# Patient Record
Sex: FEMALE | Race: WHITE | NOT HISPANIC OR LATINO | Employment: PART TIME | URBAN - NONMETROPOLITAN AREA
[De-identification: names, ages, dates, MRNs, and addresses within clinical notes are randomized per-mention and may not be internally consistent; named-entity substitution may affect disease eponyms.]

---

## 2017-02-27 ENCOUNTER — OFFICE VISIT - GICH (OUTPATIENT)
Dept: FAMILY MEDICINE | Facility: OTHER | Age: 52
End: 2017-02-27

## 2017-02-27 ENCOUNTER — HISTORY (OUTPATIENT)
Dept: FAMILY MEDICINE | Facility: OTHER | Age: 52
End: 2017-02-27

## 2017-02-27 DIAGNOSIS — R39.9 UNSPECIFIED SYMPTOMS AND SIGNS INVOLVING THE GENITOURINARY SYSTEM: ICD-10-CM

## 2017-02-27 DIAGNOSIS — R10.2 PELVIC AND PERINEAL PAIN: ICD-10-CM

## 2017-02-27 DIAGNOSIS — R39.15 URGENCY OF URINATION: ICD-10-CM

## 2017-02-27 LAB
BACTERIA URINE: NORMAL BACTERIA/HPF
BILIRUB UR QL: NEGATIVE
CLARITY, URINE: CLEAR CLARITY
COLOR UR: YELLOW COLOR
EPITHELIAL CELLS: NORMAL EPI/HPF
GLUCOSE URINE: NEGATIVE MG/DL
KETONES UR QL: NEGATIVE MG/DL
LEUKOCYTE ESTERASE URINE: ABNORMAL
NITRITE UR QL STRIP: NEGATIVE
OCCULT BLOOD,URINE - HISTORICAL: ABNORMAL
PH UR: 5.5 [PH]
PROTEIN QUALITATIVE,URINE - HISTORICAL: NEGATIVE MG/DL
RBC - HISTORICAL: NORMAL /HPF
SP GR UR STRIP: <=1.005
UROBILINOGEN,QUALITATIVE - HISTORICAL: NORMAL EU/DL
WBC - HISTORICAL: NORMAL /HPF

## 2017-03-01 LAB
CULTURE - HISTORICAL: ABNORMAL
CULTURE - HISTORICAL: ABNORMAL
SUSCEPTIBILITY RESULT - HISTORICAL: ABNORMAL

## 2017-05-24 ENCOUNTER — OFFICE VISIT - GICH (OUTPATIENT)
Dept: FAMILY MEDICINE | Facility: OTHER | Age: 52
End: 2017-05-24

## 2017-05-24 ENCOUNTER — HOSPITAL ENCOUNTER (OUTPATIENT)
Dept: RADIOLOGY | Facility: OTHER | Age: 52
End: 2017-05-24
Attending: NURSE PRACTITIONER

## 2017-05-24 ENCOUNTER — HISTORY (OUTPATIENT)
Dept: FAMILY MEDICINE | Facility: OTHER | Age: 52
End: 2017-05-24

## 2017-05-24 DIAGNOSIS — J20.9 ACUTE BRONCHITIS: ICD-10-CM

## 2017-06-19 ENCOUNTER — HISTORY (OUTPATIENT)
Dept: FAMILY MEDICINE | Facility: OTHER | Age: 52
End: 2017-06-19

## 2017-06-19 ENCOUNTER — OFFICE VISIT - GICH (OUTPATIENT)
Dept: FAMILY MEDICINE | Facility: OTHER | Age: 52
End: 2017-06-19

## 2017-06-19 DIAGNOSIS — I10 ESSENTIAL (PRIMARY) HYPERTENSION: ICD-10-CM

## 2017-06-19 DIAGNOSIS — E78.00 PURE HYPERCHOLESTEROLEMIA: ICD-10-CM

## 2017-06-19 DIAGNOSIS — Z92.89 PERSONAL HISTORY OF OTHER MEDICAL TREATMENT (CODE): ICD-10-CM

## 2017-06-19 DIAGNOSIS — R53.83 OTHER FATIGUE: ICD-10-CM

## 2017-06-19 LAB
A/G RATIO - HISTORICAL: 1.3 (ref 1–2)
ABSOLUTE BASOPHILS - HISTORICAL: 0.1 THOU/CU MM
ABSOLUTE EOSINOPHILS - HISTORICAL: 0.1 THOU/CU MM
ABSOLUTE IMMATURE GRANULOCYTES(METAS,MYELOS,PROS) - HISTORICAL: 0 THOU/CU MM
ABSOLUTE LYMPHOCYTES - HISTORICAL: 1.7 THOU/CU MM (ref 0.9–2.9)
ABSOLUTE MONOCYTES - HISTORICAL: 0.4 THOU/CU MM
ABSOLUTE NEUTROPHILS - HISTORICAL: 5 THOU/CU MM (ref 1.7–7)
ALBUMIN SERPL-MCNC: 3.9 G/DL (ref 3.5–5.7)
ALP SERPL-CCNC: 85 IU/L (ref 34–104)
ALT (SGPT) - HISTORICAL: 10 IU/L (ref 7–52)
ANION GAP - HISTORICAL: 8 (ref 5–18)
AST SERPL-CCNC: 10 IU/L (ref 13–39)
BASOPHILS # BLD AUTO: 1.1 %
BILIRUB SERPL-MCNC: 0.4 MG/DL (ref 0.3–1)
BUN SERPL-MCNC: 11 MG/DL (ref 7–25)
BUN/CREAT RATIO - HISTORICAL: 16
CALCIUM SERPL-MCNC: 9.3 MG/DL (ref 8.6–10.3)
CHLORIDE SERPLBLD-SCNC: 103 MMOL/L (ref 98–107)
CHOL/HDL RATIO - HISTORICAL: 11.39
CHOLESTEROL TOTAL: 353 MG/DL
CO2 SERPL-SCNC: 26 MMOL/L (ref 21–31)
CREAT SERPL-MCNC: 0.69 MG/DL (ref 0.7–1.3)
EOSINOPHIL NFR BLD AUTO: 1.9 %
ERYTHROCYTE [DISTWIDTH] IN BLOOD BY AUTOMATED COUNT: 12.8 % (ref 11.5–15.5)
GFR IF NOT AFRICAN AMERICAN - HISTORICAL: >60 ML/MIN/1.73M2
GLOBULIN - HISTORICAL: 3.1 G/DL (ref 2–3.7)
GLUCOSE SERPL-MCNC: 86 MG/DL (ref 70–105)
HCT VFR BLD AUTO: 43.5 % (ref 33–51)
HDLC SERPL-MCNC: 31 MG/DL (ref 23–92)
HEMOGLOBIN: 14.7 G/DL (ref 12–16)
HEPATITIS C ANTIBODY CATEGORY - HISTORICAL: NORMAL
IMMATURE GRANULOCYTES(METAS,MYELOS,PROS) - HISTORICAL: 0.3 %
LDL COMMENT - HISTORICAL: ABNORMAL
LYMPHOCYTES NFR BLD AUTO: 23.2 % (ref 20–44)
MCH RBC QN AUTO: 30 PG (ref 26–34)
MCHC RBC AUTO-ENTMCNC: 33.8 G/DL (ref 32–36)
MCV RBC AUTO: 89 FL (ref 80–100)
MONOCYTES NFR BLD AUTO: 5.8 %
NEUTROPHILS NFR BLD AUTO: 67.7 % (ref 42–72)
NON-HDL CHOLESTEROL - HISTORICAL: 322 MG/DL
PATIENT STATUS - HISTORICAL: ABNORMAL
PLATELET # BLD AUTO: 239 THOU/CU MM (ref 140–440)
PMV BLD: 11.5 FL (ref 6.5–11)
POTASSIUM SERPL-SCNC: 3.4 MMOL/L (ref 3.5–5.1)
PROT SERPL-MCNC: 7 G/DL (ref 6.4–8.9)
RED BLOOD COUNT - HISTORICAL: 4.9 MIL/CU MM (ref 4–5.2)
SODIUM SERPL-SCNC: 137 MMOL/L (ref 133–143)
TRIGL SERPL-MCNC: 1206 MG/DL
TSH - HISTORICAL: 1.63 UIU/ML (ref 0.34–5.6)
WHITE BLOOD COUNT - HISTORICAL: 7.4 THOU/CU MM (ref 4.5–11)

## 2017-06-19 ASSESSMENT — ANXIETY QUESTIONNAIRES
7. FEELING AFRAID AS IF SOMETHING AWFUL MIGHT HAPPEN: MORE THAN HALF THE DAYS
6. BECOMING EASILY ANNOYED OR IRRITABLE: MORE THAN HALF THE DAYS
5. BEING SO RESTLESS THAT IT IS HARD TO SIT STILL: NOT AT ALL
2. NOT BEING ABLE TO STOP OR CONTROL WORRYING: NOT AT ALL
4. TROUBLE RELAXING: NOT AT ALL
3. WORRYING TOO MUCH ABOUT DIFFERENT THINGS: NEARLY EVERY DAY
1. FEELING NERVOUS, ANXIOUS, OR ON EDGE: NOT AT ALL
GAD7 TOTAL SCORE: 7

## 2017-06-19 ASSESSMENT — PATIENT HEALTH QUESTIONNAIRE - PHQ9: SUM OF ALL RESPONSES TO PHQ QUESTIONS 1-9: 8

## 2017-09-24 ENCOUNTER — COMMUNICATION - GICH (OUTPATIENT)
Dept: FAMILY MEDICINE | Facility: OTHER | Age: 52
End: 2017-09-24

## 2017-09-24 DIAGNOSIS — I10 ESSENTIAL (PRIMARY) HYPERTENSION: ICD-10-CM

## 2017-09-24 DIAGNOSIS — E78.00 PURE HYPERCHOLESTEROLEMIA: ICD-10-CM

## 2017-09-24 DIAGNOSIS — F34.1 DYSTHYMIC DISORDER: ICD-10-CM

## 2017-12-17 ENCOUNTER — HEALTH MAINTENANCE LETTER (OUTPATIENT)
Age: 52
End: 2017-12-17

## 2017-12-27 NOTE — PROGRESS NOTES
Patient Information     Patient Name MRN Joana Alvarez [id number] Female 1965      Progress Notes by Jaycee Ochoa MD at 2017  8:45 AM     Author:  Jaycee Ochoa MD Service:  (none) Author Type:  Physician     Filed:  2017 11:41 AM Encounter Date:  2017 Status:  Signed     :  Jaycee Ochoa MD (Physician)            SUBJECTIVE:    Joana Caicedo is a 52 y.o. female who presents to have lab work completed due to several chronic medical issues including hypertension and hyperlipidemia.      Has been very tired for the past 3 months.  No chest pain or shortness of breath.  If she is doing yard or house work, will get extremely tired and will have to lay down to go to sleep.  Works 8 12 hour shifts in a row, then will have 6 days off.  Doesn't take any naps the days she is working.  Would fall asleep easily if she is sitting watching tv or reading.  Has a very hard time falling asleep at night.  She does snore at night.  Uncertain if any witnessed apnea.  Dad has a history of sleep apnea.  Thinks that a CPAP would make her feel too claustrophobic.  She doesn't think she would want to be tested for obstructive sleep apnea or be treated for it either.    Hasn't taken her blood pressure medications yet today.  She thinks her blood pressure has been high quite often.  Wonders if any of the medications she is on would be causing her to be tired, specifically the atenolol.    She had a history of transfusion related to post-partum hemorrhage after delivery of her daughter many years ago.  She states that she had seen adds on TV regarding being tested for hepatitis C due to her age group.  She is interested in having this done today.    HPI  I personally reviewed medications/allergies/history listed below:       Allergies      Allergen   Reactions     Bupropion  Other - Describe In Comment Field     Facial and neck swelling      Niacin  Flushing      "Sumatriptan  Other - Describe In Comment Field     \"burning veins\"    ,   Family History       Problem   Relation Age of Onset     Heart Disease  Father      Other  Father       stage III COPD       Heart Disease  Mother      Other  Mother       kidney disease       Diabetes  Sister      Other  Sister       fibromyalgia, celiac disease.       Other  Other      High cholesterol runs in family     ,   Current Outpatient Prescriptions on File Prior to Visit       Medication  Sig Dispense Refill     acyclovir (ZOVIRAX) 400 mg tablet TAKE ONE TABLET BY MOUTH THREE TIMES A DAY FOR 5 DAYS AT ONSET OF SYMPTOMS 30 tablet 11     ADULT ASPIRIN EC LOW STRENGTH ORAL Take 1 tablet by mouth once daily.       albuterol HFA 90 mcg/actuation inhaler Inhale 2 Puffs by mouth every 4 hours if needed. 1 Inhaler 0     codeine-guaiFENesin (ROBITUSSIN AC)  mg/5 mL liquid Take 5 mL by mouth at bedtime if needed for Cough. Max dose 60 mL per 24 hrs. 150 mL 0     hydrochlorothiazide (HCTZ) 25 mg tablet Take 1 tablet by mouth once daily. 90 tablet 3     lisinopril (PRINIVIL; ZESTRIL) 20 mg tablet Take 1 tablet by mouth once daily. 90 tablet 3     LORazepam (ATIVAN) 0.5 mg tab Take 1 tablet by mouth every 6 hours if needed for Anxiety. 60 tablet 0     meclizine (ANTIVERT) 25 mg tablet Take 1 tablet by mouth 2 times daily if needed for Vertigo.  0     NITROSTAT 0.4 mg sublingual tablet PLACE 1 TABLET UNDER THE TONGUE EVERY 5 MINUTES IF NEEDED FOR CHESTPAIN. 30 tablet 0     pravastatin (PRAVACHOL) 20 mg tablet Take 1 tablet by mouth at bedtime. 90 tablet 3     sertraline (ZOLOFT) 100 mg tablet Take 1 tablet by mouth once daily. 90 tablet 3     No current facility-administered medications on file prior to visit.    ,   Past Medical History:     Diagnosis  Date     High triglycerides      History of MRI of brain and brain stem     Demyelination , Per patient, has had mini strokes    ,   Patient Active Problem List     Diagnosis  Code     " ANXIETY DEPRESSION F34.1     HYPERLIPIDEMIA E78.5     NICOTINE ADDICTION F17.200     INSOMNIA G47.00     HYPERTENSION I10    and   Past Surgical History:      Procedure  Laterality Date     APPENDECTOMY       AK REVISION CERVIX W PREG VAG APPRCH       TUBAL LIGATION       Social History     Social History        Marital status:       Spouse name: N/A     Number of children:  N/A     Years of education:  N/A     Occupational History      Not on file.     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  She has four children ages 22 to about 15 who are all living at home.  She is  from her  and wants to be  but financially cannot afford that.  Her  is living with the family in an apartment.  He has a girlfriend and Joana has a boyfriend as well.              REVIEW OF SYSTEMS:  Review of Systems   Constitutional: Positive for malaise/fatigue. Negative for diaphoresis.   Cardiovascular: Negative for chest pain, orthopnea and leg swelling.   All other systems reviewed and are negative.      OBJECTIVE:  /92  Temp 96.2  F (35.7  C) (Tympanic)  Wt 67.9 kg (149 lb 12.8 oz)  LMP 09/29/2015  Breastfeeding? No  BMI 26.33 kg/m2    EXAM:   Physical Exam   Constitutional: She is well-developed, well-nourished, and in no distress.   HENT:   Head: Normocephalic.   Eyes: Pupils are equal, round, and reactive to light.   Neck: Normal range of motion. Neck supple. No thyromegaly present.   Cardiovascular: Normal rate, regular rhythm and  normal heart sounds.    No murmur heard.  Pulmonary/Chest: Effort normal and breath sounds normal. No respiratory distress. She has no wheezes. She has no rales.   Musculoskeletal: She exhibits no edema.   Lymphadenopathy:     She has no cervical adenopathy.   Skin: Skin is warm and dry.   Psychiatric: Affect normal.   PHQ Score and Severity  Date of PHQ exam: 06/19/17  PHQ-9 TOTAL SCORE: 8  Depression Severity Level: mild     MAUDE Score and Severity  MAUDE-7 SCORE: 7  ANXIETY SEVERITY LEVEL: mild anxiety        ASSESSMENT/PLAN:    ICD-10-CM    1. HYPERTENSION I10 COMP METABOLIC PANEL      amLODIPine (NORVASC) 5 mg tablet      COMP METABOLIC PANEL   2. Pure hypercholesterolemia E78.00 LIPID PANEL      COMP METABOLIC PANEL      LIPID PANEL      COMP METABOLIC PANEL   3. History of transfusion Z92.89 ANTI HCV      ANTI HCV   4. Fatigue, unspecified type R53.83 CBC WITH DIFFERENTIAL      TSH      CBC WITH DIFFERENTIAL      TSH      CBC WITH AUTO DIFFERENTIAL        Plan:    1.  Chronic, stable.  Blood pressure is a little elevated today.  Comprehensive metabolic profile as above.  She is going to try going off of her atenolol and will try instead amlodipine 5 mg daily.  Discussed possible side effects.  Follow up in approximately 1 month to recheck blood pressure after switching medications.  2.  Chronic, stable.  Labs as above.  Continue on pravastatin.  3.  Lab for hepatitis C was drawn today.  4.  Chronic.  Trial of stopping atenolol as above.  Labs completed as above.  Could have underlying obstructive sleep apnea, but would not want to be tested for it or treated for it as noted above.  Jaycee Ochoa MD ....................  6/19/2017   11:39 AM

## 2017-12-28 NOTE — TELEPHONE ENCOUNTER
Patient Information     Patient Name MRN Sex Joana Beard [id number] Female 1965      Telephone Encounter by Yvonne Esparza RN at 2017  8:51 AM     Author:  Yvonne Esparza RN Service:  (none) Author Type:  NURS- Registered Nurse     Filed:  2017  9:07 AM Encounter Date:  2017 Status:  Signed     :  Yvonne Esparza RN (NURS- Registered Nurse)            Statins  Office visit in the past 12 months.  Last visit with JOSSY STONE was on: 2017 in [facility]  Next visit with JOSSY STONE is on: No future appointment listed with this provider  Next visit with Family Practice is on: No future appointment listed in this department  Last Lipids:  Chol: 353    2017  T    2017  HDL:   31    2017  LDL:  No results found in past 5 years    .  LDL DIRECT:  No results found in past 5 years    .  Concommitant use of fibrates and statins-If it is an addition to the medication list, review note and/or discuss with provider.  If already on medication list, refill.  Max refills 12 months from last office visit.    Due for exam.  Limited refill per protocol and letter mailed.  Yvonne Esparza RN ........   2017    8:57 AM      Ace Inhibitors  Office visit in the past 12 months or per provider note.  Last visit with JOSSY STONE was on: 2017 in [facility]  Next visit with JOSSY STONE is on: No future appointment listed with this provider  Next visit with Family Practice is on: No future appointment listed in this department  Lab test requirements:  Creatinine and Potassium annually, if ordering lab, order BMP.  CREATININE (mg/dL)    Date Value   2017 0.69 (L)     POTASSIUM (mmol/L)    Date Value   2017 3.4 (L)   Max refill for 12 months from last office visit or per provider note  Due for exam.  Limited refill per protocol and letter mailed.  Yvonne Esparza RN ........   2017    8:57  AM        Depression-in adults 18 and over  SSRI  Office visit in the past 12 months or as indicated in chart.  Should have clinic visit 1-2 months after initial prescription.  Last visit with JOSSY STONE was on: 06/19/2017 in [facility]  Next visit with LEORAONY JOSSY EPIFANIO is on: No future appointment listed with this provider  Next visit with [facility] is on: No future appointment listed in this department  Max refills 12 months from last office visit or per providers notes.  Due for exam.  Limited refill per protocol and letter mailed.  Yvonne Esparza RN ........   9/26/2017    8:57 AM        Diuretics (may be prescribed for edema)  Office visit in the past 12 months or per provider note.  Last visit with JOSSY STONE was on: 06/19/2017 in [facility]  Next visit with JOSSY STONE R is on: No future appointment listed with this provider  Next visit with [facility] is on: No future appointment listed in this department  Lab testing requirements:  Creatinine and Potassium annually, if ordering lab, order BMP.  CREATININE (mg/dL)    Date Value   06/19/2017 0.69 (L)     POTASSIUM (mmol/L)    Date Value   06/19/2017 3.4 (L)     BP Readings from Last 4 Encounters:    06/19/17 142/92   05/24/17 140/80   02/27/17 120/80   12/07/16 140/90   Review last provider visit note.  If BP reviewed and plan is noted, can refill.  Max refill for 12 months from last office visit or per provider note.  Due for follow up exam B/P and labs-refer to 6/20/17 letter and 6/19/17 OV. .  Limited refill per protocol and letter mailed.  Yvonne Esparza RN ........   9/26/2017    8:57 AM      Refill request for Atenolol inappropriate. This medication stopped at 06/19/17 and Norvasc started. Pharmacy alerted.

## 2017-12-29 NOTE — PATIENT INSTRUCTIONS
Patient Information     Patient Name MRN Sex Joana Beard [id number] Female 1965      Patient Instructions by Jaycee Ochoa MD at 2017  8:45 AM     Author:  Jaycee Ochoa MD Service:  (none) Author Type:  Physician     Filed:  2017  9:15 AM Encounter Date:  2017 Status:  Signed     :  Jaycee Ochoa MD (Physician)            Try stopping atenolol to see if this helps decrease your fatigue.  We will substitute amlodipine 5 mg daily instead for your blood pressure.  If you notice swelling in your feet or ankles, let me know and we can try a different medication.

## 2018-01-02 ENCOUNTER — COMMUNICATION - GICH (OUTPATIENT)
Dept: FAMILY MEDICINE | Facility: OTHER | Age: 53
End: 2018-01-02

## 2018-01-03 NOTE — PROGRESS NOTES
Patient Information     Patient Name MRN Sex     Joana Caicedo [id number] Female 1965      Progress Notes by Rica Cano NP at 2017 12:15 PM     Author:  Rica Cano NP Service:  (none) Author Type:  PHYS- Nurse Practitioner     Filed:  2017  4:22 PM Encounter Date:  2017 Status:  Signed     :  Rica Cano NP (PHYS- Nurse Practitioner)              HPI:   Joana Caicedo is a 51 y.o. female who presents for bladder concerns.   Symptoms x 5 days and continue to worsen.  Symptoms include suprapubic pressure and fullness, urethral burning, frequency, and urgency.   Chills.  Hot flashes.  No fevers.   Nausea.  No vomiting.  Appetite decreased.  Very thirsty.  No change in bowel patterns (chronic alternating diarrhea and constipation).  No back pain.  No vaginal bleeding, discharge or itching.  Tried some cranberry juice.  No OTC medications.  States she did take some left over Bactrim once daily x 5 days without any relief.        Past Medical History      Diagnosis   Date     High triglycerides       History of MRI of brain and brain stem       Demyelination , Per patient, has had mini strokes        Past Surgical History      Procedure  Laterality Date     Pr revision cervix w preg vag apprch       Appendectomy       Tubal ligation         Social History        Substance Use Topics          Smoking status:   Current Every Day Smoker      Packs/day:  1.00      Years:  31.00      Types:  Cigarettes      Smokeless tobacco:   Never Used      Alcohol use   Yes      Comment: 2 per month         Current Outpatient Prescriptions       Medication  Sig Dispense Refill     acyclovir (ZOVIRAX) 400 mg tablet TAKE ONE TABLET BY MOUTH THREE TIMES A DAY FOR 5 DAYS AT ONSET OF SYMPTOMS 30 tablet 11     ADULT ASPIRIN EC LOW STRENGTH ORAL Take 1 tablet by mouth once daily.       atenolol (TENORMIN) 50 mg tablet Take 1 tablet by mouth once daily. 90 tablet 3      "hydrochlorothiazide (HCTZ) 25 mg tablet Take 1 tablet by mouth once daily. 90 tablet 3     lisinopril (PRINIVIL; ZESTRIL) 20 mg tablet Take 1 tablet by mouth once daily. 90 tablet 3     LORazepam (ATIVAN) 0.5 mg tab Take 1 tablet by mouth every 6 hours if needed for Anxiety. 60 tablet 0     meclizine (ANTIVERT) 25 mg tablet Take 1 tablet by mouth 2 times daily if needed for Vertigo.  0     NITROSTAT 0.4 mg sublingual tablet PLACE 1 TABLET UNDER THE TONGUE EVERY 5 MINUTES IF NEEDED FOR CHESTPAIN. 30 tablet 0     pravastatin (PRAVACHOL) 20 mg tablet Take 1 tablet by mouth at bedtime. 90 tablet 3     sertraline (ZOLOFT) 100 mg tablet Take 1 tablet by mouth once daily. 90 tablet 3     No current facility-administered medications for this visit.      Medications have been reviewed by me and are current to the best of my knowledge and ability.      Allergies      Allergen   Reactions     Bupropion  Other - Describe In Comment Field     Facial and neck swelling      Niacin  Flushing     Sumatriptan  Other - Describe In Comment Field     \"burning veins\"        REVIEW OF SYSTEMS:  Refer to HPI.      EXAM:   Vitals:    /80  Pulse 88  Temp 97.9  F (36.6  C) (Tympanic)  Ht 1.607 m (5' 3.25\")  Wt 66.7 kg (147 lb)  LMP 09/29/2015  BMI 25.83 kg/m2    General Appearance: Pleasant, alert, appropriate appearance for age. No acute distress  Chest/Respiratory Exam: Normal chest wall and respirations. Clear to auscultation.  Cardiovascular Exam: Regular rate and rhythm. S1, S2, no murmur  Gastrointestinal Exam: Soft, no masses or organomegaly. Abdomen non-tender. Normal BS x 4.  Diffuse suprapubic tenderness. No CVA tenderness to palpation. No rebound tenderness or guarding.  Psychiatric Exam: Alert and oriented - appropriate affect.    Labs:   Results for orders placed or performed in visit on 02/27/17      URINALYSIS W REFLEX MICROSCOPIC IF POSITIVE      Result  Value Ref Range    COLOR                     Yellow Yellow Color    " CLARITY                   Clear Clear Clarity    SPECIFIC GRAVITY,URINE    <=1.005 (A) 1.010, 1.015, 1.020, 1.025                    PH,URINE                  5.5 6.0, 7.0, 8.0, 5.5, 6.5, 7.5, 8.5                    UROBILINOGEN,QUALITATIVE  Normal Normal EU/dl    PROTEIN, URINE Negative Negative mg/dL    GLUCOSE, URINE Negative Negative mg/dL    KETONES,URINE             Negative Negative mg/dL    BILIRUBIN,URINE           Negative Negative                    OCCULT BLOOD,URINE        Moderate (A) Negative                    NITRITE                   Negative Negative                    LEUKOCYTE ESTERASE        Small (A) Negative                   URINALYSIS MICROSCOPIC      Result  Value Ref Range    RBC 0-2 0-2, None Seen /HPF    WBC 3-5 0-2, 3-5, None Seen /HPF    BACTERIA                  Few None Seen, Rare, Occasional, Few Bacteria/HPF    EPITHELIAL CELLS          Few None Seen, Few Epi/HPF       ASSESSMENT AND PLAN:      ICD-10-CM    1. Urgency of urination R39.15 URINALYSIS W REFLEX MICROSCOPIC IF POSITIVE      URINALYSIS W REFLEX MICROSCOPIC IF POSITIVE      URINALYSIS MICROSCOPIC      URINALYSIS MICROSCOPIC   2. UTI symptoms R39.9 URINE CULTURE      nitrofurantoin macrocrystals/monohydrate (MACROBID) 100 mg capsule      URINE CULTURE   3. Suprapubic pain R10.2 URINE CULTURE      phenazopyridine (PYRIDIUM) 200 mg tablet      URINE CULTURE       Patient refuses wet prep testing  Urinalysis - not indicative of infection.  Symptoms are severe per patient.  Patient self treated with 5 doses of Bactrim at home without improvement.  Urine culture pending  Macrobid 100 mg BID x 5 days for UTI symptoms while awaiting urine culture   Encouraged fluids and frequent bladder emptying.  May use Pyridium OTC PRN.   Call or return to clinic PRN if these symptoms worsen or fail to improve as anticipated. Will call if culture warrants change of abx.               Patient Instructions   Antibiotic has been sent to  pharmacy. Please take full course of antibiotic even if symptoms have completely resolved. This helps prevent against antibiotic resistance.     We will culture the urine to see what bacteria grows out of the urine.  We will call the patient if a change of antibiotic is necessary per the culture.      Patient was instructed in increase fluids including water and cranberry juice.      Monitor for fevers, chills, back pain.  Return to clinic after antibiotic completion if symptoms are not resolved.  Call clinic if symptoms change/worsen.          ARNAV MARQUIS NP..................2/27/2017 12:22 PM

## 2018-01-03 NOTE — PATIENT INSTRUCTIONS
Patient Information     Patient Name MRN Sex Joana Beard [id number] Female 1965      Patient Instructions by Rica Cano NP at 2017 12:15 PM     Author:  Rica Cano NP Service:  (none) Author Type:  PHYS- Nurse Practitioner     Filed:  2017 12:40 PM Encounter Date:  2017 Status:  Signed     :  Rica Cano NP (PHYS- Nurse Practitioner)            Antibiotic has been sent to pharmacy. Please take full course of antibiotic even if symptoms have completely resolved. This helps prevent against antibiotic resistance.     We will culture the urine to see what bacteria grows out of the urine.  We will call the patient if a change of antibiotic is necessary per the culture.      Patient was instructed in increase fluids including water and cranberry juice.      Monitor for fevers, chills, back pain.  Return to clinic after antibiotic completion if symptoms are not resolved.  Call clinic if symptoms change/worsen.

## 2018-01-05 NOTE — PATIENT INSTRUCTIONS
Patient Information     Patient Name MRN Sex Joana Beard [id number] Female 1965      Patient Instructions by Rica Cano NP at 2017  1:45 PM     Author:  Rica Cano NP Service:  (none) Author Type:  PHYS- Nurse Practitioner     Filed:  2017  2:36 PM Encounter Date:  2017 Status:  Signed     :  Rica Cano NP (PHYS- Nurse Practitioner)            Azithromycin daily x 5 days    Prednisone 2 tabs daily x 5 days       Albuterol inhaler every 4 hours as needed    Robitussin with codeine at bedtime as needed          Most coughs are caused by a viral infection.   Usually coughs can last 2 to 3 weeks. Sometimes the cough becomes loose (wet) for a few days, and your child coughs up a lot of phlegm (mucus). This is usually a sign that the end of the illness is near.    Most sore throats are caused by viruses and are part of a cold. About 10% of sore throats are caused by strep bacteria.    Encouraged fluids and rest.    May use symptomatic care with tylenol or ibuprofen.     Using a humidifier works well to break up the congestion.     Elevate the mattress to 15 degrees in order to help with the congestion.    Frequent swallows of cool liquid.      Oatmeal or honey coats the throat and some patients find it soothes the pain.     Salt water gargles as needed    Return to clinic with change/worsening of symptoms or concerns.

## 2018-01-05 NOTE — NURSING NOTE
Patient Information     Patient Name MRN Joana Alvarez [id number] Female 1965      Nursing Note by Holly Bonner at 2017  1:45 PM     Author:  Holly Bonner Service:  (none) Author Type:  NURS- Student Practical Nurse     Filed:  2017  2:01 PM Encounter Date:  2017 Status:  Signed     :  Holly Bonner (NURS- Student Practical Nurse)            Patient presents with cough productive yellow/green phlegm, burning in chest when coughing since mothers day. Holly Bonner LPN .............2017  1:50 PM

## 2018-01-09 ENCOUNTER — COMMUNICATION - GICH (OUTPATIENT)
Dept: FAMILY MEDICINE | Facility: OTHER | Age: 53
End: 2018-01-09

## 2018-01-09 DIAGNOSIS — F34.1 DYSTHYMIC DISORDER: ICD-10-CM

## 2018-01-09 DIAGNOSIS — I10 ESSENTIAL (PRIMARY) HYPERTENSION: ICD-10-CM

## 2018-01-09 DIAGNOSIS — E78.00 PURE HYPERCHOLESTEROLEMIA: ICD-10-CM

## 2018-01-27 VITALS
DIASTOLIC BLOOD PRESSURE: 80 MMHG | WEIGHT: 151 LBS | SYSTOLIC BLOOD PRESSURE: 140 MMHG | BODY MASS INDEX: 26.54 KG/M2 | TEMPERATURE: 97.4 F | HEART RATE: 69 BPM

## 2018-01-27 VITALS
HEIGHT: 63 IN | SYSTOLIC BLOOD PRESSURE: 120 MMHG | TEMPERATURE: 97.9 F | WEIGHT: 147 LBS | HEART RATE: 88 BPM | DIASTOLIC BLOOD PRESSURE: 80 MMHG | BODY MASS INDEX: 26.05 KG/M2

## 2018-01-27 VITALS
WEIGHT: 149.8 LBS | TEMPERATURE: 96.2 F | SYSTOLIC BLOOD PRESSURE: 142 MMHG | DIASTOLIC BLOOD PRESSURE: 92 MMHG | BODY MASS INDEX: 26.12 KG/M2

## 2018-01-31 ASSESSMENT — ANXIETY QUESTIONNAIRES: GAD7 TOTAL SCORE: 7

## 2018-01-31 ASSESSMENT — PATIENT HEALTH QUESTIONNAIRE - PHQ9: SUM OF ALL RESPONSES TO PHQ QUESTIONS 1-9: 8

## 2018-02-09 ENCOUNTER — DOCUMENTATION ONLY (OUTPATIENT)
Dept: FAMILY MEDICINE | Facility: OTHER | Age: 53
End: 2018-02-09

## 2018-02-09 PROBLEM — F17.200 NICOTINE ADDICTION: Status: ACTIVE | Noted: 2018-02-09

## 2018-02-09 PROBLEM — I10 HYPERTENSION: Status: ACTIVE | Noted: 2018-02-09

## 2018-02-09 PROBLEM — G47.00 INSOMNIA: Status: ACTIVE | Noted: 2018-02-09

## 2018-02-09 PROBLEM — E78.5 HYPERLIPIDEMIA: Status: ACTIVE | Noted: 2018-02-09

## 2018-02-09 RX ORDER — LORAZEPAM 0.5 MG/1
0.5 TABLET ORAL EVERY 6 HOURS PRN
COMMUNITY
Start: 2016-09-16 | End: 2018-11-02

## 2018-02-09 RX ORDER — CODEINE PHOSPHATE/GUAIFENESIN 10-100MG/5
5 LIQUID (ML) ORAL
COMMUNITY
Start: 2017-05-24 | End: 2018-08-29

## 2018-02-09 RX ORDER — HYDROCHLOROTHIAZIDE 25 MG/1
25 TABLET ORAL DAILY
COMMUNITY
Start: 2018-01-11 | End: 2018-05-07

## 2018-02-09 RX ORDER — ASPIRIN 81 MG/1
1 TABLET ORAL DAILY
COMMUNITY
End: 2022-12-22

## 2018-02-09 RX ORDER — NITROGLYCERIN 0.4 MG/1
1 TABLET SUBLINGUAL EVERY 5 MIN PRN
COMMUNITY
Start: 2015-02-16 | End: 2019-02-11

## 2018-02-09 RX ORDER — ACYCLOVIR 400 MG/1
1 TABLET ORAL 3 TIMES DAILY
COMMUNITY
Start: 2016-06-28 | End: 2018-06-28

## 2018-02-09 RX ORDER — SERTRALINE HYDROCHLORIDE 100 MG/1
100 TABLET, FILM COATED ORAL DAILY
COMMUNITY
Start: 2018-01-11 | End: 2018-05-07

## 2018-02-09 RX ORDER — MECLIZINE HYDROCHLORIDE 25 MG/1
25 TABLET ORAL 2 TIMES DAILY PRN
COMMUNITY
Start: 2012-10-25 | End: 2021-06-03

## 2018-02-09 RX ORDER — AMLODIPINE BESYLATE 5 MG/1
5 TABLET ORAL DAILY
COMMUNITY
Start: 2017-06-19 | End: 2018-06-15

## 2018-02-09 RX ORDER — ALBUTEROL SULFATE 90 UG/1
2 AEROSOL, METERED RESPIRATORY (INHALATION) EVERY 4 HOURS PRN
COMMUNITY
Start: 2017-05-24 | End: 2018-08-29

## 2018-02-09 RX ORDER — PRAVASTATIN SODIUM 20 MG
20 TABLET ORAL AT BEDTIME
COMMUNITY
Start: 2018-01-11 | End: 2018-05-07

## 2018-02-09 RX ORDER — LISINOPRIL 20 MG/1
20 TABLET ORAL DAILY
COMMUNITY
Start: 2018-01-11 | End: 2018-05-07

## 2018-02-12 NOTE — TELEPHONE ENCOUNTER
Patient Information     Patient Name MRN Joana Alvarez [id number] Female 1965      Telephone Encounter by Yvonne Esparza RN at 2018  2:31 PM     Author:  Yvonne Esparza RN Service:  (none) Author Type:  NURS- Registered Nurse     Filed:  2018  2:34 PM Encounter Date:  2018 Status:  Signed     :  Yvonne Esparza RN (NURS- Registered Nurse)            Refill request for Atenolol 50 mg inappropriate. This medication was discontinued on 17. Contacted pharmacy and informed, 'Oh yes we have that it was discontinued. I am not sure why we faxed for a refill'. Unable to complete prescription refill per RN Medication Refill Policy.................... Yvonne Esparza RN ....................  2018   2:33 PM

## 2018-02-12 NOTE — TELEPHONE ENCOUNTER
Patient Information     Patient Name MRN Sex Joana Beard [id number] Female 1965      Telephone Encounter by Jaycee Ochoa MD at 2018 10:34 AM     Author:  Jaycee Ochoa MD Service:  (none) Author Type:  Physician     Filed:  2018 10:34 AM Encounter Date:  2018 Status:  Signed     :  Jaycee Ochoa MD (Physician)            I refilled #90 of each medication.  Please have patient make follow up appointment.  Jaycee Ochoa MD ....................  2018   10:34 AM

## 2018-02-12 NOTE — TELEPHONE ENCOUNTER
Patient Information     Patient Name MRN Sex Joana Beard [id number] Female 1965      Telephone Encounter by Yvonne Esparza RN at 1/10/2018 10:43 AM     Author:  Yvonne Esparza RN Service:  (none) Author Type:  NURS- Registered Nurse     Filed:  1/10/2018 10:55 AM Encounter Date:  2018 Status:  Signed     :  Yvonne Esparza RN (NURS- Registered Nurse)            Request physician consideration to refill lisinopril, pravastatin, hctz and sertraline as requested. Patient remains due for follow up  B/P and labs after notification on 17-refer to 17 letter and 17 OV. Patient unavailable via phone today. Unable to leave message. No identifiers.     Ace Inhibitors  Office visit in the past 12 months or per provider note.  Last visit with JOSSY STONE was on: 2017 in [place] [facility]-Follow up in approximately 1 month to recheck blood pressure after switching medications  Next visit with JOSSY STONE is on: No future appointment listed with this provider  Lab test requirements:  Creatinine and Potassium annually, if ordering lab, order BMP.  CREATININE (mg/dL)    Date Value   2017 0.69 (L)     POTASSIUM (mmol/L)    Date Value   2017 3.4 (L)   Max refill for 12 months from last office visit or per provider note  Unable to complete prescription refill per RN Medication Refill Policy.................... Yvonne Esparza RN ....................  1/10/2018   10:48 AM        Diuretics (may be prescribed for edema)  Office visit in the past 12 months or per provider note.  Last visit with JOSSY STONE was on: 2017 in [place] [facility]-Follow up in approximately 1 month to recheck blood pressure after switching medications  Next visit with JOSSY STONE is on: No future appointment listed with this provider  Next visit with Family Practice is on: No future appointment listed in this department  Lab testing requirements:   Creatinine and Potassium annually, if ordering lab, order BMP.  CREATININE (mg/dL)    Date Value   2017 0.69 (L)     POTASSIUM (mmol/L)    Date Value   2017 3.4 (L)     BP Readings from Last 4 Encounters:    17 142/92   17 140/80   17 120/80   16 140/90   Unable to complete prescription refill per RN Medication Refill Policy.................... Yvonne Esparza RN ....................  1/10/2018   10:50 AM      Statins  Office visit in the past 12 months.  Last visit with JOSSY STONE was on: 2017 in [facility]  Next visit with JOSSY STONE is on: No future appointment listed with this provider  Next visit with [facility] is on: No future appointment listed in this department  Last Lipids:  Chol: 353    2017  T    2017  HDL:   31    2017  LDL:  No results found in past 5 years    .  LDL DIRECT:  No results found in past 5 years    .  Concommitant use of fibrates and statins-If it is an addition to the medication list, review note and/or discuss with provider.  If already on medication list, refill.  Max refills 12 months from last office visit.    Unable to complete prescription refill per RN Medication Refill Policy.................... Yvonne Esparza RN ....................  1/10/2018   10:51 AM        Depression-in adults 18 and over  SSRI  Office visit in the past 12 months or as indicated in chart.  Should have clinic visit 1-2 months after initial prescription.  Last visit with JOSSY STONE was on: 2017 in [facility]  Next visit with JOSSY STONE is on: No future appointment listed with this provider  Next visit with [facility] is on: No future appointment listed in this department  Max refills 12 months from last office visit or per providers notes.  Unable to complete prescription refill per RN Medication Refill Policy.................... Yvonne Esparza RN ....................   1/10/2018   10:51 AM

## 2018-02-13 NOTE — TELEPHONE ENCOUNTER
Patient Information     Patient Name MRN Sex Joana Beard [id number] Female 1965      Telephone Encounter by Kalie Hodges at 1/15/2018  1:43 PM     Author:  Kalie Hodges Service:  (none) Author Type:  (none)     Filed:  1/15/2018  1:43 PM Encounter Date:  2018 Status:  Signed     :  Kalie Hodges            No answer , unable to reach patient .  Kalie Hodges LPN ....................1/15/2018  1:43 PM

## 2018-02-13 NOTE — TELEPHONE ENCOUNTER
Patient Information     Patient Name MRN Sex Joana Beard [id number] Female 1965      Telephone Encounter by Kalie Hodges at 2018 10:54 AM     Author:  Kalie Hodges Service:  (none) Author Type:  (none)     Filed:  2018 10:54 AM Encounter Date:  2018 Status:  Signed     :  Kalie Hodges            Left message for patient to call back .  Kalie Hodges LPN ....................2018  10:54 AM

## 2018-05-07 DIAGNOSIS — I10 ESSENTIAL HYPERTENSION WITH GOAL BLOOD PRESSURE LESS THAN 140/90: Primary | ICD-10-CM

## 2018-05-07 DIAGNOSIS — E78.00 PURE HYPERCHOLESTEROLEMIA: ICD-10-CM

## 2018-05-07 DIAGNOSIS — F34.1 DYSTHYMIC DISORDER: ICD-10-CM

## 2018-05-07 NOTE — LETTER
May 9, 2018      Joana Caicedo  [address] [place] [id number]          This is to remind you that you are coming due for your annual labs and office visit with Jaycee Ochoa MD.  Your last visit was on 06/19/2017.     Additional refills of your medication require you to complete this visit.    Please call [phone] to schedule your appointment.    Thank you for choosing [facility] for your health care needs.    Sincerely,      Refill RN  [facility]

## 2018-05-09 NOTE — TELEPHONE ENCOUNTER
HCTZ, Pravastatin, Lisinopril, Sertraline  LOV and labs 06/19/2017. Coming due for annual labs and OV. Reminder letter sent.    Routing refill request to provider for review/approval because: Labs out of range: 06/19/17 K+ and Creatinine      Unable to complete prescription refill per RNMedication Refill Policy.................... Yvonne Esparza ....................  5/9/2018   10:34 AM

## 2018-05-10 RX ORDER — LISINOPRIL 20 MG/1
TABLET ORAL
Qty: 90 TABLET | Refills: 0 | Status: SHIPPED | OUTPATIENT
Start: 2018-05-10 | End: 2018-08-24

## 2018-05-10 RX ORDER — HYDROCHLOROTHIAZIDE 25 MG/1
TABLET ORAL
Qty: 90 TABLET | Refills: 0 | Status: SHIPPED | OUTPATIENT
Start: 2018-05-10 | End: 2018-09-26

## 2018-05-10 RX ORDER — SERTRALINE HYDROCHLORIDE 100 MG/1
TABLET, FILM COATED ORAL
Qty: 90 TABLET | Refills: 0 | Status: SHIPPED | OUTPATIENT
Start: 2018-05-10 | End: 2018-09-19

## 2018-05-10 RX ORDER — PRAVASTATIN SODIUM 20 MG
TABLET ORAL
Qty: 90 TABLET | Refills: 0 | Status: SHIPPED | OUTPATIENT
Start: 2018-05-10 | End: 2018-08-24

## 2018-05-31 ENCOUNTER — OFFICE VISIT (OUTPATIENT)
Dept: FAMILY MEDICINE | Facility: OTHER | Age: 53
End: 2018-05-31
Attending: NURSE PRACTITIONER
Payer: COMMERCIAL

## 2018-05-31 VITALS
RESPIRATION RATE: 20 BRPM | SYSTOLIC BLOOD PRESSURE: 132 MMHG | HEART RATE: 92 BPM | TEMPERATURE: 96.1 F | WEIGHT: 150.5 LBS | BODY MASS INDEX: 26.45 KG/M2 | DIASTOLIC BLOOD PRESSURE: 80 MMHG

## 2018-05-31 DIAGNOSIS — B00.89 HERPES DERMATITIS: Primary | ICD-10-CM

## 2018-05-31 PROCEDURE — 99213 OFFICE O/P EST LOW 20 MIN: CPT | Performed by: NURSE PRACTITIONER

## 2018-05-31 RX ORDER — VALACYCLOVIR HYDROCHLORIDE 1 G/1
2000 TABLET, FILM COATED ORAL 2 TIMES DAILY
Qty: 30 TABLET | Refills: 0 | Status: SHIPPED | OUTPATIENT
Start: 2018-05-31 | End: 2018-05-31

## 2018-05-31 RX ORDER — VALACYCLOVIR HYDROCHLORIDE 1 G/1
2000 TABLET, FILM COATED ORAL 2 TIMES DAILY
Qty: 30 TABLET | Refills: 0 | Status: SHIPPED | OUTPATIENT
Start: 2018-05-31 | End: 2018-11-02

## 2018-05-31 ASSESSMENT — PAIN SCALES - GENERAL: PAINLEVEL: WORST PAIN (10)

## 2018-05-31 NOTE — PROGRESS NOTES
Nursing Notes:   Maryuri Ga LPN  5/31/2018 10:57 AM  Unsigned  Patient presents to the clinic for possible shingles. States it started last night with the pain. Has had shingles before.   Maryuri Ga LPN............. May 31, 2018 10:57 AM       SUBJECTIVE:   Joana Caicedo is a 53 year old female who presents to clinic today for the following health issues:    Rash    She reports a burning sensation in her chin.  She has had this in the past and has been diagnosed with shingles.  However she has had recurrent episodes, she thinks that this is episode number 8.  Her mother also has had shingles numerous times as well so she believes she has inherited this from her mom.  In the past she has been on antiviral medications and this has helped. Denies fevers, chills. C/O itchy rash on chin and neck area.       Problem list and histories reviewed & adjusted, as indicated.  Additional history: as documented    Current Outpatient Prescriptions   Medication Sig Dispense Refill     acyclovir (ZOVIRAX) 400 MG tablet Take 1 tablet by mouth 3 times daily Take for 5 days at onset of symptoms.       albuterol (PROAIR HFA/PROVENTIL HFA/VENTOLIN HFA) 108 (90 BASE) MCG/ACT Inhaler Inhale 2 puffs into the lungs every 4 hours as needed       amLODIPine (NORVASC) 5 MG tablet Take 5 mg by mouth daily       aspirin EC 81 MG EC tablet Take 1 tablet by mouth daily       ASPIRIN PO Take 81 mg by mouth daily       ATENOLOL PO Take 50 mg by mouth daily       guaiFENesin-codeine (GUAIFENESIN AC) 100-10 MG/5ML SYRP syrup Take 5 mLs by mouth nightly as needed for cough Max dose 60 mL per 24 hrs       hydrochlorothiazide (HYDRODIURIL) 25 MG tablet TAKE 1 TABLET BY MOUTH ONCE DAILY. 90 tablet 0     HYDROCHLOROTHIAZIDE PO Take 25 mg by mouth daily       lisinopril (PRINIVIL/ZESTRIL) 20 MG tablet TAKE 1 TABLET BY MOUTH ONCE DAILY. 90 tablet 0     LISINOPRIL PO Take 25 mg by mouth       LORazepam (ATIVAN) 0.5 MG tablet Take  "0.5 mg by mouth every 6 hours as needed for anxiety       meclizine (ANTIVERT) 25 MG tablet Take 25 mg by mouth 2 times daily as needed       nitroGLYcerin (NITROSTAT) 0.4 MG sublingual tablet Place 1 tablet under the tongue every 5 minutes as needed for chest pain       pravastatin (PRAVACHOL) 20 MG tablet TAKE 1 TABLET BY MOUTH AT BEDTIME. 90 tablet 0     sertraline (ZOLOFT) 100 MG tablet TAKE 1 TABLET BY MOUTH ONCE DAILY. 90 tablet 0     SERTRALINE HCL PO Take 50 mg by mouth       Allergies   Allergen Reactions     Amoxicillin      Bupropion      Other reaction(s): Other - Describe In Comment Field  Facial and neck swelling     Niacin      Other reaction(s): Flushing     No Clinical Screening - See Comments Other (See Comments)     Migraine med that starts with a \"V\"  cvholesterol med that starts with a \"N\"     Sumatriptan      Other reaction(s): Other - Describe In Comment Field  \"burning veins\"       Reviewed and updated as needed this visit by clinical staff  Tobacco  Allergies  Meds       Reviewed and updated as needed this visit by Provider         ROS:  A comprehensive 10 point ROS was obtained and documented for notable findings in the HPI.       OBJECTIVE:     /80 (BP Location: Right arm, Patient Position: Sitting, Cuff Size: Adult Regular)  Pulse 92  Temp 96.1  F (35.6  C) (Tympanic)  Resp 20  Wt 150 lb 8 oz (68.3 kg)  Breastfeeding? No  BMI 26.45 kg/m2  Body mass index is 26.45 kg/(m^2).  GENERAL: healthy, alert and no distress  EYES: Eyes grossly normal to inspection  RESP: With ease  SKIN: Examination of the rash reveals: Herpes: One Inflamed patch of clear fluid-filled vesicles located on the LT side of the chin. Pain on the skin all over the chin and neck.       Diagnostic Test Results:  none     ASSESSMENT/PLAN:     1. Herpes dermatitis  - valACYclovir (VALTREX) 1000 mg tablet; Take 2 tablets (2,000 mg) by mouth 2 times daily Then take 1 tablet BID for 3 days. May repeat for future " outbreaks at onset of lesions.  Dispense: 30 tablet; Refill: 0    Medical Decision Making:    Differential Diagnosis:  Rash: Dermatitis  Herpes simplex  Herpes zoster    Serious Comorbid Conditions:  Adult:  None    PLAN:    Rash:  antiviral  moisturizer  Reassurance was given to the patient  Tylenol or Ibuprofen for pain, fever  Rx  Discussed that in the past the rash has crossed the body center line. And since there have been multiple outbreaks, this is not likely shingles. But more of a cold sore virus on the skin. Either way it is treated the same way. Valtrex written.     Followup:    If not improving or if condition worsens, follow up with your Primary Care Provider    Disclaimer:  This note consists of words and symbols derived from keyboarding, dictation, or using voice recognition software. As a result, there may be errors in the script that have gone undetected. Please consider this when interpreting information found in this note.      Michelle Redding NP, 5/31/2018 10:59 AM

## 2018-05-31 NOTE — PATIENT INSTRUCTIONS
The Herpes Virus  Herpes is a virus that can cause sores on the skin. There are 2 types of the virus. Depending on how you come in contact with the virus, either type can cause outbreaks near the mouth or on the sex organs.  Understanding the herpes virus  Herpes reproduces only when it is inside the body. It does so by tricking a healthy cell into producing copies of the herpes virus. Each copy can infect nearby cells. But, before too long, the body s defenses rally to stop the attack. The immune system forces the virus to retreat. Even then, the virus stays inside the body but does not cause disease. For some people, an acute outbreak never happens again. For others, outbreaks are more likely to occur due to menstruation, illness, poor diet, fatigue, exposure to cold or strong sunlight, or stress.    How the herpes virus attacks  1. The herpes virus enters the body through a small break in the skin. The virus can also enter by direct contact with mucous membranes, such as those of the lips, vagina, or anus.  2. Inside the body, the herpes virus binds to a special site on a skin cell. Then part of the virus moves into the cell.  3. Inside the skin cell, the virus releases a set of instructions. These commands cause the cell to begin making copies of the herpes virus.  4. Herpes blisters appear on the skin. Herpes blisters may also appear on mucous membranes lining the mouth, vagina, or anus.

## 2018-05-31 NOTE — MR AVS SNAPSHOT
After Visit Summary   5/31/2018    Joana Caicedo    MRN: [id number]           Patient Information     Date Of Birth          1965        Visit Information        Provider Department      5/31/2018 11:00 AM Michelle Redding NP [facility]        Today's Diagnoses     Herpes dermatitis    -  1      Care Instructions      The Herpes Virus  Herpes is a virus that can cause sores on the skin. There are 2 types of the virus. Depending on how you come in contact with the virus, either type can cause outbreaks near the mouth or on the sex organs.  Understanding the herpes virus  Herpes reproduces only when it is inside the body. It does so by tricking a healthy cell into producing copies of the herpes virus. Each copy can infect nearby cells. But, before too long, the body s defenses rally to stop the attack. The immune system forces the virus to retreat. Even then, the virus stays inside the body but does not cause disease. For some people, an acute outbreak never happens again. For others, outbreaks are more likely to occur due to menstruation, illness, poor diet, fatigue, exposure to cold or strong sunlight, or stress.    How the herpes virus attacks  1. The herpes virus enters the body through a small break in the skin. The virus can also enter by direct contact with mucous membranes, such as those of the lips, vagina, or anus.  2. Inside the body, the herpes virus binds to a special site on a skin cell. Then part of the virus moves into the cell.  3. Inside the skin cell, the virus releases a set of instructions. These commands cause the cell to begin making copies of the herpes virus.  4. Herpes blisters appear on the skin. Herpes blisters may also appear on mucous membranes lining the mouth, vagina, or anus.                    Follow-ups after your visit        Who to contact     If you have questions or need follow up information about today's clinic visit or your  schedule please contact [facility] directly at [phone].  Normal or non-critical lab and imaging results will be communicated to you by MyChart, letter or phone within 4 business days after the clinic has received the results. If you do not hear from us within 7 days, please contact the clinic through MyChart or phone. If you have a critical or abnormal lab result, we will notify you by phone as soon as possible.  Submit refill requests through [employer] or call your pharmacy and they will forward the refill request to us. Please allow 3 business days for your refill to be completed.          Additional Information About Your Visit        Care EveryWhere ID     This is your Care EveryWhere ID. This could be used by other organizations to access your [place] medical records  [id number]        Your Vitals Were     Pulse Temperature Respirations Breastfeeding? BMI (Body Mass Index)       92 96.1  F (35.6  C) (Tympanic) 20 No 26.45 kg/m2        Blood Pressure from Last 3 Encounters:   05/31/18 132/80   06/19/17 (!) 142/92   05/24/17 140/80    Weight from Last 3 Encounters:   05/31/18 150 lb 8 oz (68.3 kg)   06/19/17 149 lb 12.8 oz (67.9 kg)   05/24/17 151 lb (68.5 kg)              Today, you had the following     No orders found for display         Today's Medication Changes          These changes are accurate as of 5/31/18 11:10 AM.  If you have any questions, ask your nurse or doctor.               Start taking these medicines.        Dose/Directions    valACYclovir 1000 mg tablet   Commonly known as:  VALTREX   Used for:  Herpes dermatitis   Started by:  Michelle Redding NP        Dose:  2000 mg   Take 2 tablets (2,000 mg) by mouth 2 times daily Then take 1 tablet BID for 3 days. May repeat for future outbreaks at onset of lesions.   Quantity:  30 tablet   Refills:  0            Where to get your medicines      These medications were sent to [id number] - [place], [place] -  [address]  [address], [place] [address]     Phone:  [phone]     valACYclovir 1000 mg tablet                Primary Care Provider Office Phone # Fax #    Jaycee Mita Ochoa [phone]       [address]  [place] [address]        Equal Access to Services     [facility] : Hadii aad ku hadasho Soomaali, waaxda luqadaha, qaybta kaalmada adeegyada, waxay idiin hayaan adeeg khpattiesh la'tahminan . So [facility] [phone].    ATENCIÓN: Si carlie messer, tiene a meeks disposición servicios gratuitos de asistencia lingüística. Myron al [phone].    We comply with applicable federal civil rights laws and [place] laws. We do not discriminate on the basis of race, color, national origin, age, disability, sex, sexual orientation, or gender identity.            Thank you!     Thank you for choosing [facility]  for your care. Our goal is always to provide you with excellent care. Hearing back from our patients is one way we can continue to improve our services. Please take a few minutes to complete the written survey that you may receive in the mail after your visit with us. Thank you!             Your Updated Medication List - Protect others around you: Learn how to safely use, store and throw away your medicines at www.disposemymeds.org.          This list is accurate as of 5/31/18 11:10 AM.  Always use your most recent med list.                   Brand Name Dispense Instructions for use Diagnosis    acyclovir 400 MG tablet    ZOVIRAX     Take 1 tablet by mouth 3 times daily Take for 5 days at onset of symptoms.        albuterol 108 (90 Base) MCG/ACT Inhaler    PROAIR HFA/PROVENTIL HFA/VENTOLIN HFA     Inhale 2 puffs into the lungs every 4 hours as needed        amLODIPine 5 MG tablet    NORVASC     Take 5 mg by mouth daily        aspirin 81 MG EC tablet      Take 1 tablet by mouth daily        ASPIRIN PO      Take 81 mg by mouth daily         ATENOLOL PO      Take 50 mg by mouth daily        guaiFENesin-codeine 100-10 MG/5ML Syrp syrup    guaiFENesin AC     Take 5 mLs by mouth nightly as needed for cough Max dose 60 mL per 24 hrs        * HYDROCHLOROTHIAZIDE PO      Take 25 mg by mouth daily        * hydrochlorothiazide 25 MG tablet    HYDRODIURIL    90 tablet    TAKE 1 TABLET BY MOUTH ONCE DAILY.    Essential hypertension with goal blood pressure less than 140/90       * LISINOPRIL PO      Take 25 mg by mouth        * lisinopril 20 MG tablet    PRINIVIL/ZESTRIL    90 tablet    TAKE 1 TABLET BY MOUTH ONCE DAILY.    Essential hypertension with goal blood pressure less than 140/90       LORazepam 0.5 MG tablet    ATIVAN     Take 0.5 mg by mouth every 6 hours as needed for anxiety        meclizine 25 MG tablet    ANTIVERT     Take 25 mg by mouth 2 times daily as needed        NITROSTAT 0.4 MG sublingual tablet   Generic drug:  nitroGLYcerin      Place 1 tablet under the tongue every 5 minutes as needed for chest pain        pravastatin 20 MG tablet    PRAVACHOL    90 tablet    TAKE 1 TABLET BY MOUTH AT BEDTIME.    Pure hypercholesterolemia       * SERTRALINE HCL PO      Take 50 mg by mouth        * sertraline 100 MG tablet    ZOLOFT    90 tablet    TAKE 1 TABLET BY MOUTH ONCE DAILY.    Dysthymic disorder       valACYclovir 1000 mg tablet    VALTREX    30 tablet    Take 2 tablets (2,000 mg) by mouth 2 times daily Then take 1 tablet BID for 3 days. May repeat for future outbreaks at onset of lesions.    Herpes dermatitis       * Notice:  This list has 6 medication(s) that are the same as other medications prescribed for you. Read the directions carefully, and ask your doctor or other care provider to review them with you.

## 2018-05-31 NOTE — NURSING NOTE
Patient presents to the clinic for possible shingles. States it started last night with the pain. Has had shingles before.   Maryuri Ga LPN............. May 31, 2018 10:57 AM

## 2018-06-15 DIAGNOSIS — I10 ESSENTIAL HYPERTENSION: Primary | ICD-10-CM

## 2018-06-18 RX ORDER — AMLODIPINE BESYLATE 5 MG/1
TABLET ORAL
Qty: 90 TABLET | Refills: 0 | Status: SHIPPED | OUTPATIENT
Start: 2018-06-18 | End: 2018-09-23

## 2018-06-18 NOTE — TELEPHONE ENCOUNTER
"Refill request from  for:  amLODIPine (NORVASC) 5 MG tablet    LOV 6/19/2017 with Jaycee Ochoa MD  Noted...\"She is going to try going off of her atenolol and will try instead amlodipine 5 mg daily.  Discussed possible side effects.  Follow up in approximately 1 month to recheck blood pressure after switching medications.\"    Noted previous failed attempts to reach patient and reminder letter was sent 5/9/2018.    No upcoming appt noted    Last refill 6/19/2017 for 90 X 3    Also clarification needed regarding hypertension diagnosis for reorder (pulmonary/benign essential)    Routing to PCP for refill consideration    Requested Prescriptions   Pending Prescriptions Disp Refills     amLODIPine (NORVASC) 5 MG tablet [Pharmacy Med Name: AMLODIPINE 5MG TAB] 90 tablet      Sig: TAKE 1 TABLET BY MOUTH ONCE DAILY.    Calcium Channel Blockers Protocol  Failed    6/15/2018  1:06 AM       Failed - Normal serum creatinine on file in past 12 months    Recent Labs   Lab Test  06/19/17   1023   CR  0.69*          Passed - Blood pressure under 140/90 in past 12 months    BP Readings from Last 3 Encounters:   05/31/18 132/80   06/19/17 (!) 142/92   05/24/17 140/80          Passed - Recent (12 mo) or future (30 days) visit within the authorizing provider's specialty    Patient had office visit in the last 12 months or has a visit in the next 30 days with authorizing provider or within the authorizing provider's specialty.  See \"Patient Info\" tab in inbasket, or \"Choose Columns\" in Meds & Orders section of the refill encounter.           Passed - Patient is age 18 or older       Passed - No active pregnancy on record       Passed - No positive pregnancy test in past 12 months      Medication Detail    Disp Refills Start End    amLODIPine (NORVASC) 5 mg tablet 90 tablet 3 6/19/2017     Sig - Route: Take 1 tablet by mouth once daily. - Oral    Class: eRx    E-Prescribing Status: Receipt confirmed by pharmacy " (6/19/2017  9:12 AM CDT)    Associated Diagnoses   HYPERTENSION  - Primary       Unable to complete prescription refill per RN Medication Refill Policy.................... Myla Anderson ....................  6/18/2018   12:20 PM

## 2018-06-26 DIAGNOSIS — E78.00 PURE HYPERCHOLESTEROLEMIA: ICD-10-CM

## 2018-06-26 DIAGNOSIS — F34.1 DYSTHYMIC DISORDER: ICD-10-CM

## 2018-06-26 DIAGNOSIS — I10 ESSENTIAL HYPERTENSION WITH GOAL BLOOD PRESSURE LESS THAN 140/90: ICD-10-CM

## 2018-06-28 ENCOUNTER — HOSPITAL ENCOUNTER (EMERGENCY)
Facility: OTHER | Age: 53
Discharge: HOME | End: 2018-06-28
Attending: PHYSICIAN ASSISTANT | Admitting: PHYSICIAN ASSISTANT
Payer: COMMERCIAL

## 2018-06-28 VITALS
BODY MASS INDEX: 25.61 KG/M2 | OXYGEN SATURATION: 97 % | HEART RATE: 73 BPM | TEMPERATURE: 97.3 F | SYSTOLIC BLOOD PRESSURE: 156 MMHG | RESPIRATION RATE: 16 BRPM | DIASTOLIC BLOOD PRESSURE: 92 MMHG | HEIGHT: 64 IN | WEIGHT: 150 LBS

## 2018-06-28 DIAGNOSIS — K04.7 DENTAL ABSCESS: ICD-10-CM

## 2018-06-28 DIAGNOSIS — K08.89 PAIN, DENTAL: ICD-10-CM

## 2018-06-28 PROCEDURE — 25000125 ZZHC RX 250: Performed by: PHYSICIAN ASSISTANT

## 2018-06-28 PROCEDURE — 99284 EMERGENCY DEPT VISIT MOD MDM: CPT | Mod: 25 | Performed by: PHYSICIAN ASSISTANT

## 2018-06-28 PROCEDURE — 25000128 H RX IP 250 OP 636: Performed by: PHYSICIAN ASSISTANT

## 2018-06-28 PROCEDURE — 96372 THER/PROPH/DIAG INJ SC/IM: CPT | Performed by: PHYSICIAN ASSISTANT

## 2018-06-28 PROCEDURE — 99283 EMERGENCY DEPT VISIT LOW MDM: CPT | Mod: Z6 | Performed by: PHYSICIAN ASSISTANT

## 2018-06-28 RX ORDER — LISINOPRIL 20 MG/1
TABLET ORAL
Qty: 90 TABLET | OUTPATIENT
Start: 2018-06-28

## 2018-06-28 RX ORDER — IBUPROFEN 800 MG/1
800 TABLET, FILM COATED ORAL EVERY 8 HOURS PRN
Qty: 60 TABLET | Refills: 0 | Status: SHIPPED | OUTPATIENT
Start: 2018-06-28 | End: 2018-07-06

## 2018-06-28 RX ORDER — PRAVASTATIN SODIUM 20 MG
TABLET ORAL
Qty: 90 TABLET | OUTPATIENT
Start: 2018-06-28

## 2018-06-28 RX ORDER — SERTRALINE HYDROCHLORIDE 100 MG/1
TABLET, FILM COATED ORAL
Qty: 90 TABLET | OUTPATIENT
Start: 2018-06-28

## 2018-06-28 RX ORDER — HYDROCHLOROTHIAZIDE 25 MG/1
TABLET ORAL
Qty: 90 TABLET | OUTPATIENT
Start: 2018-06-28

## 2018-06-28 RX ORDER — CEFTRIAXONE SODIUM 1 G
1 VIAL (EA) INJECTION ONCE
Status: COMPLETED | OUTPATIENT
Start: 2018-06-28 | End: 2018-06-28

## 2018-06-28 RX ORDER — HYDROCODONE BITARTRATE AND ACETAMINOPHEN 5; 325 MG/1; MG/1
1 TABLET ORAL EVERY 6 HOURS PRN
Qty: 10 TABLET | Refills: 0 | Status: SHIPPED | OUTPATIENT
Start: 2018-06-28 | End: 2018-08-29

## 2018-06-28 RX ADMIN — CEFTRIAXONE SODIUM 1 G: 1 INJECTION, POWDER, FOR SOLUTION INTRAMUSCULAR; INTRAVENOUS at 16:39

## 2018-06-28 ASSESSMENT — ENCOUNTER SYMPTOMS
NECK STIFFNESS: 0
DIFFICULTY URINATING: 0
EYE REDNESS: 0
ARTHRALGIAS: 0
CHILLS: 1
SHORTNESS OF BREATH: 0
FEVER: 0
COLOR CHANGE: 0
ABDOMINAL PAIN: 0
HEADACHES: 0
CONFUSION: 0

## 2018-06-28 NOTE — ED AVS SNAPSHOT
[facility]    [address] [facility] [address]    Phone:  [phone]    Fax:  [phone]                                       Joana Caicedo   MRN: [id number]    Department:  [facility]   Date of Visit:  6/28/2018           Patient Information     Date Of Birth          1965        Your diagnoses for this visit were:     Pain, dental     Dental abscess        You were seen by Michael Mendosa PA-C.      Follow-up Information     Follow up with Jaycee Ochoa MD.    Specialty:  Family Practice    Contact information:    [address] [facility] [id number] [phone]        Your next 10 appointments already scheduled     Jul 31, 2018  9:45 AM CDT   Office Visit with Jaycee Ochoa MD   [facility] ([facility])    [address] [facility] [address] [phone]           Bring a current list of meds and any records pertaining to this visit. For Physicals, please bring immunization records and any forms needing to be filled out. Please arrive 10 minutes early to complete paperwork.              24 Hour Appointment Hotline     To schedule an appointment at [facility], please call [phone]. If you don't have a family doctor or clinic, we will help you find one. [facility] are conveniently located to serve the needs of you and your family.           Review of your medicines      START taking        Dose / Directions Last dose taken    HYDROcodone-acetaminophen 5-325 MG per tablet   Commonly known as:  NORCO   Dose:  1 tablet   Quantity:  10 tablet        Take 1 tablet by mouth every 6 hours as needed for severe pain   Refills:  0        ibuprofen 800 MG tablet   Commonly known as:  ADVIL/MOTRIN   Dose:  800 mg   Quantity:  60 tablet        Take 1 tablet (800 mg) by mouth every 8 hours as needed for moderate pain   Refills:  0          Our records  show that you are taking the medicines listed below. If these are incorrect, please call your family doctor or clinic.        Dose / Directions Last dose taken    albuterol 108 (90 Base) MCG/ACT Inhaler   Commonly known as:  PROAIR HFA/PROVENTIL HFA/VENTOLIN HFA   Dose:  2 puff        Inhale 2 puffs into the lungs every 4 hours as needed   Refills:  0        amLODIPine 5 MG tablet   Commonly known as:  NORVASC   Quantity:  90 tablet        TAKE 1 TABLET BY MOUTH ONCE DAILY.   Refills:  0        aspirin 81 MG EC tablet   Dose:  1 tablet        Take 1 tablet by mouth daily   Refills:  0        ATENOLOL PO   Dose:  50 mg        Take 50 mg by mouth daily   Refills:  0        guaiFENesin-codeine 100-10 MG/5ML Syrp syrup   Commonly known as:  guaiFENesin AC   Dose:  5 mL        Take 5 mLs by mouth nightly as needed for cough Max dose 60 mL per 24 hrs   Refills:  0        hydrochlorothiazide 25 MG tablet   Commonly known as:  HYDRODIURIL   Quantity:  90 tablet        TAKE 1 TABLET BY MOUTH ONCE DAILY.   Refills:  0        lisinopril 20 MG tablet   Commonly known as:  PRINIVIL/ZESTRIL   Quantity:  90 tablet        TAKE 1 TABLET BY MOUTH ONCE DAILY.   Refills:  0        LORazepam 0.5 MG tablet   Commonly known as:  ATIVAN   Dose:  0.5 mg        Take 0.5 mg by mouth every 6 hours as needed for anxiety   Refills:  0        meclizine 25 MG tablet   Commonly known as:  ANTIVERT   Dose:  25 mg        Take 25 mg by mouth 2 times daily as needed   Refills:  0        NITROSTAT 0.4 MG sublingual tablet   Dose:  1 tablet   Generic drug:  nitroGLYcerin        Place 1 tablet under the tongue every 5 minutes as needed for chest pain   Refills:  0        pravastatin 20 MG tablet   Commonly known as:  PRAVACHOL   Quantity:  90 tablet        TAKE 1 TABLET BY MOUTH AT BEDTIME.   Refills:  0        sertraline 100 MG tablet   Commonly known as:  ZOLOFT   Quantity:  90 tablet        TAKE 1 TABLET BY MOUTH ONCE DAILY.   Refills:  0         valACYclovir 1000 mg tablet   Commonly known as:  VALTREX   Dose:  2000 mg   Quantity:  30 tablet        Take 2 tablets (2,000 mg) by mouth 2 times daily For one day, Then take 1 tablet BID for 3 days. May repeat for future outbreaks at onset of lesions.   Refills:  0                Information about OPIOIDS     PRESCRIPTION OPIOIDS: WHAT YOU NEED TO KNOW   We gave you an opioid (narcotic) pain medicine. It is important to manage your pain, but opioids are not always the best choice. You should first try all the other options your care team gave you. Take this medicine for as short a time (and as few doses) as possible.     These medicines have risks:    DO NOT drive when on new or higher doses of pain medicine. These medicines can affect your alertness and reaction times, and you could be arrested for driving under the influence (DUI). If you need to use opioids long-term, talk to your care team about driving.    DO NOT operate heave machinery    DO NOT do any other dangerous activities while taking these medicines.     DO NOT drink any alcohol while taking these medicines.      If the opioid prescribed includes acetaminophen, DO NOT take with any other medicines that contain acetaminophen. Read all labels carefully. Look for the word  acetaminophen  or  Tylenol.  Ask your pharmacist if you have questions or are unsure.    You can get addicted to pain medicines, especially if you have a history of addiction (chemical, alcohol or substance dependence). Talk to your care team about ways to reduce this risk.    Store your pills in a secure place, locked if possible. We will not replace any lost or stolen medicine. If you don t finish your medicine, please throw away (dispose) as directed by your pharmacist. The [place] Pollution Control Agency has more information about safe disposal: https://www.pca.state.[place]/living-green/managing-unwanted-medications.     All opioids tend to cause constipation. Drink plenty of  water and eat foods that have a lot of fiber, such as fruits, vegetables, prune juice, apple juice and high-fiber cereal. Take a laxative (Miralax, milk of magnesia, Colace, Senna) if you don t move your bowels at least every other day.         Prescriptions were sent or printed at these locations (2 Prescriptions)                   Other Prescriptions                Printed at Department/Unit printer (2 of 2)         HYDROcodone-acetaminophen (NORCO) 5-325 MG per tablet               ibuprofen (ADVIL/MOTRIN) 800 MG tablet                Orders Needing Specimen Collection     None      Pending Results     No orders found from 6/26/2018 to 6/29/2018.            Pending Culture Results     No orders found from 6/26/2018 to 6/29/2018.            Pending Results Instructions     If you had any lab results that were not finalized at the time of your Discharge, you can call the ED Lab Result RN at [phone]. You will be contacted by this team for any positive Lab results or changes in treatment. The nurses are available 7 days a week from 10A to 6:30P.  You can leave a message 24 hours per day and they will return your call.        Thank you for choosing [facility]       Thank you for choosing [facility] for your care. Our goal is always to provide you with excellent care. Hearing back from our patients is one way we can continue to improve our services. Please take a few minutes to complete the written survey that you may receive in the mail after you visit with us. Thank you!        Care EveryWhere ID     This is your Care EveryWhere ID. This could be used by other organizations to access your [facility] medical records  [id number]        Equal Access to Services     KINZA RODRIGUEZ : Hadii roberto Bello, wajamesda daniel, qaybta kaalmada bartolo, carrol pizano. [facility] [phone].    ATENCIÓN: Si habla español, tiene a meeks disposición servicios gratuitos de asistencia lingüística.  Myron nye [phone].    We comply with applicable federal civil rights laws and [place] laws. We do not discriminate on the basis of race, color, national origin, age, disability, sex, sexual orientation, or gender identity.            After Visit Summary       This is your record. Keep this with you and show to your community pharmacist(s) and doctor(s) at your next visit.

## 2018-06-28 NOTE — ED AVS SNAPSHOT
[facility]    [address]    [place] [address]    Phone:  [phone]    Fax:  [phone]                                       Joana Caicedo   MRN: [id number]    Department:  [facility]   Date of Visit:  6/28/2018           After Visit Summary Signature Page     I have received my discharge instructions, and my questions have been answered. I have discussed any challenges I see with this plan with the nurse or doctor.    ..........................................................................................................................................  Patient/Patient Representative Signature      ..........................................................................................................................................  Patient Representative Print Name and Relationship to Patient    ..................................................               ................................................  Date                                            Time    ..........................................................................................................................................  Reviewed by Signature/Title    ...................................................              ..............................................  Date                                                            Time

## 2018-06-28 NOTE — TELEPHONE ENCOUNTER
"Filled 5/10/18 #90. Due 8/10/18. Pharmacy alerted.   LOV-06/19/2017 Patient due for annual labs and OV. Call placed to patient who states, \" I only have 7 days left of my pills. Pharmacy only gives me 30 days at a time\". Patient agreed to be transferred to scheduling to set up OV. Contacted [facility] who verified that patient has 60 days remaining on 5/10/18 refills. [facility] to contact patient and let her know. Unable to complete prescription refill per RNMedication Refill Policy.................... Yvonne Esparza ....................  6/28/2018   10:41 AM      sertraline (ZOLOFT) 100 MG tablet 90 tablet 0 5/10/2018  No   Sig: TAKE 1 TABLET BY MOUTH ONCE DAILY.   Class: E-Prescribe   Notes to Pharmacy: Patient enrolled in our Rx Med Sync service to improveadherence. We are requesting a refill authorization inadvance to ensure an active prescription is on file.   Order: [id number]   E-Prescribing Status: Receipt confirmed by pharmacy (5/10/2018  1:43 PM CDT)     pravastatin (PRAVACHOL) 20 MG tablet 90 tablet 0 5/10/2018  No   Sig: TAKE 1 TABLET BY MOUTH AT BEDTIME.   Class: E-Prescribe   Notes to Pharmacy: Patient enrolled in our Rx Med Sync service to improveadherence. We are requesting a refill authorization inadvance to ensure an active prescription is on file.   Order: [id number]   E-Prescribing Status: Receipt confirmed by pharmacy (5/10/2018  1:43 PM CDT     hydrochlorothiazide (HYDRODIURIL) 25 MG tablet 90 tablet 0 5/10/2018  No   Sig: TAKE 1 TABLET BY MOUTH ONCE DAILY.   Class: E-Prescribe   Notes to Pharmacy: Patient enrolled in our Rx Med Sync service to improveadherence. We are requesting a refill authorization inadvance to ensure an active prescription is on file.   Order: [id number]   E-Prescribing Status: Receipt confirmed by pharmacy (5/10/2018  1:43 PM CDT)     lisinopril (PRINIVIL/ZESTRIL) 20 MG tablet 90 tablet 0 5/10/2018  No   Sig: TAKE 1 TABLET BY MOUTH ONCE DAILY.   Class: E-Prescribe   Notes to Pharmacy: " Patient enrolled in our Rx Med Sync service to improveadherence. We are requesting a refill authorization inadvance to ensure an active prescription is on file.  patient IS DUE FOR physical!   Order: [id number]   E-Prescribing Status: Receipt confirmed by pharmacy (5/10/2018  1:43 PM CDT)     [id number] - [place], [place] - [address]

## 2018-06-28 NOTE — ED TRIAGE NOTES
COLUMBIA-SUICIDE SEVERITY RATING SCALE   Screen with Triage Points for Emergency Department      Ask questions that are bolded and underlined.   Past  month   Ask Questions 1 and 2 YES NO   1)  Have you wished you were dead or wished you could go to sleep and not wake up?   x   2)  Have you actually had any thoughts of killing yourself?   x   If YES to 2, ask questions 3, 4, 5, and 6.  If NO to 2, go directly to question 6.   3)  Have you been thinking about how you might do this?   E.g.  I thought about taking an overdose but I never made a specific plan as to when where or how I would actually do it .and I would never go through with it.       4)  Have you had these thoughts and had some intention of acting on them?   As opposed to  I have the thoughts but I definitely will not do anything about them.       5)  Have you started to work out or worked out the details of how to kill yourself? Do you intend to carry out this plan?      6)  Have you ever done anything, started to do anything, or prepared to do anything to end your life?  Examples: Collected pills, obtained a gun, gave away valuables, wrote a will or suicide note, took out pills but didn t swallow any, held a gun but changed your mind or it was grabbed from your hand, went to the roof but didn t jump; or actually took pills, tried to shoot yourself, cut yourself, tried to hang yourself, etc.    If YES, ask: Was this within the past three months?  Lifetime     x    Past 3 Months     x   Item 1:  Behavioral Health Referral at Discharge  Item 2:  Behavioral Health Referral at Discharge   Item 3:  Behavioral Health Consult (Psychiatric Nurse/) and consider Patient Safety Precautions  Item 4:  Immediate Notification of Physician and/or Behavioral Health and Patient Safety Precautions   Item 5:  Immediate Notification of Physician and/or Behavioral Health and Patient Safety Precautions  Item 6:  Over 3 months ago: Behavioral Health Consult  (Psychiatric Nurse/) and consider Patient Safety Precautions  OR  Item 6:  3 months ago or less: Immediate Notification of Physician and/or Behavioral Health and Patient Safety Precautions

## 2018-06-28 NOTE — ED PROVIDER NOTES
History     Chief Complaint   Patient presents with     Dental Pain     HPI Comments: This is a 52 yo female who started getting front lower tooth pain a few days ago.  She called her dentist and he will be seeing her tomorrow.  She has had some chills and warmth to the lower facial area.  She is here for further evaluation at this time.     The history is provided by the patient.         Problem List:    Patient Active Problem List    Diagnosis Date Noted     Hyperlipidemia 02/09/2018     Priority: Medium     Hypertension 02/09/2018     Priority: Medium     Insomnia 02/09/2018     Priority: Medium     Nicotine addiction 02/09/2018     Priority: Medium     Dysthymic disorder 09/02/2009     Priority: Medium        Past Medical History:    Past Medical History:   Diagnosis Date     Personal history of other medical treatment (CODE)      Pure hyperglyceridemia        Past Surgical History:    Past Surgical History:   Procedure Laterality Date     APPENDECTOMY OPEN      No Comments Provided     LAPAROSCOPIC TUBAL LIGATION      No Comments Provided     OTHER SURGICAL HISTORY      32255.0,NH REVISION CERVIX W PREG VAG APPRCH       Family History:    Family History   Problem Relation Age of Onset     HEART DISEASE Father      Heart Disease     Other - See Comments Father       stage III COPD     HEART DISEASE Mother      Heart Disease     Other - See Comments Mother       kidney disease     Diabetes Sister      Diabetes     Other - See Comments Sister       fibromyalgia, celiac disease.     Other - See Comments Other      High cholesterol runs in family       Social History:  Marital Status:  Legally  [3]  Social History   Substance Use Topics     Smoking status: Current Every Day Smoker     Packs/day: 1.00     Years: 31.00     Types: Cigarettes     Smokeless tobacco: Never Used     Alcohol use Yes      Comment: Alcoholic Drinks/day: 2 per month        Medications:      albuterol (PROAIR HFA/PROVENTIL  "HFA/VENTOLIN HFA) 108 (90 BASE) MCG/ACT Inhaler   amLODIPine (NORVASC) 5 MG tablet   aspirin EC 81 MG EC tablet   ATENOLOL PO   guaiFENesin-codeine (GUAIFENESIN AC) 100-10 MG/5ML SYRP syrup   hydrochlorothiazide (HYDRODIURIL) 25 MG tablet   HYDROcodone-acetaminophen (NORCO) 5-325 MG per tablet   ibuprofen (ADVIL/MOTRIN) 800 MG tablet   lisinopril (PRINIVIL/ZESTRIL) 20 MG tablet   LORazepam (ATIVAN) 0.5 MG tablet   meclizine (ANTIVERT) 25 MG tablet   pravastatin (PRAVACHOL) 20 MG tablet   sertraline (ZOLOFT) 100 MG tablet   valACYclovir (VALTREX) 1000 mg tablet   nitroGLYcerin (NITROSTAT) 0.4 MG sublingual tablet   [DISCONTINUED] HYDROCHLOROTHIAZIDE PO   [DISCONTINUED] LISINOPRIL PO   [DISCONTINUED] SERTRALINE HCL PO         Review of Systems   Constitutional: Positive for chills. Negative for fever.   HENT: Positive for dental problem. Negative for congestion.    Eyes: Negative for redness.   Respiratory: Negative for shortness of breath.    Cardiovascular: Negative for chest pain.   Gastrointestinal: Negative for abdominal pain.   Genitourinary: Negative for difficulty urinating.   Musculoskeletal: Negative for arthralgias and neck stiffness.   Skin: Negative for color change.   Neurological: Negative for headaches.   Psychiatric/Behavioral: Negative for confusion.       Physical Exam   BP: (!) 156/92  Pulse: 73  Temp: 97.3  F (36.3  C)  Resp: 16  Height: 162.6 cm (5' 4\")  Weight: 68 kg (150 lb)  SpO2: 97 %      Physical Exam   Constitutional: No distress.   HENT:   Head: Atraumatic.       Mouth/Throat: Oropharynx is clear and moist. No oropharyngeal exudate.   Minimal redness to her lower chin    Eyes: Pupils are equal, round, and reactive to light. No scleral icterus.   Cardiovascular: Normal heart sounds and intact distal pulses.    Pulmonary/Chest: Breath sounds normal. No respiratory distress.   Abdominal: Soft. Bowel sounds are normal. There is no tenderness.   Musculoskeletal: She exhibits no edema or " tenderness.   Skin: Skin is warm. No rash noted. She is not diaphoretic.       ED Course     ED Course     Procedures              No results found for this or any previous visit (from the past 24 hour(s)).    Medications   cefTRIAXone (ROCEPHIN) 1 g in lidocaine (PF) (XYLOCAINE) 1 % injection (not administered)       Assessments & Plan (with Medical Decision Making)     I have reviewed the nursing notes.    I have review ed the findings, diagnosis, plan and need for follow up with the patient.      New Prescriptions    HYDROCODONE-ACETAMINOPHEN (NORCO) 5-325 MG PER TABLET    Take 1 tablet by mouth every 6 hours as needed for severe pain    IBUPROFEN (ADVIL/MOTRIN) 800 MG TABLET    Take 1 tablet (800 mg) by mouth every 8 hours as needed for moderate pain       Final diagnoses:   Pain, dental   Dental abscess     Afebrile.  VSS.  Lower front jaw pain.  Dental abscess and pain.  Dry socket paste applied to tender areas with relief.  She was given rocephin IM in the ER.  Rx for norco and motrin.  She will see her dentist tomorrow for definitive care.  At that point he may start further antibiotics   6/28/2018   [facility] [place]     Michael Mendosa PA-C  06/28/18 4819

## 2018-07-23 NOTE — PROGRESS NOTES
Patient Information     Patient Name  Joana Caicedo MRN  [id number] Sex  Female   1965      Letter by Jaycee Ochoa MD at      Author:  Jaycee Ochoa MD Service:  (none) Author Type:  (none)    Filed:   Encounter Date:  2017 Status:  (Other)           Joana Caicedo  [address] [facility] [address]          2017    Dear Ms. Caicedo:    I wanted to let you know about your recent labs.  Your Cholesterol levels were VERY high.  You calculated risk of cardiovascular disease (such as heart attack or stroke) over the next 10 years is 20.7%.  If you have been taking your pravastatin consistently, I think you should have an increase in your dose.  If you have not been taking it consistently, I would recommend taking it daily, then have your labs rechecked in about 6 weeks to determine if you need a dose increase.  If you haven't been taking it due to side effects, please let me know as we could try a different medication.  Please also let me know if you would prefer an increase in your dose.    Your comprehensive metabolic profile showed that your potassium was mildly decreased.  This is likely a side effect of your hydrochlorothiazide.  I would recommend that you try to eat a banana daily to help increase your potassium level.    All of your other labs were in good range.  If you have questions, please call [phone].      Sincerely,      Jaycee Ochoa MD     Resulted Orders      LIPID PANEL (Collected: 2017  9:30 AM)      Result  Value Ref Range    CHOLESTEROL,TOTAL 353 (H) <200 mg/dL    TRIGLYCERIDES 1206 (H) <150 mg/dL    HDL CHOLESTEROL 31 23 - 92 mg/dL    NON-HDL CHOLESTEROL 322 (H) <145 mg/dl    CHOL/HDL RATIO            11.39 (H) <4.50                    LDL CHOLESTEROL      PATIENT STATUS            FASTING                   COMP METABOLIC PANEL (Collected: 2017  9:30 AM)      Result  Value Ref Range    SODIUM 137 133 - 143 mmol/L    POTASSIUM  3.4 (L) 3.5 - 5.1 mmol/L    CHLORIDE 103 98 - 107 mmol/L    CO2,TOTAL 26 21 - 31 mmol/L    ANION GAP 8 5 - 18                    GLUCOSE 86 70 - 105 mg/dL    CALCIUM 9.3 8.6 - 10.3 mg/dL    BUN 11 7 - 25 mg/dL    CREATININE 0.69 (L) 0.70 - 1.30 mg/dL    BUN/CREAT RATIO           16                    GFR if African American >60 >60 ml/min/1.73m2    GFR if not African American >60 >60 ml/min/1.73m2    ALBUMIN 3.9 3.5 - 5.7 g/dL    PROTEIN,TOTAL 7.0 6.4 - 8.9 g/dL    GLOBULIN                  3.1 2.0 - 3.7 g/dL    A/G RATIO 1.3 1.0 - 2.0                    BILIRUBIN,TOTAL 0.4 0.3 - 1.0 mg/dL    ALK PHOSPHATASE 85 34 - 104 IU/L    ALT (SGPT) 10 7 - 52 IU/L    AST (SGOT) 10 (L) 13 - 39 IU/L   ANTI HCV (Collected: 6/19/2017  9:30 AM)      Result  Value Ref Range    HEPATITIS C ANTIBODY Non-Reactive Non-Reactive    Narrative      Antibodies to HCV not detected; does not exclude the possibility of exposure to HCV.     TSH (Collected: 6/19/2017  9:30 AM)      Result  Value Ref Range    TSH 1.63 0.34 - 5.60 uIU/mL   CBC WITH AUTO DIFFERENTIAL (Collected: 6/19/2017  9:30 AM)      Result  Value Ref Range    WHITE BLOOD COUNT         7.4 4.5 - 11.0 thou/cu mm    RED BLOOD COUNT           4.90 4.00 - 5.20 mil/cu mm    HEMOGLOBIN                14.7 12.0 - 16.0 g/dL    HEMATOCRIT                43.5 33.0 - 51.0 %    MCV                       89 80 - 100 fL    MCH                       30.0 26.0 - 34.0 pg    MCHC                      33.8 32.0 - 36.0 g/dL    RDW                       12.8 11.5 - 15.5 %    PLATELET COUNT            239 140 - 440 thou/cu mm    MPV                       11.5 (H) 6.5 - 11.0 fL    NEUTROPHILS               67.7 42.0 - 72.0 %    LYMPHOCYTES               23.2 20.0 - 44.0 %    MONOCYTES                 5.8 <12.0 %    EOSINOPHILS               1.9 <8.0 %    BASOPHILS                 1.1 <3.0 %    IMMATURE GRANULOCYTES(METAS,MYELOS,PROS) 0.3 %    ABSOLUTE NEUTROPHILS      5.0 1.7 - 7.0 thou/cu mm     ABSOLUTE LYMPHOCYTES      1.7 0.9 - 2.9 thou/cu mm    ABSOLUTE MONOCYTES        0.4 <0.9 thou/cu mm    ABSOLUTE EOSINOPHILS      0.1 <0.5 thou/cu mm    ABSOLUTE BASOPHILS        0.1 <0.3 thou/cu mm    ABSOLUTE IMMATURE GRANULOCYTES(METAS,MYELOS,PROS) 0.0 <=0.3 thou/cu mm

## 2018-07-24 NOTE — PROGRESS NOTES
Patient Information     Patient Name  Joana Caicedo MRN  [id number] Sex  Female   1965      Letter by Jaycee Ochoa MD at      Author:  Jaycee Ochoa MD Service:  (none) Author Type:  (none)    Filed:   Encounter Date:  2017 Status:  (Other)           Joana Caicedo  [address] [facility] [address]          2017    Dear Ms. Caicedo:    This is to remind you that you are due for your follow up office visit with Jaycee Ochoa MD. Your last visit was on 2017.     Additional refills of your medication require you to complete this visit.    Please call [phone] to schedule your appointment.    Thank you for choosing [facility] for your health care needs.    Sincerely,      Refill RN  [facility]

## 2018-08-24 DIAGNOSIS — I10 ESSENTIAL HYPERTENSION WITH GOAL BLOOD PRESSURE LESS THAN 140/90: ICD-10-CM

## 2018-08-24 DIAGNOSIS — E78.00 PURE HYPERCHOLESTEROLEMIA: ICD-10-CM

## 2018-08-24 NOTE — LETTER
August 28, 2018      Joana Caicedo  [address] [place] [id number]        This is to remind you that you are due for your annual labs and office visit with Jaycee Ochoa MD.  Your last visit was on 06/19/2017.     Additional refills of your medication require you to complete this visit.    Please call [phone] to schedule your appointment.    Thank you for choosing [facility] for your health care needs.    Sincerely,      Refill RN  [facility]

## 2018-08-28 RX ORDER — PRAVASTATIN SODIUM 20 MG
20 TABLET ORAL AT BEDTIME
Qty: 90 TABLET | Refills: 0 | Status: SHIPPED | OUTPATIENT
Start: 2018-08-28 | End: 2018-11-02

## 2018-08-28 RX ORDER — LISINOPRIL 20 MG/1
TABLET ORAL
Qty: 90 TABLET | Refills: 0 | Status: SHIPPED | OUTPATIENT
Start: 2018-08-28 | End: 2018-11-02

## 2018-08-28 NOTE — TELEPHONE ENCOUNTER
Lisinopril and Pravastatin  LOV-06/19/2017. Reminder letter sent on 5/9/18. Patient unavailable via phone today. Unable to leave message voice mailbox is full. Will send another reminder letter.     Routing refill request to provider for review/approval because:  Labs and B/P out of range:    Blood pressure under 140/90 in past 12 months           BP Readings from Last 3 Encounters:   06/28/18 (!) 156/92   05/31/18 132/80   06/19/17 (!) 142/92                       Normal serum creatinine on file in past 12 months           Recent Labs   Lab Test  06/19/17   1023   CR  0.69*                  Normal serum potassium on file in past 12 months           Recent Labs   Lab Test  06/19/17   1023   POTASSIUM  3.4*            Unable to complete prescription refill per RNMedication Refill Policy.................... Yvonne Esparza ....................  8/28/2018   10:47 AM

## 2018-08-29 ENCOUNTER — OFFICE VISIT (OUTPATIENT)
Dept: FAMILY MEDICINE | Facility: OTHER | Age: 53
End: 2018-08-29
Attending: NURSE PRACTITIONER
Payer: COMMERCIAL

## 2018-08-29 VITALS
SYSTOLIC BLOOD PRESSURE: 140 MMHG | BODY MASS INDEX: 26.01 KG/M2 | HEIGHT: 63 IN | DIASTOLIC BLOOD PRESSURE: 80 MMHG | HEART RATE: 76 BPM | OXYGEN SATURATION: 97 % | TEMPERATURE: 97.2 F | WEIGHT: 146.8 LBS

## 2018-08-29 DIAGNOSIS — J06.9 VIRAL URI WITH COUGH: Primary | ICD-10-CM

## 2018-08-29 PROCEDURE — 99213 OFFICE O/P EST LOW 20 MIN: CPT | Performed by: NURSE PRACTITIONER

## 2018-08-29 RX ORDER — BENZONATATE 100 MG/1
100 CAPSULE ORAL 3 TIMES DAILY PRN
Qty: 42 CAPSULE | Refills: 0 | Status: SHIPPED | OUTPATIENT
Start: 2018-08-29 | End: 2020-03-03

## 2018-08-29 RX ORDER — ALBUTEROL SULFATE 90 UG/1
2 AEROSOL, METERED RESPIRATORY (INHALATION) EVERY 4 HOURS PRN
Qty: 1 INHALER | Refills: 0 | Status: SHIPPED | OUTPATIENT
Start: 2018-08-29 | End: 2018-11-02

## 2018-08-29 ASSESSMENT — PAIN SCALES - GENERAL: PAINLEVEL: SEVERE PAIN (6)

## 2018-08-29 NOTE — PROGRESS NOTES
HPI:    Joana Caicedo is a 53 year old female  who presents to clinic today for URI.    Symptoms x 2 days.  Symptoms include nasal congestion and drainage, productive cough, sore throat, sinus pressure, body aches, sweats and chills.  No fevers.  Burns in throat and chest with coughing.  Chest sore from coughing.  Mildly winded.  Coughing up phlegm.  Mild sinus headache.  Sore throat improving.  Appetite decreased some.  Energy decreased.      Tried [facility] [place].    Daily smoker about 1 ppd.  Needs refill for Albuterol inhaler, states she has been out and has not been able to use during this illness.          Past Medical History:   Diagnosis Date     Personal history of other medical treatment (CODE)     Demyelination , Per patient, has had mini strokes     Pure hyperglyceridemia     No Comments Provided     Past Surgical History:   Procedure Laterality Date     APPENDECTOMY OPEN      No Comments Provided     LAPAROSCOPIC TUBAL LIGATION      No Comments Provided     OTHER SURGICAL HISTORY      55126.0,[place] REVISION CERVIX W PREG VAG APPRCH     Social History   Substance Use Topics     Smoking status: Current Every Day Smoker     Packs/day: 1.00     Years: 31.00     Types: Cigarettes     Smokeless tobacco: Never Used     Alcohol use Yes      Comment: Alcoholic Drinks/day: 2 per month     Current Outpatient Prescriptions   Medication Sig Dispense Refill     albuterol (PROAIR HFA/PROVENTIL HFA/VENTOLIN HFA) 108 (90 BASE) MCG/ACT Inhaler Inhale 2 puffs into the lungs every 4 hours as needed       amLODIPine (NORVASC) 5 MG tablet TAKE 1 TABLET BY MOUTH ONCE DAILY. 90 tablet 0     aspirin EC 81 MG EC tablet Take 1 tablet by mouth daily       ATENOLOL PO Take 50 mg by mouth daily       hydrochlorothiazide (HYDRODIURIL) 25 MG tablet TAKE 1 TABLET BY MOUTH ONCE DAILY. 90 tablet 0     lisinopril (PRINIVIL/ZESTRIL) 20 MG tablet TAKE 1 TABLET BY MOUTH ONCE DAILY. 90 tablet 0     LORazepam (ATIVAN) 0.5 MG tablet Take 0.5 mg  "by mouth every 6 hours as needed for anxiety       meclizine (ANTIVERT) 25 MG tablet Take 25 mg by mouth 2 times daily as needed       nitroGLYcerin (NITROSTAT) 0.4 MG sublingual tablet Place 1 tablet under the tongue every 5 minutes as needed for chest pain       pravastatin (PRAVACHOL) 20 MG tablet Take 1 tablet (20 mg) by mouth At Bedtime 90 tablet 0     sertraline (ZOLOFT) 100 MG tablet TAKE 1 TABLET BY MOUTH ONCE DAILY. 90 tablet 0     valACYclovir (VALTREX) 1000 mg tablet Take 2 tablets (2,000 mg) by mouth 2 times daily For one day, Then take 1 tablet BID for 3 days. May repeat for future outbreaks at onset of lesions. 30 tablet 0     Allergies   Allergen Reactions     Amoxicillin Other (See Comments)     Yeast infection       Bupropion      Other reaction(s): Other - Describe In Comment Field  Facial and neck swelling     Niacin      Other reaction(s): Flushing     No Clinical Screening - See Comments Other (See Comments)     Migraine med that starts with a \"V\"  cvholesterol med that starts with a \"N\"     Sumatriptan      Other reaction(s): Other - Describe In Comment Field  \"burning veins\"         Past medical history, past surgical history, current medications and allergies reviewed and accurate to the best of my knowledge.        ROS:  Refer to HPI    /80 (BP Location: Left arm, Patient Position: Sitting, Cuff Size: Adult Regular)  Pulse 76  Temp 97.2  F (36.2  C) (Tympanic)  Ht 5' 3.39\" (1.61 m)  Wt 146 lb 12.8 oz (66.6 kg)  SpO2 97%  Breastfeeding? No  BMI 25.69 kg/m2    EXAM:  General Appearance: Miserable appearing adult female, appropriate appearance for age. No acute distress  Head: normocephalic, atraumatic  Ears: Left TM grey, translucent with bony landmarks appreciated, no erythema, no effusion, no bulging, no purulence.  Right TM grey, translucent with bony landmarks appreciated, no erythema, no effusion, no bulging, no purulence.  Left auditory canal clear.  Right auditory canal " clear.  Normal external ears, non tender.  Eyes: conjunctivae normal without erythema or irritation, no drainage or crusting, no eyelid swelling, pupils equal   Orophayrnx: moist mucous membranes, posterior pharynx with erythema, tonsils without hypertrophy, no erythema, no exudates or petechiae, no post nasal drip seen.    Sinuses:  Generalized sinus tenderness upon palpation of the frontal and maxillary sinuses  Nose:  Bilateral nares: no erythema, no edema, no drainage.   Congestion present.  Neck: mild tonsillar lymph node enlargement with tenderness to palpation.  Respiratory: normal chest wall and respirations.  Normal effort.  Clear to auscultation bilaterally, no wheezing, crackles or rhonchi.  No increased work of breathing.  No cough appreciated, oxygen saturation 97%  Cardiac: RRR with no murmurs  Musculoskeletal:  Normal gait.  Equal movement of bilateral upper extremities.  Equal movement of bilateral lower extremities.    Psychological: normal affect, alert and pleasant      Labs:  Not clinically indicated    Xray:  Not clinically indicated         ASSESSMENT/PLAN:    ICD-10-CM    1. Viral URI with cough J06.9 benzonatate (TESSALON) 100 MG capsule    B97.89 albuterol (PROAIR HFA/PROVENTIL HFA/VENTOLIN HFA) 108 (90 Base) MCG/ACT inhaler         URI symptoms x 2 days without fever, consistent with viral illness.      Tessalon 100 mg TID PRN    Renewed Albuterol inhaler - Q 4 hours PRN     Encouraged fluids  Symptomatic treatment - salt water gargles, honey, elevation, humidifier, sinus rinse/netti pot, lozenges, saline nasal spray, etc     Tylenol or ibuprofen PRN    Discouraged going to work for the next 24 to 48 hours due to risk of contagiousness and working with elderly population.    Discussed warning signs/symptoms indicative of need to f/u    Follow up if symptoms persist longer than 2 to 3 weeks or worsen or concerns

## 2018-08-29 NOTE — MR AVS SNAPSHOT
After Visit Summary   8/29/2018    Joana Caicedo    MRN: [id number]           Patient Information     Date Of Birth          1965        Visit Information        Provider Department      8/29/2018 4:30 PM Rica Cano NP [facility]        Today's Diagnoses     Viral URI with cough    -  1      Care Instructions      Viral Upper Respiratory Illness (Adult)  You have a viral upper respiratory illness (URI), which is another term for the common cold. This illness is contagious during the first few days. It is spread through the air by coughing and sneezing. It may also be spread by direct contact (touching the sick person and then touching your own eyes, nose, or mouth). Frequent handwashing will decrease risk of spread. Most viral illnesses go away within 7 to 10 days with rest and simple home remedies. Sometimes the illness may last for several weeks. Antibiotics will not kill a virus, and they are generally not prescribed for this condition.    Home care    If symptoms are severe, rest at home for the first 2 to 3 days. When you resume activity, don't let yourself get too tired.    Avoid being exposed to cigarette smoke (yours or others ).    You may use acetaminophen or ibuprofen to control pain and fever, unless another medicine was prescribed. If you have chronic liver or kidney disease, have ever had a stomach ulcer or gastrointestinal bleeding, or are taking blood-thinning medicines, talk with your healthcare provider before using these medicines. Aspirin should never be given to anyone under 18 years of age who is ill with a viral infection or fever. It may cause severe liver or brain damage.    Your appetite may be poor, so a light diet is fine. Avoid dehydration by drinking 6 to 8 glasses of fluids per day (water, soft drinks, juices, tea, or soup). Extra fluids will help loosen secretions in the nose and lungs.    Over-the-counter cold medicines will not  shorten the length of time you re sick, but they may be helpful for the following symptoms: cough, sore throat, and nasal and sinus congestion. (Note: Do not use decongestants if you have high blood pressure.)  Follow-up care  Follow up with your healthcare provider, or as advised.  When to seek medical advice  Call your healthcare provider right away if any of these occur:    Cough with lots of colored sputum (mucus)    Severe headache; face, neck, or ear pain    Difficulty swallowing due to throat pain    Fever of 100.4 F (38 C) or higher, or as directed by your healthcare provider  Call 911  Call 911 if any of these occur:    Chest pain, shortness of breath, wheezing, or difficulty breathing    Coughing up blood    Inability to swallow due to throat pain  Date Last Reviewed: 9/13/2015 2000-2017 [employer]. [address] [place] [address]. All rights reserved. This information is not intended as a substitute for professional medical care. Always follow your healthcare professional's instructions.                Follow-ups after your visit        Who to contact     If you have questions or need follow up information about today's clinic visit or your schedule please contact [facility] directly at [phone].  Normal or non-critical lab and imaging results will be communicated to you by MyChart, letter or phone within 4 business days after the clinic has received the results. If you do not hear from us within 7 days, please contact the clinic through MyChart or phone. If you have a critical or abnormal lab result, we will notify you by phone as soon as possible.  Submit refill requests through [employer] or call your pharmacy and they will forward the refill request to us. Please allow 3 business days for your refill to be completed.          Additional Information About Your Visit        Care EveryWhere ID     This is your Care EveryWhere ID. This could be used by  "other organizations to access your [place] medical records  [id number]        Your Vitals Were     Pulse Temperature Height Pulse Oximetry Breastfeeding? BMI (Body Mass Index)    76 97.2  F (36.2  C) (Tympanic) 5' 3.39\" (1.61 m) 97% No 25.69 kg/m2       Blood Pressure from Last 3 Encounters:   08/29/18 140/80   06/28/18 (!) 156/92   05/31/18 132/80    Weight from Last 3 Encounters:   08/29/18 146 lb 12.8 oz (66.6 kg)   06/28/18 150 lb (68 kg)   05/31/18 150 lb 8 oz (68.3 kg)              Today, you had the following     No orders found for display         Today's Medication Changes          These changes are accurate as of 8/29/18  4:57 PM.  If you have any questions, ask your nurse or doctor.               Start taking these medicines.        Dose/Directions    benzonatate 100 MG capsule   Commonly known as:  TESSALON   Used for:  Viral URI with cough   Started by:  Rica Cano NP        Dose:  100 mg   Take 1 capsule (100 mg) by mouth 3 times daily as needed for cough   Quantity:  42 capsule   Refills:  0            Where to get your medicines      These medications were sent to [id number] - [place], [place] - [address]  [address], [facility] [address]     Phone:  [phone]     albuterol 108 (90 Base) MCG/ACT inhaler    benzonatate 100 MG capsule                Primary Care Provider Office Phone # Fax #    Jaycee Mita Ochoa [phone]       [address] [facility] [address]        Equal Access to Services     [facility] StanfordEPIF[facility]IO AH: Hadii roberto mark Sotom, waaxda luqadaha, qaybta kaalmada bartolo, carrol pizano. So [facility] [phone].    ATENCIÓN: Si habla español, tiene a meeks disposición servicios gratuitos de asistencia lingüística. Llame al [phone].    We comply with applicable federal civil rights laws and [place] laws. We do not discriminate on the basis of race, color, national origin, " age, disability, sex, sexual orientation, or gender identity.            Thank you!     Thank you for choosing [facility] [place]  for your care. Our goal is always to provide you with excellent care. Hearing back from our patients is one way we can continue to improve our services. Please take a few minutes to complete the written survey that you may receive in the mail after your visit with us. Thank you!             Your Updated Medication List - Protect others around you: Learn how to safely use, store and throw away your medicines at www.disposemymeds.org.          This list is accurate as of 8/29/18  4:57 PM.  Always use your most recent med list.                   Brand Name Dispense Instructions for use Diagnosis    albuterol 108 (90 Base) MCG/ACT inhaler    PROAIR HFA/PROVENTIL HFA/VENTOLIN HFA    1 Inhaler    Inhale 2 puffs into the lungs every 4 hours as needed    Viral URI with cough       amLODIPine 5 MG tablet    NORVASC    90 tablet    TAKE 1 TABLET BY MOUTH ONCE DAILY.    Essential hypertension       aspirin 81 MG EC tablet      Take 1 tablet by mouth daily        ATENOLOL PO      Take 50 mg by mouth daily        benzonatate 100 MG capsule    TESSALON    42 capsule    Take 1 capsule (100 mg) by mouth 3 times daily as needed for cough    Viral URI with cough       hydrochlorothiazide 25 MG tablet    HYDRODIURIL    90 tablet    TAKE 1 TABLET BY MOUTH ONCE DAILY.    Essential hypertension with goal blood pressure less than 140/90       lisinopril 20 MG tablet    PRINIVIL/ZESTRIL    90 tablet    TAKE 1 TABLET BY MOUTH ONCE DAILY.    Essential hypertension with goal blood pressure less than 140/90       LORazepam 0.5 MG tablet    ATIVAN     Take 0.5 mg by mouth every 6 hours as needed for anxiety        meclizine 25 MG tablet    ANTIVERT     Take 25 mg by mouth 2 times daily as needed        NITROSTAT 0.4 MG sublingual tablet   Generic drug:  nitroGLYcerin      Place 1 tablet under the  tongue every 5 minutes as needed for chest pain        pravastatin 20 MG tablet    PRAVACHOL    90 tablet    Take 1 tablet (20 mg) by mouth At Bedtime    Pure hypercholesterolemia       sertraline 100 MG tablet    ZOLOFT    90 tablet    TAKE 1 TABLET BY MOUTH ONCE DAILY.    Dysthymic disorder       valACYclovir 1000 mg tablet    VALTREX    30 tablet    Take 2 tablets (2,000 mg) by mouth 2 times daily For one day, Then take 1 tablet BID for 3 days. May repeat for future outbreaks at onset of lesions.    Herpes dermatitis

## 2018-08-29 NOTE — NURSING NOTE
"Patient presents with nasal congestion congestion, cough green/yellow, sore throat, chills for 2 days. Holly Bonner LPN .............8/29/2018  4:43 PM  Chief Complaint   Patient presents with     URI       Initial /80 (BP Location: Left arm, Patient Position: Sitting, Cuff Size: Adult Regular)  Pulse 76  Temp 97.2  F (36.2  C) (Tympanic)  Ht 5' 3.39\" (1.61 m)  Wt 146 lb 12.8 oz (66.6 kg)  SpO2 97%  Breastfeeding? No  BMI 25.69 kg/m2 Estimated body mass index is 25.69 kg/(m^2) as calculated from the following:    Height as of this encounter: 5' 3.39\" (1.61 m).    Weight as of this encounter: 146 lb 12.8 oz (66.6 kg).  Medication Reconciliation: complete    Holly Bonner LPN  "

## 2018-08-29 NOTE — PATIENT INSTRUCTIONS
Viral Upper Respiratory Illness (Adult)  You have a viral upper respiratory illness (URI), which is another term for the common cold. This illness is contagious during the first few days. It is spread through the air by coughing and sneezing. It may also be spread by direct contact (touching the sick person and then touching your own eyes, nose, or mouth). Frequent handwashing will decrease risk of spread. Most viral illnesses go away within 7 to 10 days with rest and simple home remedies. Sometimes the illness may last for several weeks. Antibiotics will not kill a virus, and they are generally not prescribed for this condition.    Home care    If symptoms are severe, rest at home for the first 2 to 3 days. When you resume activity, don't let yourself get too tired.    Avoid being exposed to cigarette smoke (yours or others ).    You may use acetaminophen or ibuprofen to control pain and fever, unless another medicine was prescribed. If you have chronic liver or kidney disease, have ever had a stomach ulcer or gastrointestinal bleeding, or are taking blood-thinning medicines, talk with your healthcare provider before using these medicines. Aspirin should never be given to anyone under 18 years of age who is ill with a viral infection or fever. It may cause severe liver or brain damage.    Your appetite may be poor, so a light diet is fine. Avoid dehydration by drinking 6 to 8 glasses of fluids per day (water, soft drinks, juices, tea, or soup). Extra fluids will help loosen secretions in the nose and lungs.    Over-the-counter cold medicines will not shorten the length of time you re sick, but they may be helpful for the following symptoms: cough, sore throat, and nasal and sinus congestion. (Note: Do not use decongestants if you have high blood pressure.)  Follow-up care  Follow up with your healthcare provider, or as advised.  When to seek medical advice  Call your healthcare provider right away if any of these  occur:    Cough with lots of colored sputum (mucus)    Severe headache; face, neck, or ear pain    Difficulty swallowing due to throat pain    Fever of 100.4 F (38 C) or higher, or as directed by your healthcare provider  Call 911  Call 911 if any of these occur:    Chest pain, shortness of breath, wheezing, or difficulty breathing    Coughing up blood    Inability to swallow due to throat pain  Date Last Reviewed: 9/13/2015 2000-2017 [employer]. [address] [place] [address]. All rights reserved. This information is not intended as a substitute for professional medical care. Always follow your healthcare professional's instructions.

## 2018-09-08 ENCOUNTER — OFFICE VISIT (OUTPATIENT)
Dept: FAMILY MEDICINE | Facility: OTHER | Age: 53
End: 2018-09-08
Payer: COMMERCIAL

## 2018-09-08 ENCOUNTER — HOSPITAL ENCOUNTER (OUTPATIENT)
Dept: GENERAL RADIOLOGY | Facility: OTHER | Age: 53
Discharge: HOME | End: 2018-09-08
Attending: NURSE PRACTITIONER | Admitting: NURSE PRACTITIONER
Payer: COMMERCIAL

## 2018-09-08 VITALS
DIASTOLIC BLOOD PRESSURE: 72 MMHG | HEART RATE: 86 BPM | TEMPERATURE: 97.5 F | SYSTOLIC BLOOD PRESSURE: 130 MMHG | BODY MASS INDEX: 25.46 KG/M2 | OXYGEN SATURATION: 95 % | WEIGHT: 145.5 LBS

## 2018-09-08 DIAGNOSIS — J20.9 ACUTE BRONCHITIS WITH SYMPTOMS > 10 DAYS: Primary | ICD-10-CM

## 2018-09-08 DIAGNOSIS — R05.8 PRODUCTIVE COUGH: ICD-10-CM

## 2018-09-08 PROCEDURE — 71046 X-RAY EXAM CHEST 2 VIEWS: CPT

## 2018-09-08 PROCEDURE — 99214 OFFICE O/P EST MOD 30 MIN: CPT | Performed by: NURSE PRACTITIONER

## 2018-09-08 RX ORDER — AZITHROMYCIN 250 MG/1
TABLET, FILM COATED ORAL
Qty: 6 TABLET | Refills: 0 | Status: SHIPPED | OUTPATIENT
Start: 2018-09-08 | End: 2018-11-02

## 2018-09-08 RX ORDER — PREDNISONE 20 MG/1
40 TABLET ORAL DAILY
Qty: 6 TABLET | Refills: 0 | Status: SHIPPED | OUTPATIENT
Start: 2018-09-08 | End: 2018-09-11

## 2018-09-08 ASSESSMENT — PAIN SCALES - GENERAL: PAINLEVEL: EXTREME PAIN (8)

## 2018-09-08 NOTE — MR AVS SNAPSHOT
After Visit Summary   9/8/2018    Joana Caicedo    MRN: [id number]           Patient Information     Date Of Birth          1965        Visit Information        Provider Department      9/8/2018 12:30 PM Rica Cano NP [facility]        Today's Diagnoses     Productive cough    -  1    Acute bronchitis with symptoms > 10 days          Care Instructions    Chest xray - no pneumonia    Azithromycin daily x 5 days (antibiotic)    Prednisone daily x 3 days - take with food (steroid)    Follow up if worsening or concerns          Follow-ups after your visit        Future tests that were ordered for you today     Open Future Orders        Priority Expected Expires Ordered    XR Chest 2 Views Routine 9/8/2018 9/8/2019 9/8/2018            Who to contact     If you have questions or need follow up information about today's clinic visit or your schedule please contact [facility] directly at [phone].  Normal or non-critical lab and imaging results will be communicated to you by MyChart, letter or phone within 4 business days after the clinic has received the results. If you do not hear from us within 7 days, please contact the clinic through [employer] or phone. If you have a critical or abnormal lab result, we will notify you by phone as soon as possible.  Submit refill requests through [email or web address] or call your pharmacy and they will forward the refill request to us. Please allow 3 business days for your refill to be completed.          Additional Information About Your Visit        Care EveryWhere ID     This is your Care EveryWhere ID. This could be used by other organizations to access your [facility] medical records  [id number]        Your Vitals Were     Pulse Temperature Pulse Oximetry Breastfeeding? BMI (Body Mass Index)       86 97.5  F (36.4  C) (Tympanic) 95% No 25.46 kg/m2        Blood Pressure from Last 3 Encounters:   09/08/18 130/72    08/29/18 140/80   06/28/18 (!) 156/92    Weight from Last 3 Encounters:   09/08/18 145 lb 8 oz (66 kg)   08/29/18 146 lb 12.8 oz (66.6 kg)   06/28/18 150 lb (68 kg)                 Today's Medication Changes          These changes are accurate as of 9/8/18  1:17 PM.  If you have any questions, ask your nurse or doctor.               Start taking these medicines.        Dose/Directions    azithromycin 250 MG tablet   Commonly known as:  ZITHROMAX   Used for:  Acute bronchitis with symptoms > 10 days   Started by:  Rica Cano NP        Two tablets first day, then one tablet daily for four days.   Quantity:  6 tablet   Refills:  0       predniSONE 20 MG tablet   Commonly known as:  DELTASONE   Used for:  Acute bronchitis with symptoms > 10 days   Started by:  Rica Cano NP        Dose:  40 mg   Take 2 tablets (40 mg) by mouth daily for 3 days   Quantity:  6 tablet   Refills:  0            Where to get your medicines      These medications were sent to [id number] - [place], [place] - [address]  [address] [facility], [facility] [address]     Phone:  [phone]     azithromycin 250 MG tablet    predniSONE 20 MG tablet                Primary Care Provider Office Phone # Fax #    Jaycee Mita Ochoa [phone]       [address] [facility] [address]        Equal Access to Services     [facility] AH: Hadii roberto petito Sotom, waaxda luqadaha, qaybta kaalmada adesandip, waxay clair pizano. So [facility] [phone].    ATENCIÓN: Si habla español, tiene a meeks disposición servicios gratuitos de asistencia lingüística. Llame al [phone].    We comply with applicable federal civil rights laws and [place] laws. We do not discriminate on the basis of race, color, national origin, age, disability, sex, sexual orientation, or gender identity.            Thank you!     Thank you for choosing [facility]  for your care. Our goal is always to provide you with excellent care. Hearing back from our patients is one way we can continue to improve our services. Please take a few minutes to complete the written survey that you may receive in the mail after your visit with us. Thank you!             Your Updated Medication List - Protect others around you: Learn how to safely use, store and throw away your medicines at www.disposemymeds.org.          This list is accurate as of 9/8/18  1:17 PM.  Always use your most recent med list.                   Brand Name Dispense Instructions for use Diagnosis    albuterol 108 (90 Base) MCG/ACT inhaler    PROAIR HFA/PROVENTIL HFA/VENTOLIN HFA    1 Inhaler    Inhale 2 puffs into the lungs every 4 hours as needed    Viral URI with cough       amLODIPine 5 MG tablet    NORVASC    90 tablet    TAKE 1 TABLET BY MOUTH ONCE DAILY.    Essential hypertension       aspirin 81 MG EC tablet      Take 1 tablet by mouth daily        ATENOLOL PO      Take 50 mg by mouth daily        azithromycin 250 MG tablet    ZITHROMAX    6 tablet    Two tablets first day, then one tablet daily for four days.    Acute bronchitis with symptoms > 10 days       benzonatate 100 MG capsule    TESSALON    42 capsule    Take 1 capsule (100 mg) by mouth 3 times daily as needed for cough    Viral URI with cough       hydrochlorothiazide 25 MG tablet    HYDRODIURIL    90 tablet    TAKE 1 TABLET BY MOUTH ONCE DAILY.    Essential hypertension with goal blood pressure less than 140/90       lisinopril 20 MG tablet    PRINIVIL/ZESTRIL    90 tablet    TAKE 1 TABLET BY MOUTH ONCE DAILY.    Essential hypertension with goal blood pressure less than 140/90       LORazepam 0.5 MG tablet    ATIVAN     Take 0.5 mg by mouth every 6 hours as needed for anxiety        meclizine 25 MG tablet    ANTIVERT     Take 25 mg by mouth 2 times daily as needed        NITROSTAT 0.4 MG sublingual tablet   Generic drug:  nitroGLYcerin       Place 1 tablet under the tongue every 5 minutes as needed for chest pain        pravastatin 20 MG tablet    PRAVACHOL    90 tablet    Take 1 tablet (20 mg) by mouth At Bedtime    Pure hypercholesterolemia       predniSONE 20 MG tablet    DELTASONE    6 tablet    Take 2 tablets (40 mg) by mouth daily for 3 days    Acute bronchitis with symptoms > 10 days       sertraline 100 MG tablet    ZOLOFT    90 tablet    TAKE 1 TABLET BY MOUTH ONCE DAILY.    Dysthymic disorder       valACYclovir 1000 mg tablet    VALTREX    30 tablet    Take 2 tablets (2,000 mg) by mouth 2 times daily For one day, Then take 1 tablet BID for 3 days. May repeat for future outbreaks at onset of lesions.    Herpes dermatitis

## 2018-09-08 NOTE — NURSING NOTE
COUGH/CONGESTION  Onset:  14 days  Fever:  unknown  Productive:  yes  Color:  Green/yellow  Shortness of breath:  yes  Chills:  no  Pain scale:   8/10 ribs  Activity Level:  decreased  Appetite:  Normal  Able to sleep:  no  Patient was seen on the 29th and told viral  Holly Bonner LPN .............9/8/2018  12:35 PM

## 2018-09-08 NOTE — PROGRESS NOTES
HPI:    Joana Caicedo is a 53 year old female  who presents to clinic today for cough.    Cough for the past 2 weeks - started on 8/27/18.  Cough continues to worsen.  Chest painful from coughing.  Pain in back from coughing.  Coughing up lots of phlegm.  Cough is constant.  Unable to lay down due to coughing.  Wheezing and feeling short of breath.  No vomiting from coughing.  No known fevers.  No chills or sweats.  Runny and stuffy nose persisting but lessening.  No sinus pressure.  Mild headache.  Throat with feeling of needing to clear but not painful.  Appetite normal.  Energy decreased.      Tried cough drops.  Tried Tessalon without relief.    Using Albuterol inhaler about 8 to 10 times per day.    Daily smoker 1 ppd.        Past Medical History:   Diagnosis Date     Personal history of other medical treatment (CODE)     Demyelination , Per patient, has had mini strokes     Pure hyperglyceridemia     No Comments Provided     Past Surgical History:   Procedure Laterality Date     APPENDECTOMY OPEN      No Comments Provided     LAPAROSCOPIC TUBAL LIGATION      No Comments Provided     OTHER SURGICAL HISTORY      48929.0,OR REVISION CERVIX W PREG VAG APPRCH     Social History   Substance Use Topics     Smoking status: Current Every Day Smoker     Packs/day: 1.00     Years: 31.00     Types: Cigarettes     Smokeless tobacco: Never Used     Alcohol use Yes      Comment: Alcoholic Drinks/day: 2 per month     Current Outpatient Prescriptions   Medication Sig Dispense Refill     albuterol (PROAIR HFA/PROVENTIL HFA/VENTOLIN HFA) 108 (90 Base) MCG/ACT inhaler Inhale 2 puffs into the lungs every 4 hours as needed 1 Inhaler 0     amLODIPine (NORVASC) 5 MG tablet TAKE 1 TABLET BY MOUTH ONCE DAILY. 90 tablet 0     aspirin EC 81 MG EC tablet Take 1 tablet by mouth daily       ATENOLOL PO Take 50 mg by mouth daily       benzonatate (TESSALON) 100 MG capsule Take 1 capsule (100 mg) by mouth 3 times daily as needed for  "cough 42 capsule 0     hydrochlorothiazide (HYDRODIURIL) 25 MG tablet TAKE 1 TABLET BY MOUTH ONCE DAILY. 90 tablet 0     lisinopril (PRINIVIL/ZESTRIL) 20 MG tablet TAKE 1 TABLET BY MOUTH ONCE DAILY. 90 tablet 0     LORazepam (ATIVAN) 0.5 MG tablet Take 0.5 mg by mouth every 6 hours as needed for anxiety       meclizine (ANTIVERT) 25 MG tablet Take 25 mg by mouth 2 times daily as needed       nitroGLYcerin (NITROSTAT) 0.4 MG sublingual tablet Place 1 tablet under the tongue every 5 minutes as needed for chest pain       pravastatin (PRAVACHOL) 20 MG tablet Take 1 tablet (20 mg) by mouth At Bedtime 90 tablet 0     sertraline (ZOLOFT) 100 MG tablet TAKE 1 TABLET BY MOUTH ONCE DAILY. 90 tablet 0     valACYclovir (VALTREX) 1000 mg tablet Take 2 tablets (2,000 mg) by mouth 2 times daily For one day, Then take 1 tablet BID for 3 days. May repeat for future outbreaks at onset of lesions. 30 tablet 0     Allergies   Allergen Reactions     Amoxicillin Other (See Comments)     Yeast infection       Bupropion      Other reaction(s): Other - Describe In Comment Field  Facial and neck swelling     Niacin      Other reaction(s): Flushing     No Clinical Screening - See Comments Other (See Comments)     Migraine med that starts with a \"V\"  cvholesterol med that starts with a \"N\"     Sumatriptan      Other reaction(s): Other - Describe In Comment Field  \"burning veins\"         Past medical history, past surgical history, current medications and allergies reviewed and accurate to the best of my knowledge.        ROS:  Refer to HPI    /72 (BP Location: Right arm, Patient Position: Sitting, Cuff Size: Adult Regular)  Pulse 86  Temp 97.5  F (36.4  C) (Tympanic)  Wt 145 lb 8 oz (66 kg)  SpO2 95%  Breastfeeding? No  BMI 25.46 kg/m2    EXAM:  General Appearance: Miserable appearing adult female, non toxic appearance, appropriate appearance for age. No acute distress  Head: normocephalic, atraumatic  Ears: Left TM grey, " translucent with bony landmarks appreciated, no erythema, no effusion, no bulging, no purulence.  Right TM grey, translucent with bony landmarks appreciated, no erythema, no effusion, no bulging, no purulence.  Left auditory canal clear.  Right auditory canal clear.  Normal external ears, non tender.  Eyes: conjunctivae normal without erythema or irritation, no drainage or crusting, no eyelid swelling, pupils equal   Orophayrnx: moist mucous membranes, posterior pharynx without erythema, tonsils without hypertrophy, no erythema, no exudates or petechiae, no post nasal drip seen, no trismus.    Sinuses:  No sinus tenderness upon palpation of the frontal or maxillary sinuses  Neck: supple without adenopathy  Respiratory: normal chest wall and respirations.  Normal effort.  Bilateral mild rhonchi to auscultation bilaterally, no wheezing.  No increased work of breathing.  Constant deep course congested bronchial cough appreciated, oxygen saturation 95%  Cardiac: RRR with no murmurs  Musculoskeletal:  Normal gait.  Equal movement of bilateral upper extremities.  Equal movement of bilateral lower extremities.    Psychological: normal affect, alert and pleasant        Xray:  PROCEDURE:  XR CHEST 2 VW    HISTORY:  ; Productive cough.     COMPARISON:  May 2017    FINDINGS:   The cardiac silhouette is normal in size. The pulmonary vasculature is  normal.  The lungs are clear. No pleural effusion or pneumothorax.             Impression             IMPRESSION:  No acute cardiopulmonary disease.      JONATHAN AVERY MD            ASSESSMENT/PLAN:    ICD-10-CM    1. Acute bronchitis with symptoms > 10 days J20.9 azithromycin (ZITHROMAX) 250 MG tablet     predniSONE (DELTASONE) 20 MG tablet   2. Productive cough R05 XR Chest 2 Views         CXR completed and reviewed, no infiltrate appreciated, radiologist over read: No acute cardiopulmonary disease.    Azithromycin daily x 5 days     Prednisone 40 mg daily x 3 days - take with  food.    Albuterol inhaler Q 4 hours PRN    Continue Tessalon TID PRN     Mucinex OTC PRN     Encouraged fluids  Symptomatic treatment - salt water gargles, honey, elevation, humidifier, sinus rinse/netti pot, lozenges, etc     Discussed warning signs/symptoms indicative of need to f/u    Follow up if symptoms persist or worsen or concerns

## 2018-09-12 ENCOUNTER — TELEPHONE (OUTPATIENT)
Dept: FAMILY MEDICINE | Facility: OTHER | Age: 53
End: 2018-09-12

## 2018-09-12 NOTE — TELEPHONE ENCOUNTER
[facility] -Patient requesting an appointment for med renewal. Patient is okay to wait until provider returns.

## 2018-09-12 NOTE — TELEPHONE ENCOUNTER
Is there a day/time patient could be seen, or wait for next available appt. Patient last appt with PCP 6/19/17.   Krystyna Kennedy LPN .............9/12/2018     2:20 PM

## 2018-09-13 NOTE — TELEPHONE ENCOUNTER
She needs a physical and has had multiple notifications to make an appointment when she has had medications refilled.  I can't work her in for this.  Sorry!  Jaycee Ochoa MD on 9/13/2018 at 8:24 AM

## 2018-09-18 NOTE — TELEPHONE ENCOUNTER
Patient has not called back. Will close note and wait for patient to call back if needed. Kalie Hodges LPN ....................9/18/2018  10:03 AM

## 2018-09-19 DIAGNOSIS — F34.1 DYSTHYMIC DISORDER: ICD-10-CM

## 2018-09-19 RX ORDER — SERTRALINE HYDROCHLORIDE 100 MG/1
TABLET, FILM COATED ORAL
Qty: 90 TABLET | Refills: 0 | Status: SHIPPED | OUTPATIENT
Start: 2018-09-19 | End: 2018-11-02

## 2018-09-19 NOTE — TELEPHONE ENCOUNTER
"Refer to note below from call center. Patient requesting a refill of Sertraline until able to be seen by PCP. PCP is out. Will route to teamlet for consideration.     LOV with PCP-06/19/2017    Return call to patient who states, \" I know I was suppose to come in and see [facility] but she is so hard to get into. I work 8-12 hour shifts for 8-10 days in a row. I passed out at work. I don't  know if it is from the stress or from not having my sertraline\". Writer recommended patient be seen today. \"I am fine. I have to go back to work \". Patient aware of risks of non-compliance. \"I will call back and schedule an OV. Will someone fill this for me until then?\"    Will route to teamlet for consideration.       Unable to complete prescription refill per RNMedication Refill Policy.................... Yvonne Esparza ....................  9/19/2018   11:15 AM          "

## 2018-09-19 NOTE — TELEPHONE ENCOUNTER
Patient states she has been out of her Sertraline for 8 days now and she needs a refill for it today as she passed out at work today.  Thank you

## 2018-09-23 DIAGNOSIS — I10 ESSENTIAL HYPERTENSION: ICD-10-CM

## 2018-09-26 DIAGNOSIS — I10 ESSENTIAL HYPERTENSION WITH GOAL BLOOD PRESSURE LESS THAN 140/90: ICD-10-CM

## 2018-09-26 NOTE — TELEPHONE ENCOUNTER
[id number] sent Rx request for the following:  hydrochlorothiazide (HYDRODIURIL) 25 MG tablet  TAKE 1 TABLET BY MOUTH ONCE DAILY.  Last Written Prescription Date:  5/10/18  Last Fill Quantity: 90,   # refills: 0  Last Office Visit: 6/19/17  Future Office visit:     Routing refill request to provider for review/approval because:  Diuretics (Including Combos) Protocol Failed9/26 11:24 AM   Normal serum creatinine on file in past 12 months    Normal serum potassium on file in past 12 months    Normal serum sodium on file in past 12 months     Per Unit #4 refill nurse's note earlier today for alternate medication request:  Patient remains due for an OV and labs after multiply notifications. Unable to reach patient today via phone. Mailbox is full. Will route to PCP for consideration.    Unable to complete prescription refill per RN Medication Refill Policy. Anna Lambert RN .............. 9/26/2018  11:36 AM

## 2018-09-26 NOTE — TELEPHONE ENCOUNTER
[facility]  LOV-06/19/2017.    Patient remains due for an OV and labs after multiply notifications. Unable to reach patient today via phone. Mailbox is full. Will route to PCP for consideration.    Unable to complete prescription refill per RNMedication Refill Policy.................... Yvonne Esparza ....................  9/26/2018   9:20 AM

## 2018-10-01 RX ORDER — AMLODIPINE BESYLATE 5 MG/1
TABLET ORAL
Qty: 90 TABLET | Refills: 0 | Status: SHIPPED | OUTPATIENT
Start: 2018-10-01 | End: 2018-11-02

## 2018-10-01 RX ORDER — HYDROCHLOROTHIAZIDE 25 MG/1
TABLET ORAL
Qty: 90 TABLET | Refills: 0 | Status: SHIPPED | OUTPATIENT
Start: 2018-10-01 | End: 2018-11-02

## 2018-11-02 ENCOUNTER — OFFICE VISIT (OUTPATIENT)
Dept: FAMILY MEDICINE | Facility: OTHER | Age: 53
End: 2018-11-02
Attending: FAMILY MEDICINE
Payer: COMMERCIAL

## 2018-11-02 VITALS
SYSTOLIC BLOOD PRESSURE: 128 MMHG | HEART RATE: 80 BPM | TEMPERATURE: 97.2 F | WEIGHT: 150 LBS | HEIGHT: 64 IN | BODY MASS INDEX: 25.61 KG/M2 | DIASTOLIC BLOOD PRESSURE: 80 MMHG | RESPIRATION RATE: 22 BRPM

## 2018-11-02 DIAGNOSIS — E78.5 HYPERLIPIDEMIA, UNSPECIFIED HYPERLIPIDEMIA TYPE: ICD-10-CM

## 2018-11-02 DIAGNOSIS — F34.1 DYSTHYMIC DISORDER: ICD-10-CM

## 2018-11-02 DIAGNOSIS — Z86.19 H/O COLD SORES: ICD-10-CM

## 2018-11-02 DIAGNOSIS — M25.561 CHRONIC PAIN OF RIGHT KNEE: ICD-10-CM

## 2018-11-02 DIAGNOSIS — I10 ESSENTIAL HYPERTENSION: Primary | ICD-10-CM

## 2018-11-02 DIAGNOSIS — J98.9 REACTIVE AIRWAY DISEASE THAT IS NOT ASTHMA: ICD-10-CM

## 2018-11-02 DIAGNOSIS — G89.29 CHRONIC PAIN OF RIGHT KNEE: ICD-10-CM

## 2018-11-02 DIAGNOSIS — Z00.00 HEALTHCARE MAINTENANCE: ICD-10-CM

## 2018-11-02 PROCEDURE — 99396 PREV VISIT EST AGE 40-64: CPT | Performed by: FAMILY MEDICINE

## 2018-11-02 RX ORDER — VALACYCLOVIR HYDROCHLORIDE 1 G/1
2000 TABLET, FILM COATED ORAL 2 TIMES DAILY
Qty: 30 TABLET | Refills: 11 | Status: SHIPPED | OUTPATIENT
Start: 2018-11-02 | End: 2019-11-11

## 2018-11-02 RX ORDER — CITALOPRAM HYDROBROMIDE 20 MG/1
TABLET ORAL
Qty: 90 TABLET | Refills: 3 | Status: SHIPPED | OUTPATIENT
Start: 2018-11-02 | End: 2019-12-05

## 2018-11-02 RX ORDER — AMLODIPINE BESYLATE 5 MG/1
TABLET ORAL
Qty: 90 TABLET | Refills: 3 | Status: SHIPPED | OUTPATIENT
Start: 2018-11-02 | End: 2019-11-23

## 2018-11-02 RX ORDER — ALBUTEROL SULFATE 90 UG/1
2 AEROSOL, METERED RESPIRATORY (INHALATION) EVERY 4 HOURS PRN
Qty: 1 INHALER | Refills: 11 | Status: SHIPPED | OUTPATIENT
Start: 2018-11-02 | End: 2020-03-03

## 2018-11-02 RX ORDER — DIAZEPAM 2 MG
2 TABLET ORAL
Qty: 10 TABLET | Refills: 0 | Status: SHIPPED | OUTPATIENT
Start: 2018-11-02 | End: 2020-03-03

## 2018-11-02 RX ORDER — LISINOPRIL 20 MG/1
TABLET ORAL
Qty: 90 TABLET | Refills: 3 | Status: SHIPPED | OUTPATIENT
Start: 2018-11-02 | End: 2019-11-23

## 2018-11-02 RX ORDER — HYDROCHLOROTHIAZIDE 25 MG/1
TABLET ORAL
Qty: 90 TABLET | Refills: 0 | Status: SHIPPED | OUTPATIENT
Start: 2018-11-02 | End: 2019-01-16

## 2018-11-02 RX ORDER — LORAZEPAM 0.5 MG/1
0.5 TABLET ORAL EVERY 6 HOURS PRN
Qty: 60 TABLET | Refills: 2 | Status: SHIPPED | OUTPATIENT
Start: 2018-11-02 | End: 2019-10-18

## 2018-11-02 RX ORDER — ROSUVASTATIN CALCIUM 20 MG/1
20 TABLET, COATED ORAL DAILY
Qty: 90 TABLET | Refills: 3 | Status: SHIPPED | OUTPATIENT
Start: 2018-11-02 | End: 2020-01-27

## 2018-11-02 ASSESSMENT — ANXIETY QUESTIONNAIRES
GAD7 TOTAL SCORE: 13
7. FEELING AFRAID AS IF SOMETHING AWFUL MIGHT HAPPEN: MORE THAN HALF THE DAYS
3. WORRYING TOO MUCH ABOUT DIFFERENT THINGS: MORE THAN HALF THE DAYS
2. NOT BEING ABLE TO STOP OR CONTROL WORRYING: MORE THAN HALF THE DAYS
IF YOU CHECKED OFF ANY PROBLEMS ON THIS QUESTIONNAIRE, HOW DIFFICULT HAVE THESE PROBLEMS MADE IT FOR YOU TO DO YOUR WORK, TAKE CARE OF THINGS AT HOME, OR GET ALONG WITH OTHER PEOPLE: SOMEWHAT DIFFICULT
5. BEING SO RESTLESS THAT IT IS HARD TO SIT STILL: NOT AT ALL
1. FEELING NERVOUS, ANXIOUS, OR ON EDGE: NEARLY EVERY DAY
6. BECOMING EASILY ANNOYED OR IRRITABLE: NEARLY EVERY DAY

## 2018-11-02 ASSESSMENT — PATIENT HEALTH QUESTIONNAIRE - PHQ9
SUM OF ALL RESPONSES TO PHQ QUESTIONS 1-9: 13
5. POOR APPETITE OR OVEREATING: SEVERAL DAYS

## 2018-11-02 ASSESSMENT — PAIN SCALES - GENERAL: PAINLEVEL: SEVERE PAIN (6)

## 2018-11-02 NOTE — PATIENT INSTRUCTIONS
To switch from zoloft to celexa:  Week #1:  Take zoloft 50 mg (1/2 of 100 mg) with Celexa 10 mg (1/2 of 20 mg) once daily.  Week #2:  Take zoloft 25 mg (1/4 of 100 mg) with Celexa 20 mg (full tablet) once daily.  Week #3:  Stop zoloft.  continue on celexa 20 mg daily.

## 2018-11-02 NOTE — PROGRESS NOTES
"  SUBJECTIVE:   Nursing Notes:   Kalie Hodges LPN  11/2/2018  2:22 PM  Unsigned  Chief Complaint   Patient presents with     Physical       Initial /80 (BP Location: Right arm, Patient Position: Sitting, Cuff Size: Adult Regular)  Pulse 80  Temp 97.2  F (36.2  C) (Tympanic)  Resp 22  Ht 5' 3.5\" (1.613 m)  Wt 150 lb (68 kg)  Breastfeeding? No  BMI 26.15 kg/m2 Estimated body mass index is 26.15 kg/(m^2) as calculated from the following:    Height as of this encounter: 5' 3.5\" (1.613 m).    Weight as of this encounter: 150 lb (68 kg).  Medication Reconciliation: complete    Kalie Hodges LPN    Joana Caicedo is a 53 year old female who presents to clinic today for a physical.    She complains that her mood has been a problem lately.  She feels very down and depressed.  She worries a lot about lots of things.  She has been on Zoloft recently, which she feels is not adequate for her.    Her right knee has also been bothering her for quite a while.  Right knee gives out on her.  Kneecap feels like it moves.  Locks at times as well.    HPI    I personally reviewed medications/allergies/history listed below:    Patient Active Problem List    Diagnosis Date Noted     Hyperlipidemia 02/09/2018     Priority: Medium     Hypertension 02/09/2018     Priority: Medium     Insomnia 02/09/2018     Priority: Medium     Nicotine addiction 02/09/2018     Priority: Medium     Dysthymic disorder 09/02/2009     Priority: Medium     Past Medical History:   Diagnosis Date     Personal history of other medical treatment (CODE)     Demyelination , Per patient, has had mini strokes     Pure hyperglyceridemia     No Comments Provided      Past Surgical History:   Procedure Laterality Date     APPENDECTOMY OPEN      No Comments Provided     CERCLAGE CERVICAL      x2 with last 2 pregnancies.     LAPAROSCOPIC TUBAL LIGATION      No Comments Provided     Family History   Problem Relation Age of Onset     HEART DISEASE " Father      Heart Disease     Other - See Comments Father       stage III COPD     HEART DISEASE Mother      Heart Disease     Other - See Comments Mother       kidney disease     Other - See Comments Other      High cholesterol runs in family     Diabetes Sister      Diabetes     Other - See Comments Sister       fibromyalgia, celiac disease.     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  She has four children ages 22 to about 15 who are all living at home.  She is  from her  and wants to be  but financially cannot afford that.  Her hu    kat is living with the family in an apartment.  He has a girlfriend and Joana has a boyfriend as well.     Current Outpatient Prescriptions   Medication Sig Dispense Refill     albuterol (PROAIR HFA/PROVENTIL HFA/VENTOLIN HFA) 108 (90 Base) MCG/ACT inhaler Inhale 2 puffs into the lungs every 4 hours as needed 1 Inhaler 11     amLODIPine (NORVASC) 5 MG tablet TAKE 1 TABLET BY MOUTH ONCE DAILY. 90 tablet 3     aspirin EC 81 MG EC tablet Take 1 tablet by mouth daily       ATENOLOL PO Take 50 mg by mouth daily       benzonatate (TESSALON) 100 MG capsule Take 1 capsule (100 mg) by mouth 3 times daily as needed for cough 42 capsule 0     citalopram (CELEXA) 20 MG tablet Take 1/2 tablet (10 mg) for 1-2 weeks, then increase to 1 tablet orally daily 90 tablet 3     diazepam (VALIUM) 2 MG tablet Take 1 tablet (2 mg) by mouth nightly as needed for anxiety 10 tablet 0     hydrochlorothiazide (HYDRODIURIL) 25 MG tablet TAKE 1 TABLET BY MOUTH ONCE DAILY. 90 tablet 0     lisinopril (PRINIVIL/ZESTRIL) 20 MG tablet TAKE 1 TABLET BY MOUTH  "ONCE DAILY. 90 tablet 3     LORazepam (ATIVAN) 0.5 MG tablet Take 1 tablet (0.5 mg) by mouth every 6 hours as needed for anxiety 60 tablet 2     meclizine (ANTIVERT) 25 MG tablet Take 25 mg by mouth 2 times daily as needed       nitroGLYcerin (NITROSTAT) 0.4 MG sublingual tablet Place 1 tablet under the tongue every 5 minutes as needed for chest pain       rosuvastatin (CRESTOR) 20 MG tablet Take 1 tablet (20 mg) by mouth daily 90 tablet 3     valACYclovir (VALTREX) 1000 mg tablet Take 2 tablets (2,000 mg) by mouth 2 times daily For one day, Then take 1 tablet BID for 3 days. May repeat for future outbreaks at onset of lesions. 30 tablet 11     Allergies   Allergen Reactions     Amoxicillin Other (See Comments)     Yeast infection       Bupropion      Other reaction(s): Other - Describe In Comment Field  Facial and neck swelling     Niacin      Other reaction(s): Flushing     No Clinical Screening - See Comments Other (See Comments)     Migraine med that starts with a \"V\"  cvholesterol med that starts with a \"N\"     Sumatriptan      Other reaction(s): Other - Describe In Comment Field  \"burning veins\"       Review of Systems   Constitutional: Negative for fatigue and fever.   Respiratory: Negative for cough.    Musculoskeletal: Positive for arthralgias.   Psychiatric/Behavioral: Positive for mood changes. The patient is nervous/anxious.         OBJECTIVE:     /80 (BP Location: Right arm, Patient Position: Sitting, Cuff Size: Adult Regular)  Pulse 80  Temp 97.2  F (36.2  C) (Tympanic)  Resp 22  Ht 5' 3.5\" (1.613 m)  Wt 150 lb (68 kg)  Breastfeeding? No  BMI 26.15 kg/m2  Body mass index is 26.15 kg/(m^2).  Physical Exam   Constitutional: She is oriented to person, place, and time. She appears well-developed and well-nourished. No distress.   HENT:   Head: Normocephalic.   Right Ear: Tympanic membrane and external ear normal.   Left Ear: Tympanic membrane and external ear normal.   Nose: Nose normal. "   Mouth/Throat: Oropharynx is clear and moist. No oropharyngeal exudate.   Eyes: Conjunctivae are normal. Pupils are equal, round, and reactive to light. Right eye exhibits no discharge. Left eye exhibits no discharge.   Neck: Neck supple. No tracheal deviation present. No thyromegaly present.   Cardiovascular: Normal rate, regular rhythm, S1 normal, S2 normal, normal heart sounds, intact distal pulses and normal pulses.  Exam reveals no gallop, no S3, no S4 and no friction rub.    No murmur heard.  Pulmonary/Chest: Effort normal and breath sounds normal. No respiratory distress. She has no wheezes. She has no rales.   Breast exam:  No masses palpable.  No skin changes, tethering or axillary lymphadenopathy.   Abdominal: Soft. Bowel sounds are normal. She exhibits no distension and no mass. There is no hepatosplenomegaly. There is no tenderness.   Genitourinary: No breast swelling, tenderness or discharge.   Genitourinary Comments: Pelvic/Rectal exams deferred per patient.   Musculoskeletal: Normal range of motion. She exhibits no edema.   right knee: No effusion noted.  No joint line tenderness.  Negative anterior/posterior drawer test.  Negative Erinn's.   Lymphadenopathy:     She has no cervical adenopathy.   Neurological: She is alert and oriented to person, place, and time. She has normal strength and normal reflexes. She exhibits normal muscle tone.   Skin: Skin is warm and dry. No rash noted.   Psychiatric: She has a normal mood and affect. Judgment and thought content normal. Cognition and memory are normal.         PHQ-9 SCORE 9/16/2016 6/19/2017 11/2/2018   Total Score 6 8 13       PHQ-2 Score:     PHQ-2 ( 1999 Pf[employer]) 5/31/2018   Q1: Little interest or pleasure in doing things 0   Q2: Feeling down, depressed or hopeless 0   PHQ-2 Score 0       MAUDE-7 SCORE 9/16/2016 6/19/2017 11/2/2018   Total Score 5 7 13         No flowsheet data found.      I personally reviewed results withpatient as listed below:    Diagnostic Test Results:  none     ASSESSMENT/PLAN:       ICD-10-CM    1. Essential hypertension I10 amLODIPine (NORVASC) 5 MG tablet     hydrochlorothiazide (HYDRODIURIL) 25 MG tablet     lisinopril (PRINIVIL/ZESTRIL) 20 MG tablet     Comprehensive Metabolic Panel     Thyrotropin GH   2. Hyperlipidemia, unspecified hyperlipidemia type E78.5 rosuvastatin (CRESTOR) 20 MG tablet     Comprehensive Metabolic Panel     Lipid Panel   3. H/O cold sores Z86.19 valACYclovir (VALTREX) 1000 mg tablet   4. Reactive airway disease that is not asthma R09.89 albuterol (PROAIR HFA/PROVENTIL HFA/VENTOLIN HFA) 108 (90 Base) MCG/ACT inhaler     CBC with platelets differential   5. Dysthymic disorder F34.1 LORazepam (ATIVAN) 0.5 MG tablet     diazepam (VALIUM) 2 MG tablet     citalopram (CELEXA) 20 MG tablet     Vitamin D Total   6. Chronic pain of right knee M25.561 XR Knee Standing 2v  Bilateral & 2v Right    G89.29    7. Healthcare maintenance Z00.00        1.  Blood pressure is stable.  Meds refilled as above.  She will return to clinic in the near future for labs as above.  2.  Crestor will be substituted for her simvastatin.  Her simvastatin has not been effective in the past.  Labs will be completed as above.  3.  Valtrex refilled.  4.  Albuterol refilled.  5.  Ativan refilled.  She had tried paxil in the past, which caused tremors.  Effexor gave her side effects.  She also was given a small amount of Valium to use at bedtime until the Celexa is working better for her.  Transition from Zoloft to Celexa as noted below.  Follow-up in approximately 4-6 weeks.  Patient Instructions   To switch from zoloft to celexa:  Week #1:  Take zoloft 50 mg (1/2 of 100 mg) with Celexa 10 mg (1/2 of 20 mg) once daily.  Week #2:  Take zoloft 25 mg (1/4 of 100 mg) with Celexa 20 mg (full tablet) once daily.  Week #3:  Stop zoloft.  continue on celexa 20 mg daily.  6.  She was willing to do an x-ray of her knee, but wanted to wait until later to  do that.  Order placed for future x-ray to be completed when she returns to clinic for labs as above.  7.  Mammogram is up-to-date.  Pap is up-to-date.  She declines colonoscopy and DEXA scan.  Vaccines are up-to-date.  She has already had her flu shot this season.    Jaycee Ochoa MD  [facility]

## 2018-11-02 NOTE — MR AVS SNAPSHOT
After Visit Summary   11/2/2018    Joana Caicedo    MRN: [id number]           Patient Information     Date Of Birth          1965        Visit Information        Provider Department      11/2/2018 2:00 PM Jaycee Ochoa MD [facility]        Today's Diagnoses     Hyperlipidemia, unspecified hyperlipidemia type    -  1    Essential hypertension        H/O cold sores        Reactive airway disease that is not asthma        Dysthymic disorder        Chronic pain of right knee          Care Instructions    To switch from zoloft to celexa:  Week #1:  Take zoloft 50 mg (1/2 of 100 mg) with Celexa 10 mg (1/2 of 20 mg) once daily.  Week #2:  Take zoloft 25 mg (1/4 of 100 mg) with Celexa 20 mg (full tablet) once daily.  Week #3:  Stop zoloft.  continue on celexa 20 mg daily.          Follow-ups after your visit        Future tests that were ordered for you today     Open Future Orders        Priority Expected Expires Ordered    XR Knee Standing 2v  Bilateral & 2v Right Routine 11/2/2018 11/2/2019 11/2/2018            Who to contact     If you have questions or need follow up information about today's clinic visit or your schedule please contact [facility] directly at [phone].  Normal or non-critical lab and imaging results will be communicated to you by [email or web address], letter or phone within 4 business days after the clinic has received the results. If you do not hear from us within 7 days, please contact the clinic through [email or web address] or phone. If you have a critical or abnormal lab result, we will notify you by phone as soon as possible.  Submit refill requests through Skipo or call your pharmacy and they will forward the refill request to us. Please allow 3 business days for your refill to be completed.          Additional Information About Your Visit        Care EveryWhere ID     This is your Care EveryWhere ID. This could be used by other  "organizations to access your [facility] medical records  [id number]        Your Vitals Were     Pulse Temperature Respirations Height Breastfeeding? BMI (Body Mass Index)    80 97.2  F (36.2  C) (Tympanic) 22 5' 3.5\" (1.613 m) No 26.15 kg/m2       Blood Pressure from Last 3 Encounters:   11/02/18 128/80   09/08/18 130/72   08/29/18 140/80    Weight from Last 3 Encounters:   11/02/18 150 lb (68 kg)   09/08/18 145 lb 8 oz (66 kg)   08/29/18 146 lb 12.8 oz (66.6 kg)                 Today's Medication Changes          These changes are accurate as of 11/2/18  3:19 PM.  If you have any questions, ask your nurse or doctor.               Start taking these medicines.        Dose/Directions    citalopram 20 MG tablet   Commonly known as:  celeXA   Used for:  Dysthymic disorder   Started by:  Jaycee Ochoa MD        Take 1/2 tablet (10 mg) for 1-2 weeks, then increase to 1 tablet orally daily   Quantity:  90 tablet   Refills:  3       diazepam 2 MG tablet   Commonly known as:  VALIUM   Used for:  Dysthymic disorder   Started by:  Jaycee Ochoa MD        Dose:  2 mg   Take 1 tablet (2 mg) by mouth nightly as needed for anxiety   Quantity:  10 tablet   Refills:  0       rosuvastatin 20 MG tablet   Commonly known as:  CRESTOR   Used for:  Hyperlipidemia, unspecified hyperlipidemia type   Started by:  Jaycee Ochoa MD        Dose:  20 mg   Take 1 tablet (20 mg) by mouth daily   Quantity:  90 tablet   Refills:  3         Stop taking these medicines if you haven't already. Please contact your care team if you have questions.     pravastatin 20 MG tablet   Commonly known as:  PRAVACHOL   Stopped by:  Jaycee Ochoa MD           sertraline 100 MG tablet   Commonly known as:  ZOLOFT   Stopped by:  Jaycee Ochoa MD                Where to get your medicines      These medications were sent to [id number] - [place], [place] - [address]  [address] [place] [facility] [address]     Phone:  [phone]     albuterol 108 (90 Base) MCG/ACT inhaler    amLODIPine 5 MG tablet    citalopram 20 MG tablet    hydrochlorothiazide 25 MG tablet    lisinopril 20 MG tablet    rosuvastatin 20 MG tablet         Some of these will need a paper prescription and others can be bought over the counter.  Ask your nurse if you have questions.     Bring a paper prescription for each of these medications     diazepam 2 MG tablet    LORazepam 0.5 MG tablet    valACYclovir 1000 mg tablet                Primary Care Provider Office Phone # Fax #    Jaycee Mita Ochoa [phone]       [address]  [facility] [address]        Equal Access to Services     KINZA RODRIGUEZ : Antonio Bello, wapretty sanchez, qaybta kaalmada bartolo, carrol pizano. So [facility] [phone].    ATENCIÓN: Si habla español, tiene a meeks disposición servicios gratuitos de asistencia lingüística. [facility] [phone].    We comply with applicable federal civil rights laws and [place] laws. We do not discriminate on the basis of race, color, national origin, age, disability, sex, sexual orientation, or gender identity.            Thank you!     Thank you for choosing [facility]  for your care. Our goal is always to provide you with excellent care. Hearing back from our patients is one way we can continue to improve our services. Please take a few minutes to complete the written survey that you may receive in the mail after your visit with us. Thank you!             Your Updated Medication List - Protect others around you: Learn how to safely use, store and throw away your medicines at www.disposemymeds.org.          This list is accurate as of 11/2/18  3:19 PM.  Always use your most recent med list.                   Brand Name Dispense Instructions for use Diagnosis    albuterol 108 (90 Base) MCG/ACT inhaler    PROAIR  HFA/PROVENTIL HFA/VENTOLIN HFA    1 Inhaler    Inhale 2 puffs into the lungs every 4 hours as needed    Reactive airway disease that is not asthma       amLODIPine 5 MG tablet    NORVASC    90 tablet    TAKE 1 TABLET BY MOUTH ONCE DAILY.    Essential hypertension       aspirin 81 MG EC tablet      Take 1 tablet by mouth daily        ATENOLOL PO      Take 50 mg by mouth daily        benzonatate 100 MG capsule    TESSALON    42 capsule    Take 1 capsule (100 mg) by mouth 3 times daily as needed for cough    Viral URI with cough       citalopram 20 MG tablet    celeXA    90 tablet    Take 1/2 tablet (10 mg) for 1-2 weeks, then increase to 1 tablet orally daily    Dysthymic disorder       diazepam 2 MG tablet    VALIUM    10 tablet    Take 1 tablet (2 mg) by mouth nightly as needed for anxiety    Dysthymic disorder       hydrochlorothiazide 25 MG tablet    HYDRODIURIL    90 tablet    TAKE 1 TABLET BY MOUTH ONCE DAILY.    Essential hypertension       lisinopril 20 MG tablet    PRINIVIL/ZESTRIL    90 tablet    TAKE 1 TABLET BY MOUTH ONCE DAILY.    Essential hypertension       LORazepam 0.5 MG tablet    ATIVAN    60 tablet    Take 1 tablet (0.5 mg) by mouth every 6 hours as needed for anxiety    Dysthymic disorder       meclizine 25 MG tablet    ANTIVERT     Take 25 mg by mouth 2 times daily as needed        NITROSTAT 0.4 MG sublingual tablet   Generic drug:  nitroGLYcerin      Place 1 tablet under the tongue every 5 minutes as needed for chest pain        rosuvastatin 20 MG tablet    CRESTOR    90 tablet    Take 1 tablet (20 mg) by mouth daily    Hyperlipidemia, unspecified hyperlipidemia type       valACYclovir 1000 mg tablet    VALTREX    30 tablet    Take 2 tablets (2,000 mg) by mouth 2 times daily For one day, Then take 1 tablet BID for 3 days. May repeat for future outbreaks at onset of lesions.    H/O cold sores

## 2018-11-02 NOTE — NURSING NOTE
"Chief Complaint   Patient presents with     Physical       Initial /80 (BP Location: Right arm, Patient Position: Sitting, Cuff Size: Adult Regular)  Pulse 80  Temp 97.2  F (36.2  C) (Tympanic)  Resp 22  Ht 5' 3.5\" (1.613 m)  Wt 150 lb (68 kg)  Breastfeeding? No  BMI 26.15 kg/m2 Estimated body mass index is 26.15 kg/(m^2) as calculated from the following:    Height as of this encounter: 5' 3.5\" (1.613 m).    Weight as of this encounter: 150 lb (68 kg).  Medication Reconciliation: complete    Kalie Hodges LPN  "

## 2018-11-03 ASSESSMENT — ANXIETY QUESTIONNAIRES: GAD7 TOTAL SCORE: 13

## 2018-11-04 ASSESSMENT — ENCOUNTER SYMPTOMS
ARTHRALGIAS: 1
NERVOUS/ANXIOUS: 1
FEVER: 0
FATIGUE: 0
COUGH: 0

## 2018-11-05 DIAGNOSIS — F34.1 DYSTHYMIC DISORDER: Primary | ICD-10-CM

## 2018-11-07 RX ORDER — DIAZEPAM 2 MG
2 TABLET ORAL
Qty: 10 TABLET | Refills: 0 | OUTPATIENT
Start: 2018-11-07

## 2018-11-07 NOTE — TELEPHONE ENCOUNTER
Filled 11/2/18 #10. Due 11/12/18. Pharmacy alerted. Unable to complete prescription refill per RNMedication Refill Policy.................... Yvonne Esparza ....................  11/7/2018   4:09 PM      diazepam (VALIUM) 2 MG tablet 10 tablet 0 11/2/2018  --   Sig - Route: Take 1 tablet (2 mg) by mouth nightly as needed for anxiety - Oral   Class: Local Print   Order: [id number]

## 2018-12-09 DIAGNOSIS — F34.1 DYSTHYMIC DISORDER: ICD-10-CM

## 2018-12-11 RX ORDER — SERTRALINE HYDROCHLORIDE 100 MG/1
TABLET, FILM COATED ORAL
Qty: 90 TABLET | Refills: 0 | OUTPATIENT
Start: 2018-12-11

## 2018-12-11 NOTE — TELEPHONE ENCOUNTER
Refill request for Zoloft inappropriate. Discontinued at   11/02/18 OV. Pharmacy alerted. Unable to complete prescription refill per RNMedication Refill Policy.................... Yvonne Esparza ....................  12/11/2018   12:46 PM

## 2018-12-26 DIAGNOSIS — F34.1 DYSTHYMIC DISORDER: Primary | ICD-10-CM

## 2018-12-28 RX ORDER — SERTRALINE HYDROCHLORIDE 100 MG/1
100 TABLET, FILM COATED ORAL DAILY
Qty: 90 TABLET | OUTPATIENT
Start: 2018-12-28 | End: 2019-12-28

## 2018-12-28 NOTE — TELEPHONE ENCOUNTER
sertraline (ZOLOFT) 100 MG tablet (Discontinued) 90 tablet 0 9/19/2018 11/2/2018 No   Sig: TAKE 1 TABLET BY MOUTH ONCE DAILY.   Sent to pharmacy as: sertraline (ZOLOFT) 100 MG tablet   Class: E-Prescribe   Order: [id number]   E-Prescribing Status: Receipt confirmed by pharmacy (9/19/2018 12:06 PM CDT)   Printout Tracking     External Result Report   Medication Administration Instructions     TAKE 1 TABLET BY MOUTH ONCE DAILY.   Pharmacy     [id number] - [place], [place] - [facility] [address]   This Order Has Been Discontinued     Order Status Reason By On   Discontinued None Jaycee Ochoa MD 11/2/18 1501     Medication discontinued on 11/2/2018. Pharmacy alerted. Unable to complete prescription refill per RNMedication Refill Policy.................... Yvonne Esparza ....................  12/28/2018   3:42 PM

## 2019-01-16 DIAGNOSIS — I10 ESSENTIAL HYPERTENSION: ICD-10-CM

## 2019-01-16 NOTE — TELEPHONE ENCOUNTER
"Contacted patient and informed that she is due for follow up OV and labs. \" I know that. Please transfer me over to scheduling and I will do that'.     Hydrochlorothiazide  LOV-11/02/2018-Blood pressure is stable.  Meds refilled as above.  She will return to clinic in the near future for labs as above. Dysthymic disorder-Follow-up in approximately 4-6 weeks.    No show for 12/11/2018 OV and lab appointment.     Unable to complete prescription refill per RNMedication Refill Policy.................... Yvonne Esparza ....................  1/16/2019   9:55 AM          "

## 2019-01-17 RX ORDER — HYDROCHLOROTHIAZIDE 25 MG/1
TABLET ORAL
Qty: 90 TABLET | Refills: 0 | Status: SHIPPED | OUTPATIENT
Start: 2019-01-17 | End: 2019-04-11

## 2019-02-11 DIAGNOSIS — R07.89 ATYPICAL CHEST PAIN: Primary | ICD-10-CM

## 2019-02-11 RX ORDER — NITROGLYCERIN 0.4 MG/1
0.4 TABLET SUBLINGUAL EVERY 5 MIN PRN
Qty: 25 TABLET | Refills: 0 | Status: SHIPPED | OUTPATIENT
Start: 2019-02-11 | End: 2020-03-03

## 2019-02-11 NOTE — TELEPHONE ENCOUNTER
Nitroglycerin  Last Written Prescription Date: 02/16/2015  Last Fill Quantity: 30,   # refills: 0  Last Office Visit: 11/02/2018  Future Office visit:    Next 5 appointments (look out 90 days)    Feb 22, 2019  8:15 AM CST  Office Visit with Jaycee Ochoa MD  [facility] ([facility]) [address]  [place] [id number] [phone]           Routing refill request to provider for review/approval.    Unable to complete prescription refill per RNMedication Refill Policy.................... Yvonne Esparza ....................  2/11/2019   3:59 PM

## 2019-04-11 DIAGNOSIS — I10 ESSENTIAL HYPERTENSION: ICD-10-CM

## 2019-04-11 NOTE — LETTER
April 15, 2019      Joana Caicedo  [address] [place] [id number]        Dear Joana,     This is to remind you that you are due for your annual labs in relation to the use of your blood pressure medications. Your last visit with Jaycee Ochoa MD was on 11/02/2018.     Additional refills of your medication require you to complete this visit.    Please call [phone] to schedule your lab only appointment.    Thank you for choosing [facility] for your health care needs.    Sincerely,      Refill RN  [facility]

## 2019-04-15 RX ORDER — HYDROCHLOROTHIAZIDE 25 MG/1
TABLET ORAL
Qty: 90 TABLET | Refills: 1 | Status: SHIPPED | OUTPATIENT
Start: 2019-04-15 | End: 2019-11-23

## 2019-04-15 NOTE — TELEPHONE ENCOUNTER
Patient remains due for labs after multiply notifications. Patient is aware. Refer to 01/16/19 refill. Another reminder letter sent today.     HCTZ  LOV-11/02/2018-Blood pressure is stable.  Meds refilled as above.  She will return to clinic in the near future for labs as above  Last CMP was on 06/19/2017  Future Office visit:      Passed - Blood pressure under 140/90 in past 12 months   BP Readings from Last 3 Encounters:   11/02/18 128/80   09/08/18 130/72   08/29/18 140/80        Routing refill request to provider for review/approval.   Unable to complete prescription refill per RNMedication Refill Policy.................... Yvonne Esparza ....................  4/15/2019   1:26 PM

## 2019-04-17 ENCOUNTER — OFFICE VISIT (OUTPATIENT)
Dept: FAMILY MEDICINE | Facility: OTHER | Age: 54
End: 2019-04-17
Attending: PHYSICIAN ASSISTANT
Payer: COMMERCIAL

## 2019-04-17 VITALS
HEIGHT: 64 IN | DIASTOLIC BLOOD PRESSURE: 68 MMHG | WEIGHT: 151.4 LBS | TEMPERATURE: 97.8 F | BODY MASS INDEX: 25.85 KG/M2 | SYSTOLIC BLOOD PRESSURE: 104 MMHG | RESPIRATION RATE: 16 BRPM | HEART RATE: 80 BPM

## 2019-04-17 DIAGNOSIS — R30.0 DYSURIA: Primary | ICD-10-CM

## 2019-04-17 DIAGNOSIS — R35.0 URINARY FREQUENCY: ICD-10-CM

## 2019-04-17 LAB
ALBUMIN UR-MCNC: NEGATIVE MG/DL
APPEARANCE UR: CLEAR
BACTERIA #/AREA URNS HPF: ABNORMAL /HPF
BILIRUB UR QL STRIP: NEGATIVE
COLOR UR AUTO: YELLOW
GLUCOSE UR STRIP-MCNC: NEGATIVE MG/DL
HGB UR QL STRIP: NEGATIVE
KETONES UR STRIP-MCNC: NEGATIVE MG/DL
LEUKOCYTE ESTERASE UR QL STRIP: ABNORMAL
NITRATE UR QL: NEGATIVE
NON-SQ EPI CELLS #/AREA URNS LPF: ABNORMAL /LPF
PH UR STRIP: 6 PH (ref 5–9)
RBC #/AREA URNS AUTO: ABNORMAL /HPF
SOURCE: ABNORMAL
SP GR UR STRIP: 1.01 (ref 1–1.03)
UROBILINOGEN UR STRIP-ACNC: 0.2 EU/DL (ref 0.2–1)
WBC #/AREA URNS AUTO: ABNORMAL /HPF

## 2019-04-17 PROCEDURE — 87088 URINE BACTERIA CULTURE: CPT | Mod: ZL | Performed by: NURSE PRACTITIONER

## 2019-04-17 PROCEDURE — 99214 OFFICE O/P EST MOD 30 MIN: CPT | Performed by: NURSE PRACTITIONER

## 2019-04-17 PROCEDURE — 87086 URINE CULTURE/COLONY COUNT: CPT | Performed by: NURSE PRACTITIONER

## 2019-04-17 PROCEDURE — 81001 URINALYSIS AUTO W/SCOPE: CPT | Performed by: PHYSICIAN ASSISTANT

## 2019-04-17 RX ORDER — SULFAMETHOXAZOLE/TRIMETHOPRIM 800-160 MG
1 TABLET ORAL 2 TIMES DAILY
Qty: 6 TABLET | Refills: 0 | Status: SHIPPED | OUTPATIENT
Start: 2019-04-17 | End: 2019-04-20

## 2019-04-17 ASSESSMENT — ENCOUNTER SYMPTOMS
FREQUENCY: 1
DYSURIA: 1
FEVER: 0
CHILLS: 1
MUSCULOSKELETAL NEGATIVE: 1
DIFFICULTY URINATING: 0
NEUROLOGICAL NEGATIVE: 1
GASTROINTESTINAL NEGATIVE: 1
FLANK PAIN: 0

## 2019-04-17 ASSESSMENT — PAIN SCALES - GENERAL: PAINLEVEL: SEVERE PAIN (6)

## 2019-04-17 ASSESSMENT — MIFFLIN-ST. JEOR: SCORE: 1263.81

## 2019-04-17 NOTE — NURSING NOTE
Patient presents today for urinary problem. Patient complains of urinary frequency, urgency and burning x4 days.  Medication Reconciliation Complete    Ashly Winn LPN  4/17/2019 12:21 PM

## 2019-04-17 NOTE — PATIENT INSTRUCTIONS
"Push fluids. Take medications as directed.   See PCP if symptoms not completely resolved in 3 days, sooner if symptoms worsen.       Patient Education     Bladder Infection, Female (Adult)    Urine is normally doesn't have any bacteria in it. But bacteria can get into the urinary tract from the skin around the rectum. Or they can travel in the blood from elsewhere in the body. Once they are in your urinary tract, they can cause infection in the urethra (urethritis), the bladder (cystitis), or the kidneys (pyelonephritis).  The most common place for an infection is in the bladder. This is called a bladder infection. This is one of the most common infections in women. Most bladder infections are easily treated. They are not serious unless the infection spreads to the kidney.  The phrases \"bladder infection,\" \"UTI,\" and \"cystitis\" are often used to describe the same thing. But they are not always the same. Cystitis is an inflammation of the bladder. The most common cause of cystitis is an infection.  Symptoms  The infection causes inflammation in the urethra and bladder. This causes many of the symptoms. The most common symptoms of a bladder infection are:    Pain or burning when urinating    Having to urinate more often than usual    Urgent need to urinate    Only a small amount of urine comes out    Blood in urine    Abdominal discomfort. This is usually in the lower abdomen above the pubic bone.    Cloudy urine    Strong- or bad-smelling urine    Unable to urinate (urinary retention)    Unable to hold urine in (urinary incontinence)    Fever    Loss of appetite    Confusion (in older adults)  Causes  Bladder infections are not contagious. You can't get one from someone else, from a toilet seat, or from sharing a bath.  The most common cause of bladder infections is bacteria from the bowels. The bacteria get onto the skin around the opening of the urethra. From there, they can get into the urine and travel up to the " bladder, causing inflammation and infection. This usually happens because of:    Wiping improperly after urinating. Always wipe from front to back.    Bowel incontinence    Pregnancy    Procedures such as having a catheter inserted    Older age    Not emptying your bladder. This can allow bacteria a chance to grow in your urine.    Dehydration    Constipation    Sex    Use of a diaphragm for birth control   Treatment  Bladder infections are diagnosed by a urine test. They are treated with antibiotics and usually clear up quickly without complications. Treatment helps prevent a more serious kidney infection.  Medicines  Medicines can help in the treatment of a bladder infection:    Take antibiotics until they are used up, even if you feel better. It is important to finish them to make sure the infection has cleared.    You can use acetaminophen or ibuprofen for pain, fever, or discomfort, unless another medicine was prescribed. If you have chronic liver or kidney disease, talk with your healthcare provider before using these medicines. Also talk with your provider if you've ever had a stomach ulcer or gastrointestinal bleeding, or are taking blood-thinner medicines.    If you are given phenazopydridine to reduce burning with urination, it will cause your urine to become a bright orange color. This can stain clothing.  Care and prevention  These self-care steps can help prevent future infections:    Drink plenty of fluids to prevent dehydration and flush out your bladder. Do this unless you must restrict fluids for other health reasons, or your doctor told you not to.    Proper cleaning after going to the bathroom is important. Wipe from front to back after using the toilet to prevent the spread of bacteria.    Urinate more often. Don't try to hold urine in for a long time.    Wear loose-fitting clothes and cotton underwear. Avoid tight-fitting pants.    Improve your diet and prevent constipation. Eat more fresh fruit  and vegetables, and fiber, and less junk and fatty foods.    Avoid sex until your symptoms are gone.    Avoid caffeine, alcohol, and spicy foods. These can irritate your bladder.    Urinate right after intercourse to flush out your bladder.    If you use birth control pills and have frequent bladder infections, discuss it with your doctor.  Follow-up care  Call your healthcare provider if all symptoms are not gone after 3 days of treatment. This is especially important if you have repeat infections.  If a culture was done, you will be told if your treatment needs to be changed. If directed, you can call to find out the results.  If X-rays were done, you will be told if the results will affect your treatment.  Call 911  Call 911 if any of the following occur:    Trouble breathing    Hard to wake up or confusion    Fainting or loss of consciousness    Rapid heart rate  When to seek medical advice  Call your healthcare provider right away if any of these occur:    Fever of 100.4 F (38.0 C) or higher, or as directed by your healthcare provider    Symptoms are not better by the third day of treatment    Back or belly (abdominal) pain that gets worse    Repeated vomiting, or unable to keep medicine down    Weakness or dizziness    Vaginal discharge    Pain, redness, or swelling in the outer vaginal area (labia)  Date Last Reviewed: 10/1/2016    [id number] [employer]. [address] [place] [address], [place] [address]. All rights reserved. This information is not intended as a substitute for professional medical care. Always follow your healthcare professional's instructions.

## 2019-04-17 NOTE — PROGRESS NOTES
SUBJECTIVE:   Joana Caicedo is a 54 year old female who presents to clinic today for the following health issues:    HPI  Presents with urgency, frequency and dysuria for 5 days.   Was taking PCN from the dentist and diflucan with no improvement.   Has been taking Azo for symptoms.   Eating and drinking ok. No belly pain, no back pain, no fever.     Patient Active Problem List    Diagnosis Date Noted     Hyperlipidemia 02/09/2018     Priority: Medium     Hypertension 02/09/2018     Priority: Medium     Insomnia 02/09/2018     Priority: Medium     Nicotine addiction 02/09/2018     Priority: Medium     Dysthymic disorder 09/02/2009     Priority: Medium     Past Medical History:   Diagnosis Date     Personal history of other medical treatment (CODE)     Demyelination , Per patient, has had mini strokes     Pure hyperglyceridemia     No Comments Provided      Past Surgical History:   Procedure Laterality Date     APPENDECTOMY OPEN      No Comments Provided     CERCLAGE CERVICAL      x2 with last 2 pregnancies.     LAPAROSCOPIC TUBAL LIGATION      No Comments Provided     Family History   Problem Relation Age of Onset     Heart Disease Father         Heart Disease     Other - See Comments Father          stage III COPD     Heart Disease Mother         Heart Disease     Other - See Comments Mother          kidney disease     Other - See Comments Other         High cholesterol runs in family     Diabetes Sister         Diabetes     Other - See Comments Sister          fibromyalgia, celiac disease.     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  She has  four children ages 22 to about 15 who are all living at home.  She is  from her  and wants to be  but financially cannot afford that.  Her hu    kat is living with the family in an apartment.  He has a girlfriend and Joana has a boyfriend as well.     Current Outpatient Medications   Medication Sig Dispense Refill     albuterol (PROAIR HFA/PROVENTIL HFA/VENTOLIN HFA) 108 (90 Base) MCG/ACT inhaler Inhale 2 puffs into the lungs every 4 hours as needed 1 Inhaler 11     amLODIPine (NORVASC) 5 MG tablet TAKE 1 TABLET BY MOUTH ONCE DAILY. 90 tablet 3     aspirin EC 81 MG EC tablet Take 1 tablet by mouth daily       ATENOLOL PO Take 50 mg by mouth daily       benzonatate (TESSALON) 100 MG capsule Take 1 capsule (100 mg) by mouth 3 times daily as needed for cough 42 capsule 0     citalopram (CELEXA) 20 MG tablet Take 1/2 tablet (10 mg) for 1-2 weeks, then increase to 1 tablet orally daily 90 tablet 3     diazepam (VALIUM) 2 MG tablet Take 1 tablet (2 mg) by mouth nightly as needed for anxiety 10 tablet 0     hydrochlorothiazide (HYDRODIURIL) 25 MG tablet TAKE 1 TABLET BY MOUTH ONCE DAILY. 90 tablet 1     lisinopril (PRINIVIL/ZESTRIL) 20 MG tablet TAKE 1 TABLET BY MOUTH ONCE DAILY. 90 tablet 3     LORazepam (ATIVAN) 0.5 MG tablet Take 1 tablet (0.5 mg) by mouth every 6 hours as needed for anxiety 60 tablet 2     meclizine (ANTIVERT) 25 MG tablet Take 25 mg by mouth 2 times daily as needed       nitroGLYcerin (NITROSTAT) 0.4 MG sublingual tablet Place 1 tablet (0.4 mg) under the tongue every 5 minutes as needed for chest pain 25 tablet 0     rosuvastatin (CRESTOR) 20 MG tablet Take 1 tablet (20 mg) by mouth daily 90 tablet 3     sulfamethoxazole-trimethoprim (BACTRIM DS/SEPTRA DS) 800-160 MG tablet Take 1 tablet by mouth 2 times daily for 3 days 6 tablet 0     valACYclovir (VALTREX) 1000 mg tablet Take 2 tablets (2,000 mg) by mouth 2 times daily For one day, Then take 1 tablet BID for 3 days. May  "repeat for future outbreaks at onset of lesions. 30 tablet 11     Allergies   Allergen Reactions     Amoxicillin Other (See Comments)     Yeast infection       Bupropion      Other reaction(s): Other - Describe In Comment Field  Facial and neck swelling     Niacin      Other reaction(s): Flushing     No Clinical Screening - See Comments Other (See Comments)     Migraine med that starts with a \"V\"  cvholesterol med that starts with a \"N\"     Sumatriptan      Other reaction(s): Other - Describe In Comment Field  \"burning veins\"       Review of Systems   Constitutional: Positive for chills. Negative for fever.   Gastrointestinal: Negative.    Genitourinary: Positive for dysuria, frequency and urgency. Negative for difficulty urinating, flank pain and pelvic pain.   Musculoskeletal: Negative.    Skin: Negative.    Neurological: Negative.         OBJECTIVE:     /68   Pulse 80   Temp 97.8  F (36.6  C) (Tympanic)   Resp 16   Ht 1.613 m (5' 3.5\")   Wt 68.7 kg (151 lb 6.4 oz)   BMI 26.40 kg/m    Body mass index is 26.4 kg/m .  Physical Exam   Constitutional: She is oriented to person, place, and time. She appears well-developed and well-nourished. No distress.   HENT:   Head: Normocephalic and atraumatic.   Right Ear: External ear normal.   Cardiovascular: Normal rate, regular rhythm and normal heart sounds.   Pulmonary/Chest: Effort normal and breath sounds normal.   Abdominal: Soft. Normal appearance and bowel sounds are normal. There is no tenderness. There is no CVA tenderness.   Musculoskeletal: Normal range of motion.   Neurological: She is alert and oriented to person, place, and time.   Skin: Skin is warm and dry. She is not diaphoretic.   Psychiatric: She has a normal mood and affect.   Nursing note and vitals reviewed.      Diagnostic Test Results:  Results for orders placed or performed in visit on 04/17/19 (from the past 24 hour(s))   *UA reflex to Microscopic   Result Value Ref Range    Color Urine " Yellow     Appearance Urine Clear     Glucose Urine Negative NEG^Negative mg/dL    Bilirubin Urine Negative NEG^Negative    Ketones Urine Negative NEG^Negative mg/dL    Specific Gravity Urine 1.010 1.000 - 1.030    Blood Urine Negative NEG^Negative    pH Urine 6.0 5.0 - 9.0 pH    Protein Albumin Urine Negative NEG^Negative mg/dL    Urobilinogen Urine 0.2 0.2 - 1.0 EU/dL    Nitrite Urine Negative NEG^Negative    Leukocyte Esterase Urine Small (A) NEG^Negative    Source Midstream Urine    Urine Microscopic   Result Value Ref Range    WBC Urine 5-10 (A) OTO5^0 - 5 /HPF    RBC Urine O - 2 OTO2^O - 2 /HPF    Squamous Epithelial /LPF Urine Few FEW^Few /LPF    Bacteria Urine Moderate (A) NEG^Negative /HPF       ASSESSMENT/PLAN:       ICD-10-CM    1. Dysuria R30.0 sulfamethoxazole-trimethoprim (BACTRIM DS/SEPTRA DS) 800-160 MG tablet   2. Urinary frequency R35.0 *UA reflex to Microscopic     Urine Microscopic     Urine Culture Aerobic Bacterial       Medical Decision Making:    Differential Diagnosis:  UTI: UTI, Dysuria, Pyelonephritis, Urethritis, Vaginitis and STD    Serious Comorbid Conditions:  Adult:  None    PLAN:  Urine culture ordered. Treating based on progression of symptoms.     UTI Adult:  NO Sulfa Allergy: Septra DS one tablet twice daily for 3 days    Followup:    If not improving or if condition worsens, follow up with your Primary Care Provider    I explained my diagnostic considerations and recommendations to patient who voiced understanding and agreement with the treatment plan. All questions were answered. We discussed potential side effects of any prescribed or recommended therapies, as well as expectations for response to treatments.  Given Epic educational materials.     Disclaimer:  This note consists of words and symbols derived from keyboarding, dictation, or using voice recognition software. As a result, there may be errors in the script that have gone undetected. Please consider this when  interpreting information found in this note.      JOSE Lainez, NP-C  4/17/2019 at 12:31 PM  [facility]

## 2019-04-19 DIAGNOSIS — N30.00 ACUTE CYSTITIS WITHOUT HEMATURIA: Primary | ICD-10-CM

## 2019-04-19 LAB
BACTERIA SPEC CULT: ABNORMAL
SPECIMEN SOURCE: ABNORMAL

## 2019-04-19 RX ORDER — NITROFURANTOIN 25; 75 MG/1; MG/1
100 CAPSULE ORAL 2 TIMES DAILY
Qty: 10 CAPSULE | Refills: 0 | Status: SHIPPED | OUTPATIENT
Start: 2019-04-19 | End: 2019-04-24

## 2019-08-21 ENCOUNTER — OFFICE VISIT (OUTPATIENT)
Dept: FAMILY MEDICINE | Facility: OTHER | Age: 54
End: 2019-08-21
Attending: NURSE PRACTITIONER
Payer: COMMERCIAL

## 2019-08-21 VITALS
OXYGEN SATURATION: 95 % | DIASTOLIC BLOOD PRESSURE: 74 MMHG | SYSTOLIC BLOOD PRESSURE: 112 MMHG | RESPIRATION RATE: 18 BRPM | TEMPERATURE: 97.5 F | WEIGHT: 149.7 LBS | HEART RATE: 59 BPM | HEIGHT: 64 IN | BODY MASS INDEX: 25.56 KG/M2

## 2019-08-21 DIAGNOSIS — N30.90 BLADDER INFECTION: Primary | ICD-10-CM

## 2019-08-21 DIAGNOSIS — R39.15 URINARY URGENCY: ICD-10-CM

## 2019-08-21 LAB
ALBUMIN UR-MCNC: 30 MG/DL
APPEARANCE UR: ABNORMAL
BACTERIA #/AREA URNS HPF: ABNORMAL /HPF
BILIRUB UR QL STRIP: NEGATIVE
COLOR UR AUTO: YELLOW
GLUCOSE UR STRIP-MCNC: NEGATIVE MG/DL
HGB UR QL STRIP: ABNORMAL
KETONES UR STRIP-MCNC: NEGATIVE MG/DL
LEUKOCYTE ESTERASE UR QL STRIP: ABNORMAL
NITRATE UR QL: NEGATIVE
PH UR STRIP: 6.5 PH (ref 5–9)
RBC #/AREA URNS AUTO: ABNORMAL /HPF
SOURCE: ABNORMAL
SP GR UR STRIP: 1.02 (ref 1–1.03)
UROBILINOGEN UR STRIP-ACNC: 0.2 EU/DL (ref 0.2–1)
WBC #/AREA URNS AUTO: ABNORMAL /HPF

## 2019-08-21 PROCEDURE — 99213 OFFICE O/P EST LOW 20 MIN: CPT | Performed by: NURSE PRACTITIONER

## 2019-08-21 PROCEDURE — 87086 URINE CULTURE/COLONY COUNT: CPT | Mod: ZL | Performed by: NURSE PRACTITIONER

## 2019-08-21 PROCEDURE — 81001 URINALYSIS AUTO W/SCOPE: CPT | Mod: ZL,XU | Performed by: NURSE PRACTITIONER

## 2019-08-21 PROCEDURE — 87088 URINE BACTERIA CULTURE: CPT | Mod: ZL | Performed by: NURSE PRACTITIONER

## 2019-08-21 RX ORDER — CIPROFLOXACIN 500 MG/1
500 TABLET, FILM COATED ORAL 2 TIMES DAILY
Qty: 10 TABLET | Refills: 0 | Status: SHIPPED | OUTPATIENT
Start: 2019-08-21 | End: 2019-08-26

## 2019-08-21 RX ORDER — PHENAZOPYRIDINE HYDROCHLORIDE 200 MG/1
200 TABLET, FILM COATED ORAL 3 TIMES DAILY PRN
Qty: 9 TABLET | Refills: 0 | Status: SHIPPED | OUTPATIENT
Start: 2019-08-21 | End: 2019-08-24

## 2019-08-21 ASSESSMENT — ENCOUNTER SYMPTOMS
MUSCULOSKELETAL NEGATIVE: 1
DYSURIA: 1
FREQUENCY: 1
NEUROLOGICAL NEGATIVE: 1
FLANK PAIN: 0
FEVER: 0
GASTROINTESTINAL NEGATIVE: 1

## 2019-08-21 ASSESSMENT — MIFFLIN-ST. JEOR: SCORE: 1264.03

## 2019-08-21 ASSESSMENT — PAIN SCALES - GENERAL: PAINLEVEL: SEVERE PAIN (7)

## 2019-08-21 NOTE — NURSING NOTE
Patient has not been feeling well for 2 days. Their symptoms are possible uti and they are taking AZO at 11:30am.   Rica Charles LPN....................  8/21/2019   6:53 PM

## 2019-08-22 NOTE — PROGRESS NOTES
SUBJECTIVE:   Joana Caicedo is a 54 year old female who presents to clinic today for the following health issues:    HPI  2 day history of urgency, frequency and painful urination. Is going to the bathroom nearly every hour. Has been pushing water but had a couple sodas.  No body aches, no bellyache, no fever.      Patient Active Problem List    Diagnosis Date Noted     Hyperlipidemia 02/09/2018     Priority: Medium     Hypertension 02/09/2018     Priority: Medium     Insomnia 02/09/2018     Priority: Medium     Nicotine addiction 02/09/2018     Priority: Medium     Dysthymic disorder 09/02/2009     Priority: Medium     Past Medical History:   Diagnosis Date     Personal history of other medical treatment (CODE)     Demyelination , Per patient, has had mini strokes     Pure hyperglyceridemia     No Comments Provided      Past Surgical History:   Procedure Laterality Date     APPENDECTOMY OPEN      No Comments Provided     CERCLAGE CERVICAL      x2 with last 2 pregnancies.     LAPAROSCOPIC TUBAL LIGATION      No Comments Provided     Family History   Problem Relation Age of Onset     Heart Disease Father         Heart Disease     Other - See Comments Father          stage III COPD     Heart Disease Mother         Heart Disease     Other - See Comments Mother          kidney disease     Other - See Comments Other         High cholesterol runs in family     Diabetes Sister         Diabetes     Other - See Comments Sister          fibromyalgia, celiac disease.     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  She is  from her  and wants to be  but financially cannot afford that.  Her hu    kat is living with the family in an apartment.  He has a girlfriend and Joana has a boyfriend as well.     Current Outpatient Medications   Medication Sig Dispense Refill     ciprofloxacin (CIPRO) 500 MG tablet Take 1 tablet (500 mg) by mouth 2 times daily for 5 days 10 tablet 0     phenazopyridine (PYRIDIUM) 200 MG tablet Take 1 tablet (200 mg) by mouth 3 times daily as needed for irritation 9 tablet 0     albuterol (PROAIR HFA/PROVENTIL HFA/VENTOLIN HFA) 108 (90 Base) MCG/ACT inhaler Inhale 2 puffs into the lungs every 4 hours as needed 1 Inhaler 11     amLODIPine (NORVASC) 5 MG tablet TAKE 1 TABLET BY MOUTH ONCE DAILY. 90 tablet 3     aspirin EC 81 MG EC tablet Take 1 tablet by mouth daily       ATENOLOL PO Take 50 mg by mouth daily       benzonatate (TESSALON) 100 MG capsule Take 1 capsule (100 mg) by mouth 3 times daily as needed for cough 42 capsule 0     citalopram (CELEXA) 20 MG tablet Take 1/2 tablet (10 mg) for 1-2 weeks, then increase to 1 tablet orally daily 90 tablet 3     diazepam (VALIUM) 2 MG tablet Take 1 tablet (2 mg) by mouth nightly as needed for anxiety 10 tablet 0     hydrochlorothiazide (HYDRODIURIL) 25 MG tablet TAKE 1 TABLET BY MOUTH ONCE DAILY. 90 tablet 1     lisinopril (PRINIVIL/ZESTRIL) 20 MG tablet TAKE 1 TABLET BY MOUTH ONCE DAILY. 90 tablet 3     LORazepam (ATIVAN) 0.5 MG tablet Take 1 tablet (0.5 mg) by mouth every 6 hours as needed for anxiety 60 tablet 2     meclizine (ANTIVERT) 25 MG tablet Take 25 mg by mouth 2 times daily as needed       nitroGLYcerin (NITROSTAT) 0.4 MG sublingual tablet Place 1 tablet (0.4 mg) under the tongue every 5 minutes as needed for chest pain 25 tablet 0     rosuvastatin (CRESTOR) 20 MG tablet Take 1 tablet (20 mg) by mouth daily 90 tablet 3     valACYclovir (VALTREX) 1000 mg tablet Take 2 tablets (2,000 mg) by  "mouth 2 times daily For one day, Then take 1 tablet BID for 3 days. May repeat for future outbreaks at onset of lesions. 30 tablet 11     Allergies   Allergen Reactions     Amoxicillin Other (See Comments)     Yeast infection       Bupropion      Other reaction(s): Other - Describe In Comment Field  Facial and neck swelling     Niacin      Other reaction(s): Flushing     No Clinical Screening - See Comments Other (See Comments)     Migraine med that starts with a \"V\"  cvholesterol med that starts with a \"N\"     Sumatriptan      Other reaction(s): Other - Describe In Comment Field  \"burning veins\"       Review of Systems   Constitutional: Negative for fever.   Gastrointestinal: Negative.    Genitourinary: Positive for dysuria, frequency and urgency. Negative for flank pain.   Musculoskeletal: Negative.    Skin: Negative.    Neurological: Negative.         OBJECTIVE:     /74 (BP Location: Right arm, Patient Position: Sitting, Cuff Size: Adult Regular)   Pulse 59   Temp 97.5  F (36.4  C) (Tympanic)   Resp 18   Ht 1.626 m (5' 4\")   Wt 67.9 kg (149 lb 11.2 oz)   SpO2 95%   Breastfeeding? No   BMI 25.70 kg/m    Body mass index is 25.7 kg/m .  Physical Exam   Constitutional: She is oriented to person, place, and time. She appears well-developed and well-nourished. No distress.   HENT:   Head: Normocephalic and atraumatic.   Eyes: Conjunctivae are normal. No scleral icterus.   Neck: Normal range of motion. Neck supple.   Cardiovascular: Normal rate, regular rhythm and normal heart sounds.   Pulmonary/Chest: Effort normal and breath sounds normal.   Abdominal:   No CVA tenderness.   Musculoskeletal: Normal range of motion.   Lymphadenopathy:     She has no cervical adenopathy.   Neurological: She is alert and oriented to person, place, and time.   Skin: Skin is warm and dry. No rash noted. She is not diaphoretic.   Psychiatric: She has a normal mood and affect. Her behavior is normal.   Nursing note and vitals " reviewed.      Diagnostic Test Results:  Results for orders placed or performed in visit on 08/21/19 (from the past 24 hour(s))   Urinalysis w Reflex Microscopic If Positive   Result Value Ref Range    Color Urine Yellow     Appearance Urine Slightly Cloudy     Glucose Urine Negative NEG^Negative mg/dL    Bilirubin Urine Negative NEG^Negative    Ketones Urine Negative NEG^Negative mg/dL    Specific Gravity Urine 1.025 1.000 - 1.030    Blood Urine Large (A) NEG^Negative    pH Urine 6.5 5.0 - 9.0 pH    Protein Albumin Urine 30 (A) NEG^Negative mg/dL    Urobilinogen Urine 0.2 0.2 - 1.0 EU/dL    Nitrite Urine Negative NEG^Negative    Leukocyte Esterase Urine Moderate (A) NEG^Negative    Source Midstream Urine    Urine Microscopic   Result Value Ref Range    WBC Urine 10-25 (A) OTO5^0 - 5 /HPF    RBC Urine 5-10 (A) OTO2^O - 2 /HPF    Bacteria Urine Many (A) NEG^Negative /HPF       ASSESSMENT/PLAN:       ICD-10-CM    1. Bladder infection N30.90 ciprofloxacin (CIPRO) 500 MG tablet     phenazopyridine (PYRIDIUM) 200 MG tablet     Urine Culture Aerobic Bacterial   2. Urinary urgency R39.15 Urinalysis w Reflex Microscopic If Positive     Urine Microscopic     PLAN:  Patient is afebrile, no acute distress.  Nontoxic-appearing.  UA has large blood, 30 protein, negative nitrates, moderate leukocyte esterase.  She has 10-25 WBC, 5-10 RBCs and many bacteria.  Urine culture is ordered.  We will treat with Cipro and bladder spasm with Pyridium.  Advised push fluids and monitor follow-up with primary care in 2 days if not improving. Given epic education materials.  I explained my diagnostic considerations and recommendations to pt who voiced understanding and agreement with the treatment plan. All questions were answered. We discussed potential side effects of any prescribed or recommended therapies, as well as expectations for response to treatments.    Disclaimer:  This note consists of words and symbols derived from keyboarding,  dictation, or using voice recognition software. As a result, there may be errors in the script that have gone undetected. Please consider this when interpreting information found in this note.      JOSE Lainez, NP-C  8/21/2019 at 7:09 PM  [facility]

## 2019-08-22 NOTE — PATIENT INSTRUCTIONS
"Patient Education     Bladder Infection, Female (Adult)    Urine is normally doesn't have any bacteria in it. But bacteria can get into the urinary tract from the skin around the rectum. Or they can travel in the blood from elsewhere in the body. Once they are in your urinary tract, they can cause infection in the urethra (urethritis), the bladder (cystitis), or the kidneys (pyelonephritis).  The most common place for an infection is in the bladder. This is called a bladder infection. This is one of the most common infections in women. Most bladder infections are easily treated. They are not serious unless the infection spreads to the kidney.  The phrases \"bladder infection,\" \"UTI,\" and \"cystitis\" are often used to describe the same thing. But they are not always the same. Cystitis is an inflammation of the bladder. The most common cause of cystitis is an infection.  Symptoms  The infection causes inflammation in the urethra and bladder. This causes many of the symptoms. The most common symptoms of a bladder infection are:    Pain or burning when urinating    Having to urinate more often than usual    Urgent need to urinate    Only a small amount of urine comes out    Blood in urine    Abdominal discomfort. This is usually in the lower abdomen above the pubic bone.    Cloudy urine    Strong- or bad-smelling urine    Unable to urinate (urinary retention)    Unable to hold urine in (urinary incontinence)    Fever    Loss of appetite    Confusion (in older adults)  Causes  Bladder infections are not contagious. You can't get one from someone else, from a toilet seat, or from sharing a bath.  The most common cause of bladder infections is bacteria from the bowels. The bacteria get onto the skin around the opening of the urethra. From there, they can get into the urine and travel up to the bladder, causing inflammation and infection. This usually happens because of:    Wiping improperly after urinating. Always wipe from " front to back.    Bowel incontinence    Pregnancy    Procedures such as having a catheter inserted    Older age    Not emptying your bladder. This can allow bacteria a chance to grow in your urine.    Dehydration    Constipation    Sex    Use of a diaphragm for birth control   Treatment  Bladder infections are diagnosed by a urine test. They are treated with antibiotics and usually clear up quickly without complications. Treatment helps prevent a more serious kidney infection.  Medicines  Medicines can help in the treatment of a bladder infection:    Take antibiotics until they are used up, even if you feel better. It is important to finish them to make sure the infection has cleared.    You can use acetaminophen or ibuprofen for pain, fever, or discomfort, unless another medicine was prescribed. If you have chronic liver or kidney disease, talk with your healthcare provider before using these medicines. Also talk with your provider if you've ever had a stomach ulcer or gastrointestinal bleeding, or are taking blood-thinner medicines.    If you are given phenazopydridine to reduce burning with urination, it will cause your urine to become a bright orange color. This can stain clothing.  Care and prevention  These self-care steps can help prevent future infections:    Drink plenty of fluids to prevent dehydration and flush out your bladder. Do this unless you must restrict fluids for other health reasons, or your doctor told you not to.    Proper cleaning after going to the bathroom is important. Wipe from front to back after using the toilet to prevent the spread of bacteria.    Urinate more often. Don't try to hold urine in for a long time.    Wear loose-fitting clothes and cotton underwear. Avoid tight-fitting pants.    Improve your diet and prevent constipation. Eat more fresh fruit and vegetables, and fiber, and less junk and fatty foods.    Avoid sex until your symptoms are gone.    Avoid caffeine, alcohol, and  spicy foods. These can irritate your bladder.    Urinate right after intercourse to flush out your bladder.    If you use birth control pills and have frequent bladder infections, discuss it with your doctor.  Follow-up care  Call your healthcare provider if all symptoms are not gone after 3 days of treatment. This is especially important if you have repeat infections.  If a culture was done, you will be told if your treatment needs to be changed. If directed, you can call to find out the results.  If X-rays were done, you will be told if the results will affect your treatment.  Call 911  Call 911 if any of the following occur:    Trouble breathing    Hard to wake up or confusion    Fainting or loss of consciousness    Rapid heart rate  When to seek medical advice  Call your healthcare provider right away if any of these occur:    Fever of 100.4 F (38.0 C) or higher, or as directed by your healthcare provider    Symptoms are not better by the third day of treatment    Back or belly (abdominal) pain that gets worse    Repeated vomiting, or unable to keep medicine down    Weakness or dizziness    Vaginal discharge    Pain, redness, or swelling in the outer vaginal area (labia)  Date Last Reviewed: 10/1/2016    8584-1564 [employer]. [address] [place] [address], [place] [address]. All rights reserved. This information is not intended as a substitute for professional medical care. Always follow your healthcare professional's instructions.

## 2019-08-23 LAB
BACTERIA SPEC CULT: ABNORMAL
SPECIMEN SOURCE: ABNORMAL

## 2019-10-18 DIAGNOSIS — F34.1 DYSTHYMIC DISORDER: ICD-10-CM

## 2019-10-18 RX ORDER — DIAZEPAM 2 MG
2 TABLET ORAL
Qty: 10 TABLET | Refills: 0 | OUTPATIENT
Start: 2019-10-18

## 2019-10-18 RX ORDER — LORAZEPAM 0.5 MG/1
0.5 TABLET ORAL EVERY 6 HOURS PRN
Qty: 30 TABLET | Refills: 0 | Status: SHIPPED | OUTPATIENT
Start: 2019-10-18 | End: 2019-12-05

## 2019-10-18 NOTE — TELEPHONE ENCOUNTER
Ativan   Last Written Prescription Date:  11/2/2018  Last Fill Quantity: 60,   # refills: 2    Valium    Last Written Prescription Date: 11/2/2018  Last Fill Quantity: 10,   # refills: 0  Last Office Visit: 11/02/2018  Future Office visit:       Routing refill request to provider for review/approval because:  Drug not on the [facility], [facility] or [facility] refill protocol or controlled substance.     Unable to complete prescription refill per RNMedication Refill Policy.................... Yvonne Esparza RN ....................  10/18/2019   12:23 PM

## 2019-11-11 DIAGNOSIS — Z86.19 H/O COLD SORES: ICD-10-CM

## 2019-11-11 NOTE — LETTER
November 14, 2019      Joana Caicedo  [address] [place] [id number]      Dear Joana,     This is to remind you that you are due for your annual office visit with Jaycee Ochoa MD. Your last visit was on 11/02/2018.    Additional refills of your medication require you to complete this visit.    Please call [phone] to schedule your appointment.    Thank you for choosing [facility] for your health care needs.    Sincerely,      Refill RN  [facility]

## 2019-11-14 RX ORDER — VALACYCLOVIR HYDROCHLORIDE 1 G/1
TABLET, FILM COATED ORAL
Qty: 30 TABLET | Refills: 11 | Status: SHIPPED | OUTPATIENT
Start: 2019-11-14 | End: 2020-03-03

## 2019-11-14 NOTE — TELEPHONE ENCOUNTER
ValACYclovir (VALTREX) 1000 mg tablet  Last Written Prescription Date: 11/02/2018  Last Fill Quantity: 30,   # refills: 11  Last Office Visit: 11/02/2018. Due for OV. Reminder letter sent.   Future Office visit:       Routing refill request to provider for review/approval because:  Due for annual labs per protocol. Last CMP was on 06/19/2017    Unable to complete prescription refill per RNMedication Refill Policy.................... Yvonne Esparza RN ....................  11/14/2019   3:30 PM

## 2019-11-23 DIAGNOSIS — I10 ESSENTIAL HYPERTENSION: ICD-10-CM

## 2019-11-27 RX ORDER — AMLODIPINE BESYLATE 5 MG/1
TABLET ORAL
Qty: 90 TABLET | Refills: 0 | Status: SHIPPED | OUTPATIENT
Start: 2019-11-27 | End: 2020-03-03

## 2019-11-27 RX ORDER — LISINOPRIL 20 MG/1
TABLET ORAL
Qty: 90 TABLET | Refills: 0 | Status: SHIPPED | OUTPATIENT
Start: 2019-11-27 | End: 2020-03-03

## 2019-11-27 RX ORDER — HYDROCHLOROTHIAZIDE 25 MG/1
TABLET ORAL
Qty: 90 TABLET | Refills: 0 | Status: SHIPPED | OUTPATIENT
Start: 2019-11-27 | End: 2020-03-03

## 2019-11-27 NOTE — TELEPHONE ENCOUNTER
Patient notified. She has a physical scheduled in January.  Marianela Fermin LPN, LPN  11/27/2019  12:00 PM

## 2019-11-27 NOTE — TELEPHONE ENCOUNTER
Refill medications for 3 months.  Needs to be seen for recheck prior to future refills.  Brandi Marrero PA-C.......... 11/27/2019 11:24 AM

## 2019-11-27 NOTE — TELEPHONE ENCOUNTER
Routing refill request to provider for review/approval because:   Normal serum creatinine on file in past 12 months    Normal serum potassium on file in past 12 months    Normal serum sodium on file in past 12 months     LOV 11/2/18  Patient noted to have appointment with Jaycee Ochoa on 1/16/2020  Will route to covering team let for consideration of reffill  Rehana Hall RN on 11/27/2019 at 10:06 AM

## 2019-12-04 DIAGNOSIS — I10 ESSENTIAL HYPERTENSION: ICD-10-CM

## 2019-12-05 DIAGNOSIS — F34.1 DYSTHYMIC DISORDER: ICD-10-CM

## 2019-12-05 RX ORDER — LISINOPRIL 20 MG/1
TABLET ORAL
Qty: 90 TABLET | Refills: 0 | OUTPATIENT
Start: 2019-12-05

## 2019-12-05 RX ORDER — AMLODIPINE BESYLATE 5 MG/1
TABLET ORAL
Qty: 90 TABLET | Refills: 0 | OUTPATIENT
Start: 2019-12-05

## 2019-12-05 RX ORDER — HYDROCHLOROTHIAZIDE 25 MG/1
TABLET ORAL
Qty: 90 TABLET | Refills: 0 | OUTPATIENT
Start: 2019-12-05

## 2019-12-05 NOTE — TELEPHONE ENCOUNTER
Filled 11/27/19 #90. Pharmacy alerted. Unable to complete prescription refill per RNMedication Refill Policy.................... Yvonne Esparza RN ....................  12/5/2019   2:39 PM      amLODIPine (NORVASC) 5 MG tablet 90 tablet 0 11/27/2019  No   Sig: TAKE 1 TABLET BY MOUTH ONCE DAILY.   Sent to pharmacy as: amLODIPine (NORVASC) 5 MG tablet   Class: E-Prescribe   Notes to Pharmacy: Patient enrolled in our Rx Med Sync service to improveadherence. We are requesting a refill authorization inadvance to ensure an active prescription is on file.   Order: [id number]   E-Prescribing Status: Receipt confirmed by pharmacy (11/27/2019 11:25 AM CST)     hydrochlorothiazide (HYDRODIURIL) 25 MG tablet 90 tablet 0 11/27/2019  No   Sig: TAKE 1 TABLET BY MOUTH ONCE DAILY.   Sent to pharmacy as: hydrochlorothiazide (HYDRODIURIL) 25 MG tablet   Class: E-Prescribe   Notes to Pharmacy: Patient enrolled in our Rx Med Sync service to improveadherence. We are requesting a refill authorization inadvance to ensure an active prescription is on file.   Order: [id number]   E-Prescribing Status: Receipt confirmed by pharmacy (11/27/2019 11:25 AM CST)     lisinopril (PRINIVIL/ZESTRIL) 20 MG tablet 90 tablet 0 11/27/2019  No   Sig: TAKE 1 TABLET BY MOUTH ONCE DAILY.   Sent to pharmacy as: lisinopril (PRINIVIL/ZESTRIL) 20 MG tablet   Class: E-Prescribe   Notes to Pharmacy: Patient enrolled in our Rx Med Sync service to improveadherence. We are requesting a refill authorization inadvance to ensure an active prescription is on file.   Order: [id number]   E-Prescribing Status: Receipt confirmed by pharmacy (11/27/2019 11:25 AM CST)       [id number] - [place], [place] - [address]

## 2019-12-09 RX ORDER — LORAZEPAM 0.5 MG/1
TABLET ORAL
Qty: 30 TABLET | Refills: 0 | Status: SHIPPED | OUTPATIENT
Start: 2019-12-09 | End: 2020-01-20

## 2019-12-09 RX ORDER — CITALOPRAM HYDROBROMIDE 20 MG/1
TABLET ORAL
Qty: 90 TABLET | Refills: 0 | Status: SHIPPED | OUTPATIENT
Start: 2019-12-09 | End: 2020-03-03

## 2019-12-09 NOTE — TELEPHONE ENCOUNTER
Routing refill request to provider for review/approval because:  Drug not on the [facility] refill protocol   Labs out of range:  PHQ9  Labs not current:  PHQ9    LOV: 11/2/18    Patient noted to have appointment scheduled for 1/16/20 with Jaycee Ochoa RN on 12/9/2019 at 10:56 AM

## 2020-01-17 DIAGNOSIS — F34.1 DYSTHYMIC DISORDER: ICD-10-CM

## 2020-01-20 RX ORDER — LORAZEPAM 0.5 MG/1
TABLET ORAL
Qty: 30 TABLET | Refills: 0 | Status: SHIPPED | OUTPATIENT
Start: 2020-01-20 | End: 2020-03-03

## 2020-01-20 NOTE — TELEPHONE ENCOUNTER
Routing refill request to provider for review/approval because:  Drug not on the [facility] refill protocol     LOV: 11/2/2018  Appointment noted for 3/3/2020    Rehana Hall RN on 1/20/2020 at 8:09 AM

## 2020-01-25 DIAGNOSIS — E78.5 HYPERLIPIDEMIA, UNSPECIFIED HYPERLIPIDEMIA TYPE: ICD-10-CM

## 2020-01-27 RX ORDER — ROSUVASTATIN CALCIUM 20 MG/1
20 TABLET, COATED ORAL DAILY
Qty: 90 TABLET | Refills: 0 | Status: SHIPPED | OUTPATIENT
Start: 2020-01-27 | End: 2020-03-03

## 2020-01-27 NOTE — TELEPHONE ENCOUNTER
Rosuvastatin (CRESTOR) 20 MG tablet  Last Written Prescription Date: 11/02/2018  Last Fill Quantity: 90,   # refills: 3  Last Office Visit: 11/02/2018  Future Office visit:    Next 5 appointments (look out 90 days)    Mar 03, 2020  9:00 AM CST  PHYSICAL with Jaycee Ochoa MD  [facility] ([facility]) [address]  [place] [address]  [phone]           Routing refill request to provider for review/approval because:  Last Lipid panel 06/19/2017    Unable to complete prescription refill per RNMedication Refill Policy.................... Yvonne Esparza RN ....................  1/27/2020   4:18 PM

## 2020-03-03 ENCOUNTER — OFFICE VISIT (OUTPATIENT)
Dept: FAMILY MEDICINE | Facility: OTHER | Age: 55
End: 2020-03-03
Attending: FAMILY MEDICINE
Payer: COMMERCIAL

## 2020-03-03 VITALS
SYSTOLIC BLOOD PRESSURE: 104 MMHG | TEMPERATURE: 97.8 F | HEIGHT: 64 IN | WEIGHT: 150 LBS | HEART RATE: 80 BPM | RESPIRATION RATE: 16 BRPM | DIASTOLIC BLOOD PRESSURE: 62 MMHG | BODY MASS INDEX: 25.61 KG/M2 | OXYGEN SATURATION: 98 %

## 2020-03-03 DIAGNOSIS — Z00.00 HEALTH CARE MAINTENANCE: ICD-10-CM

## 2020-03-03 DIAGNOSIS — F34.1 DYSTHYMIC DISORDER: ICD-10-CM

## 2020-03-03 DIAGNOSIS — Z86.19 H/O COLD SORES: ICD-10-CM

## 2020-03-03 DIAGNOSIS — R07.89 ATYPICAL CHEST PAIN: ICD-10-CM

## 2020-03-03 DIAGNOSIS — Z13.29 SCREENING FOR THYROID DISORDER: ICD-10-CM

## 2020-03-03 DIAGNOSIS — I10 ESSENTIAL HYPERTENSION: Primary | ICD-10-CM

## 2020-03-03 DIAGNOSIS — E55.9 VITAMIN D DEFICIENCY: ICD-10-CM

## 2020-03-03 DIAGNOSIS — Z13.0 SCREENING FOR DEFICIENCY ANEMIA: ICD-10-CM

## 2020-03-03 DIAGNOSIS — Z13.1 SCREENING FOR DIABETES MELLITUS: ICD-10-CM

## 2020-03-03 DIAGNOSIS — J98.9 REACTIVE AIRWAY DISEASE THAT IS NOT ASTHMA: ICD-10-CM

## 2020-03-03 DIAGNOSIS — F41.9 ANXIETY: ICD-10-CM

## 2020-03-03 DIAGNOSIS — R42 DIZZINESS: ICD-10-CM

## 2020-03-03 DIAGNOSIS — E78.5 HYPERLIPIDEMIA, UNSPECIFIED HYPERLIPIDEMIA TYPE: ICD-10-CM

## 2020-03-03 LAB
ALBUMIN SERPL-MCNC: 4.3 G/DL (ref 3.5–5.7)
ALP SERPL-CCNC: 97 U/L (ref 34–104)
ALT SERPL W P-5'-P-CCNC: 18 U/L (ref 7–52)
ANION GAP SERPL CALCULATED.3IONS-SCNC: 10 MMOL/L (ref 3–14)
AST SERPL W P-5'-P-CCNC: 12 U/L (ref 13–39)
BASOPHILS # BLD AUTO: 0.1 10E9/L (ref 0–0.2)
BASOPHILS NFR BLD AUTO: 0.9 %
BILIRUB SERPL-MCNC: 0.6 MG/DL (ref 0.3–1)
BUN SERPL-MCNC: 5 MG/DL (ref 7–25)
CALCIUM SERPL-MCNC: 9.7 MG/DL (ref 8.6–10.3)
CHLORIDE SERPL-SCNC: 99 MMOL/L (ref 98–107)
CHOLEST SERPL-MCNC: 283 MG/DL
CO2 SERPL-SCNC: 28 MMOL/L (ref 21–31)
CREAT SERPL-MCNC: 0.65 MG/DL (ref 0.6–1.2)
DEPRECATED CALCIDIOL+CALCIFEROL SERPL-MC: 14.9 NG/ML
DIFFERENTIAL METHOD BLD: ABNORMAL
EOSINOPHIL # BLD AUTO: 0.1 10E9/L (ref 0–0.7)
EOSINOPHIL NFR BLD AUTO: 0.9 %
ERYTHROCYTE [DISTWIDTH] IN BLOOD BY AUTOMATED COUNT: 12.2 % (ref 10–15)
GFR SERPL CREATININE-BSD FRML MDRD: >90 ML/MIN/{1.73_M2}
GLUCOSE SERPL-MCNC: 96 MG/DL (ref 70–105)
HCT VFR BLD AUTO: 43.6 % (ref 35–47)
HDLC SERPL-MCNC: 27 MG/DL (ref 23–92)
HGB BLD-MCNC: 15 G/DL (ref 11.7–15.7)
IMM GRANULOCYTES # BLD: 0.1 10E9/L (ref 0–0.4)
IMM GRANULOCYTES NFR BLD: 0.4 %
LDLC SERPL CALC-MCNC: ABNORMAL MG/DL
LYMPHOCYTES # BLD AUTO: 1.7 10E9/L (ref 0.8–5.3)
LYMPHOCYTES NFR BLD AUTO: 15 %
MCH RBC QN AUTO: 30.8 PG (ref 26.5–33)
MCHC RBC AUTO-ENTMCNC: 34.4 G/DL (ref 31.5–36.5)
MCV RBC AUTO: 90 FL (ref 78–100)
MONOCYTES # BLD AUTO: 0.6 10E9/L (ref 0–1.3)
MONOCYTES NFR BLD AUTO: 5 %
NEUTROPHILS # BLD AUTO: 9 10E9/L (ref 1.6–8.3)
NEUTROPHILS NFR BLD AUTO: 77.8 %
NONHDLC SERPL-MCNC: 256 MG/DL
PLATELET # BLD AUTO: 297 10E9/L (ref 150–450)
POTASSIUM SERPL-SCNC: 3.3 MMOL/L (ref 3.5–5.1)
PROT SERPL-MCNC: 7.2 G/DL (ref 6.4–8.9)
RBC # BLD AUTO: 4.87 10E12/L (ref 3.8–5.2)
SODIUM SERPL-SCNC: 137 MMOL/L (ref 134–144)
TRIGL SERPL-MCNC: 1125 MG/DL
TSH SERPL DL<=0.05 MIU/L-ACNC: 1.48 IU/ML (ref 0.34–5.6)
WBC # BLD AUTO: 11.5 10E9/L (ref 4–11)

## 2020-03-03 PROCEDURE — 80053 COMPREHEN METABOLIC PANEL: CPT | Mod: ZL | Performed by: FAMILY MEDICINE

## 2020-03-03 PROCEDURE — 85025 COMPLETE CBC W/AUTO DIFF WBC: CPT | Mod: ZL | Performed by: FAMILY MEDICINE

## 2020-03-03 PROCEDURE — 36415 COLL VENOUS BLD VENIPUNCTURE: CPT | Mod: ZL | Performed by: FAMILY MEDICINE

## 2020-03-03 PROCEDURE — 99396 PREV VISIT EST AGE 40-64: CPT | Performed by: FAMILY MEDICINE

## 2020-03-03 PROCEDURE — 82306 VITAMIN D 25 HYDROXY: CPT | Mod: ZL | Performed by: FAMILY MEDICINE

## 2020-03-03 PROCEDURE — 84443 ASSAY THYROID STIM HORMONE: CPT | Mod: ZL | Performed by: FAMILY MEDICINE

## 2020-03-03 PROCEDURE — 80061 LIPID PANEL: CPT | Mod: ZL | Performed by: FAMILY MEDICINE

## 2020-03-03 RX ORDER — ALBUTEROL SULFATE 90 UG/1
2 AEROSOL, METERED RESPIRATORY (INHALATION) EVERY 4 HOURS PRN
Qty: 1 INHALER | Refills: 11 | Status: SHIPPED | OUTPATIENT
Start: 2020-03-03 | End: 2021-06-03

## 2020-03-03 RX ORDER — NITROGLYCERIN 0.4 MG/1
0.4 TABLET SUBLINGUAL EVERY 5 MIN PRN
Qty: 25 TABLET | Refills: 0 | Status: SHIPPED | OUTPATIENT
Start: 2020-03-03 | End: 2021-06-03

## 2020-03-03 RX ORDER — MECLIZINE HYDROCHLORIDE 25 MG/1
25 TABLET ORAL 3 TIMES DAILY PRN
Qty: 90 TABLET | Refills: 11 | Status: SHIPPED | OUTPATIENT
Start: 2020-03-03 | End: 2021-03-23

## 2020-03-03 RX ORDER — ROSUVASTATIN CALCIUM 20 MG/1
20 TABLET, COATED ORAL DAILY
Qty: 90 TABLET | Refills: 3 | Status: SHIPPED | OUTPATIENT
Start: 2020-03-03 | End: 2021-03-23

## 2020-03-03 RX ORDER — HYDROXYZINE PAMOATE 50 MG/1
50 CAPSULE ORAL 3 TIMES DAILY PRN
Qty: 90 CAPSULE | Refills: 11 | Status: SHIPPED | OUTPATIENT
Start: 2020-03-03 | End: 2021-06-03

## 2020-03-03 RX ORDER — LORAZEPAM 0.5 MG/1
0.5 TABLET ORAL EVERY 6 HOURS
Qty: 30 TABLET | Refills: 0 | Status: SHIPPED | OUTPATIENT
Start: 2020-03-03 | End: 2020-11-27

## 2020-03-03 RX ORDER — CITALOPRAM HYDROBROMIDE 40 MG/1
40 TABLET ORAL DAILY
Qty: 90 TABLET | Refills: 3 | Status: SHIPPED | OUTPATIENT
Start: 2020-03-03 | End: 2021-02-09

## 2020-03-03 RX ORDER — AMLODIPINE BESYLATE 5 MG/1
5 TABLET ORAL DAILY
Qty: 90 TABLET | Refills: 3 | Status: SHIPPED | OUTPATIENT
Start: 2020-03-03 | End: 2021-04-30

## 2020-03-03 RX ORDER — HYDROCHLOROTHIAZIDE 25 MG/1
25 TABLET ORAL DAILY
Qty: 90 TABLET | Refills: 3 | Status: SHIPPED | OUTPATIENT
Start: 2020-03-03 | End: 2021-04-30

## 2020-03-03 RX ORDER — DIAZEPAM 2 MG
2 TABLET ORAL
Qty: 10 TABLET | Refills: 0 | Status: SHIPPED | OUTPATIENT
Start: 2020-03-03 | End: 2020-08-17

## 2020-03-03 RX ORDER — LISINOPRIL 20 MG/1
20 TABLET ORAL DAILY
Qty: 90 TABLET | Refills: 3 | Status: SHIPPED | OUTPATIENT
Start: 2020-03-03 | End: 2021-03-23

## 2020-03-03 RX ORDER — VALACYCLOVIR HYDROCHLORIDE 1 G/1
TABLET, FILM COATED ORAL
Qty: 30 TABLET | Refills: 11 | Status: SHIPPED | OUTPATIENT
Start: 2020-03-03 | End: 2022-01-24

## 2020-03-03 ASSESSMENT — PAIN SCALES - GENERAL: PAINLEVEL: NO PAIN (0)

## 2020-03-03 ASSESSMENT — MIFFLIN-ST. JEOR: SCORE: 1261.91

## 2020-03-03 NOTE — PROGRESS NOTES
"  SUBJECTIVE:   Nursing Notes:   Neida Hughes LPN  3/3/2020  9:06 AM  Signed  Patient presents to the clinic today for a physical, labs and medication refill.  Neida Hughes LPN 3/3/2020   9:00 AM    Chief Complaint   Patient presents with     Physical     Refill Request       Initial /62 (BP Location: Right arm, Patient Position: Sitting, Cuff Size: Adult Regular)   Pulse 80   Temp 97.8  F (36.6  C) (Tympanic)   Resp 16   Ht 1.62 m (5' 3.78\")   Wt 68 kg (150 lb)   LMP  (LMP Unknown)   SpO2 98%   Breastfeeding No   BMI 25.93 kg/m    Estimated body mass index is 25.93 kg/m  as calculated from the following:    Height as of this encounter: 1.62 m (5' 3.78\").    Weight as of this encounter: 68 kg (150 lb).  Medication Reconciliation: complete  Neida Hughes LPN      Joana Caicedo is a 54 year old female who presents to clinic today for medication refill.      She declines mammogram.  Had a lot of nausea/vomiting due to the pain of her mammogram last time.      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      Off of crestor for the past couple of weeks.    Anxiety and depression are worse lately.  Having problems sleeping.  She is working some nights as well as days.  She is on celexa 20 mg daily.  Would like a small number of valium to have to use for sleep if needed and a few lorazepam to use for daytime anxiety/panic symptoms if needed.    HPI    I personally reviewed medications/allergies/history listed below:    Patient Active Problem List    Diagnosis Date Noted     Hyperlipidemia 02/09/2018     Priority: Medium     Hypertension 02/09/2018     Priority: Medium     Insomnia 02/09/2018     Priority: Medium     Nicotine addiction 02/09/2018     Priority: Medium     Dysthymic disorder 09/02/2009     Priority: Medium     Past Medical History:   Diagnosis Date     Personal history of other medical treatment (CODE)     Demyelination , Per patient, " has had mini strokes     Pure hyperglyceridemia     No Comments Provided      Past Surgical History:   Procedure Laterality Date     APPENDECTOMY OPEN      No Comments Provided     CERCLAGE CERVICAL      x2 with last 2 pregnancies.     LAPAROSCOPIC TUBAL LIGATION      No Comments Provided     Family History   Problem Relation Age of Onset     Heart Disease Father         Heart Disease     Other - See Comments Father          stage III COPD     Heart Disease Mother         Heart Disease     Other - See Comments Mother          kidney disease     Other - See Comments Other         High cholesterol runs in family     Diabetes Sister         Diabetes     Other - See Comments Sister          fibromyalgia, celiac disease.     Social History     Tobacco Use     Smoking status: Current Every Day Smoker     Packs/day: 1.00     Years: 31.00     Pack years: 31.00     Types: Cigarettes     Smokeless tobacco: Never Used   Substance Use Topics     Alcohol use: Yes     Comment: Alcoholic Drinks/day: 2 per month     Social History     Social History Narrative    Patient is a nursing assistant, currently working as a PCA.  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  She is  from her  and wants to be  but financially cannot afford that.  Her  is living with the family in an apartment.  He has a girlfriend and Joana has a boyfriend as well.     Current Outpatient Medications   Medication Sig Dispense Refill     albuterol (PROAIR HFA/PROVENTIL HFA/VENTOLIN HFA) 108 (90 Base) MCG/ACT inhaler Inhale 2 puffs into the lungs every 4 hours as needed for shortness of breath / dyspnea 1 Inhaler 11     amLODIPine (NORVASC) 5 MG tablet Take 1 tablet (5 mg) by mouth daily 90 tablet 3     aspirin EC 81 MG EC tablet Take 1 tablet by mouth daily       ATENOLOL PO Take 50 mg by mouth daily       citalopram (CELEXA) 40 MG tablet Take 1 tablet (40 mg) by mouth daily TAKE  "1/2 TABLET BY MOUTH FOR 1-2 WEEKS,THEN INCREASE TO 1 TABLET DAILY 90 tablet 3     diazepam (VALIUM) 2 MG tablet Take 1 tablet (2 mg) by mouth nightly as needed for anxiety 10 tablet 0     hydrochlorothiazide (HYDRODIURIL) 25 MG tablet Take 1 tablet (25 mg) by mouth daily 90 tablet 3     hydrOXYzine (VISTARIL) 50 MG capsule Take 1 capsule (50 mg) by mouth 3 times daily as needed for anxiety 90 capsule 11     lisinopril (ZESTRIL) 20 MG tablet Take 1 tablet (20 mg) by mouth daily 90 tablet 3     LORazepam (ATIVAN) 0.5 MG tablet Take 1 tablet (0.5 mg) by mouth every 6 hours 30 tablet 0     meclizine (ANTIVERT) 25 MG tablet Take 1 tablet (25 mg) by mouth 3 times daily as needed for dizziness 90 tablet 11     meclizine (ANTIVERT) 25 MG tablet Take 25 mg by mouth 2 times daily as needed       nitroGLYcerin (NITROSTAT) 0.4 MG sublingual tablet Place 1 tablet (0.4 mg) under the tongue every 5 minutes as needed for chest pain 25 tablet 0     rosuvastatin (CRESTOR) 20 MG tablet Take 1 tablet (20 mg) by mouth daily 90 tablet 3     valACYclovir (VALTREX) 1000 mg tablet TAKE 2 TABLETS BY MOUTH 2 TIMES DAILY FOR ONE DAY, THEN TAKE 1 TABLET TWICE DAILY FOR 3 DAYS. MAY REPEAT FOR FUTURE OUTBREAKS RE OUTBREA 30 tablet 11     Allergies   Allergen Reactions     Amoxicillin Other (See Comments)     Yeast infection       Bupropion      Other reaction(s): Other - Describe In Comment Field  Facial and neck swelling     Niacin      Other reaction(s): Flushing     No Clinical Screening - See Comments Other (See Comments)     Migraine med that starts with a \"V\"  cvholesterol med that starts with a \"N\"     Sumatriptan      Other reaction(s): Other - Describe In Comment Field  \"burning veins\"       Review of Systems   Constitutional: Negative for chills and fever.   Respiratory: Negative for cough.    Cardiovascular: Negative for peripheral edema.   Psychiatric/Behavioral: Positive for mood changes. The patient is nervous/anxious.     " "    OBJECTIVE:     /62 (BP Location: Right arm, Patient Position: Sitting, Cuff Size: Adult Regular)   Pulse 80   Temp 97.8  F (36.6  C) (Tympanic)   Resp 16   Ht 1.62 m (5' 3.78\")   Wt 68 kg (150 lb)   LMP  (LMP Unknown)   SpO2 98%   Breastfeeding No   BMI 25.93 kg/m    Body mass index is 25.93 kg/m .  Physical Exam  Constitutional:       Appearance: She is well-developed.   HENT:      Head: Normocephalic.      Right Ear: External ear normal.      Left Ear: External ear normal.      Nose: Nose normal.   Eyes:      Pupils: Pupils are equal, round, and reactive to light.   Neck:      Musculoskeletal: Normal range of motion and neck supple.      Thyroid: No thyromegaly.   Cardiovascular:      Rate and Rhythm: Normal rate and regular rhythm.      Heart sounds: Normal heart sounds. No murmur.   Pulmonary:      Effort: Pulmonary effort is normal. No respiratory distress.      Breath sounds: Normal breath sounds. No wheezing or rales.      Comments: Declines breast exam.  Chest:      Chest wall: No tenderness.   Abdominal:      General: Bowel sounds are normal. There is no distension.      Palpations: Abdomen is soft. There is no mass.      Tenderness: There is no abdominal tenderness. There is no guarding or rebound.   Genitourinary:     Comments: Declines pelvic exam.  Musculoskeletal:         General: No tenderness or deformity.   Lymphadenopathy:      Cervical: No cervical adenopathy.   Skin:     General: Skin is warm and dry.   Neurological:      Mental Status: She is alert and oriented to person, place, and time.      Cranial Nerves: No cranial nerve deficit.      Coordination: Coordination normal.           PHQ-9 SCORE 9/16/2016 6/19/2017 11/2/2018   PHQ-9 Total Score 6 8 13       PHQ-2 Score:     PHQ-2 ( 1999 [employer]) 8/21/2019 4/17/2019   Q1: Little interest or pleasure in doing things 0 0   Q2: Feeling down, depressed or hopeless 0 0   PHQ-2 Score 0 0       MAUDE-7 SCORE 9/16/2016 6/19/2017 11/2/2018 "   Total Score 5 7 13         No flowsheet data found.      I personally reviewed results withpatient as listed below:   Diagnostic Test Results:  none     ASSESSMENT/PLAN:       ICD-10-CM    1. Essential hypertension I10 lisinopril (ZESTRIL) 20 MG tablet     hydrochlorothiazide (HYDRODIURIL) 25 MG tablet     amLODIPine (NORVASC) 5 MG tablet     Comprehensive Metabolic Panel     Comprehensive Metabolic Panel   2. Hyperlipidemia, unspecified hyperlipidemia type E78.5 rosuvastatin (CRESTOR) 20 MG tablet     Comprehensive Metabolic Panel     Lipid Panel     Lipid Panel     Comprehensive Metabolic Panel   3. Anxiety F41.9 hydrOXYzine (VISTARIL) 50 MG capsule     Thyrotropin GH     Vitamin D Total     LORazepam (ATIVAN) 0.5 MG tablet     Vitamin D Total     Thyrotropin GH   4. Dysthymic disorder F34.1 diazepam (VALIUM) 2 MG tablet     citalopram (CELEXA) 40 MG tablet   5. Reactive airway disease that is not asthma R09.89 albuterol (PROAIR HFA/PROVENTIL HFA/VENTOLIN HFA) 108 (90 Base) MCG/ACT inhaler   6. H/O cold sores Z86.19 valACYclovir (VALTREX) 1000 mg tablet   7. Atypical chest pain R07.89 nitroGLYcerin (NITROSTAT) 0.4 MG sublingual tablet     CBC and Differential     CBC and Differential   8. Dizziness R42 meclizine (ANTIVERT) 25 MG tablet   9. Screening for diabetes mellitus Z13.1 Comprehensive Metabolic Panel     Comprehensive Metabolic Panel   10. Screening for thyroid disorder Z13.29 Thyrotropin GH     Thyrotropin GH   11. Screening for deficiency anemia Z13.0 CBC and Differential     CBC and Differential   12. Health care maintenance Z00.00        1.  Blood pressure is stable.  Labs completed today as above.  Refills as above.  2.  She has been off of Crestor for couple weeks.  Labs completed as above.  Crestor refilled.  3.  Increase citalopram to 40 mg daily.  Small number of Valium refilled to use at bedtime for sleep with increase in her anxiety.  Also gave a small number of lorazepam to use as needed for  more daytime panic symptoms.  Discussed that hopefully with the increase in the citalopram she will not feel like she needs to use this as often.  I did also give her a prescription for hydroxyzine to try to see if she can reduce the use of the benzodiazepines for panic symptoms.  Vitamin D and TSH completed today as well.  4.  See #3.  5.  Stable.  Albuterol refilled.  Encourage smoking cessation.  6.  Valtrex refilled.  7.  She has had a previous stress test, which showed no ischemia.  She has had a prescription for nitro on hand, which she wanted refilled.  This was done.  CBC as above.  8.  She requested a prescription to have on hand for meclizine which she takes periodically for dizziness.  This is given.  9.  Comprehensive metabolic profile completed as above.  10.  TSH as above.  11.  CBC as above.  12.  She declines mammogram.  Declines repeat Pap/HPV today.  This was last normal on 9/23/2016.  Colon cancer screening offered.  She states that she has a Cologuard kit at home, which she has not yet completed.  Tdap is up-to-date, last completed 3/18/2011.  Jaycee Ochoa MD  [facility]    Portions of this dictation were created using the Dragon Nuance voice recognition system. Proofreading was completed but there may be errors in text.

## 2020-03-03 NOTE — LETTER
Joana Caicedo  [address] [place] [id number]    3/6/2020      Dear Ms. Caicedo,      I wanted to let you know about your recent labs.  Your vitamin D level was low at 14.9.  I would recommend taking high dose vitamin D 50,000 International Units weekly for 12 weeks.  I have sent a prescription for this over to your pharmacy.  Have your level repeated at the end of these 12 weeks.  If you are in normal range at that point, I would recommend staying on 2000 International Units daily year round.    Your TSH and Complete Blood Count were in good range.    Your cholesterol levels were pretty high, but I'm aware that you had been off of the crestor for a while.  You may want to consider having this rechecked in 12 weeks as well after taking this medication consistently.  I have put orders for your labs in your chart.    Your Comprehensive Metabolic Profile showed your potassium was very slightly decreased, which is probably a side effect of your hydrochlorothiazide use.  I would recommend eating a banana daily to help improve this.    Please contact us at [phone] with any questions or concerns that you have and to schedule your lab visit when you are ready.    I have attached your lab results for your records.        Sincerely,         Jaycee Ochoa MD     Resulted Orders   Vitamin D Total   Result Value Ref Range    Vitamin D Total 14.9 ng/mL      Comment:      Comments:  Deficiency:             <10 ng/mL  Insufficiency:        10-29 ng/mL  Sufficiency:          ng/mL  Possible Toxicity:     >100 ng/mL     Thyrotropin GH   Result Value Ref Range    Thyrotropin 1.48 0.34 - 5.60 IU/mL   CBC and Differential   Result Value Ref Range    WBC 11.5 (H) 4.0 - 11.0 10e9/L    RBC Count 4.87 3.8 - 5.2 10e12/L    Hemoglobin 15.0 11.7 - 15.7 g/dL    Hematocrit 43.6 35.0 - 47.0 %    MCV 90 78 - 100 fl    MCH 30.8 26.5 - 33.0 pg    MCHC 34.4 31.5 - 36.5 g/dL    RDW 12.2 10.0 - 15.0 %    Platelet  Count 297 150 - 450 10e9/L    Diff Method Automated Method     % Neutrophils 77.8 %    % Lymphocytes 15.0 %    % Monocytes 5.0 %    % Eosinophils 0.9 %    % Basophils 0.9 %    % Immature Granulocytes 0.4 %    Absolute Neutrophil 9.0 (H) 1.6 - 8.3 10e9/L    Absolute Lymphocytes 1.7 0.8 - 5.3 10e9/L    Absolute Monocytes 0.6 0.0 - 1.3 10e9/L    Absolute Eosinophils 0.1 0.0 - 0.7 10e9/L    Absolute Basophils 0.1 0.0 - 0.2 10e9/L    Abs Immature Granulocytes 0.1 0 - 0.4 10e9/L   Lipid Panel   Result Value Ref Range    Cholesterol 283 (H) <200 mg/dL    Triglycerides 1,125 (H) <150 mg/dL      Comment:      Borderline high:  150-199 mg/dl  High:             200-499 mg/dl  Very high:       >499 mg/dl      HDL Cholesterol 27 23 - 92 mg/dL    LDL Cholesterol Calculated  <100 mg/dL     Cannot estimate LDL when triglyceride exceeds 400 mg/dL      Comment:      Desirable:       <100 mg/dl    Non HDL Cholesterol 256 (H) <130 mg/dL      Comment:      Above Desirable:  130-159 mg/dl  Borderline high:  160-189 mg/dl  High:             190-219 mg/dl  Very high:       >219 mg/dl     Comprehensive Metabolic Panel   Result Value Ref Range    Sodium 137 134 - 144 mmol/L    Potassium 3.3 (L) 3.5 - 5.1 mmol/L    Chloride 99 98 - 107 mmol/L    Carbon Dioxide 28 21 - 31 mmol/L    Anion Gap 10 3 - 14 mmol/L    Glucose 96 70 - 105 mg/dL    Urea Nitrogen 5 (L) 7 - 25 mg/dL    Creatinine 0.65 0.60 - 1.20 mg/dL    GFR Estimate >90 >60 mL/min/[1.73_m2]    GFR Estimate If Black >90 >60 mL/min/[1.73_m2]    Calcium 9.7 8.6 - 10.3 mg/dL    Bilirubin Total 0.6 0.3 - 1.0 mg/dL    Albumin 4.3 3.5 - 5.7 g/dL    Protein Total 7.2 6.4 - 8.9 g/dL    Alkaline Phosphatase 97 34 - 104 U/L    ALT 18 7 - 52 U/L    AST 12 (L) 13 - 39 U/L

## 2020-03-03 NOTE — NURSING NOTE
"Patient presents to the clinic today for a physical, labs and medication refill.  Neida Hughes LPN 3/3/2020   9:00 AM    Chief Complaint   Patient presents with     Physical     Refill Request       Initial /62 (BP Location: Right arm, Patient Position: Sitting, Cuff Size: Adult Regular)   Pulse 80   Temp 97.8  F (36.6  C) (Tympanic)   Resp 16   Ht 1.62 m (5' 3.78\")   Wt 68 kg (150 lb)   LMP  (LMP Unknown)   SpO2 98%   Breastfeeding No   BMI 25.93 kg/m   Estimated body mass index is 25.93 kg/m  as calculated from the following:    Height as of this encounter: 1.62 m (5' 3.78\").    Weight as of this encounter: 68 kg (150 lb).  Medication Reconciliation: complete  Neida Hughes LPN    "

## 2020-03-04 ASSESSMENT — ENCOUNTER SYMPTOMS
NERVOUS/ANXIOUS: 1
CHILLS: 0
COUGH: 0
FEVER: 0

## 2020-03-06 RX ORDER — ERGOCALCIFEROL 1.25 MG/1
50000 CAPSULE, LIQUID FILLED ORAL WEEKLY
Qty: 12 CAPSULE | Refills: 0 | Status: SHIPPED | OUTPATIENT
Start: 2020-03-06 | End: 2020-05-23

## 2020-08-15 DIAGNOSIS — F34.1 DYSTHYMIC DISORDER: ICD-10-CM

## 2020-08-17 RX ORDER — DIAZEPAM 2 MG
2 TABLET ORAL
Qty: 10 TABLET | Refills: 0 | Status: SHIPPED | OUTPATIENT
Start: 2020-08-17 | End: 2020-11-27

## 2020-08-17 NOTE — TELEPHONE ENCOUNTER
Dr. Brigette Bone out of office will route to covering team let for review and consideration.    Rehana Hall RN on 8/17/2020 at 11:41 AM

## 2020-08-17 NOTE — TELEPHONE ENCOUNTER
Routing refill request to provider for review/approval because:  Drug not on the [facility] refill protocol     LOV :3/3/2020  Rehana Hall RN on 8/17/2020 at 9:35 AM

## 2020-08-17 NOTE — TELEPHONE ENCOUNTER
I am out of the clinic today (back Thursday).  I tried to authorize this request from home, but it failed both times I tried.  Jaycee Ochoa MD

## 2020-11-25 DIAGNOSIS — F41.9 ANXIETY: ICD-10-CM

## 2020-11-25 DIAGNOSIS — F34.1 DYSTHYMIC DISORDER: ICD-10-CM

## 2020-11-27 RX ORDER — LORAZEPAM 0.5 MG/1
0.5 TABLET ORAL EVERY 6 HOURS
Qty: 30 TABLET | Refills: 0 | Status: SHIPPED | OUTPATIENT
Start: 2020-11-27 | End: 2021-06-03

## 2020-11-27 RX ORDER — DIAZEPAM 2 MG
2 TABLET ORAL
Qty: 10 TABLET | Refills: 0 | Status: SHIPPED | OUTPATIENT
Start: 2020-11-27 | End: 2021-06-03

## 2020-11-27 NOTE — TELEPHONE ENCOUNTER
Routing refill request to provider for review/approval because:  Drug not on the [facility] refill protocol     LOV: 3/3/2020  Rehana Hall RN on 11/27/2020 at 3:31 PM

## 2020-11-27 NOTE — TELEPHONE ENCOUNTER
Routing refill request to provider for review/approval because:  Drug not on the [facility] refill protocol     LOV: 3/3/2020  Rehana Hall RN on 11/27/2020 at 3:34 PM

## 2021-02-09 DIAGNOSIS — F34.1 DYSTHYMIC DISORDER: ICD-10-CM

## 2021-02-09 RX ORDER — CITALOPRAM HYDROBROMIDE 40 MG/1
TABLET ORAL
Qty: 90 TABLET | Refills: 0 | Status: SHIPPED | OUTPATIENT
Start: 2021-02-09 | End: 2021-06-03 | Stop reason: DRUGHIGH

## 2021-02-09 NOTE — TELEPHONE ENCOUNTER
Routing refill request to provider for review/approval because:  Labs not current:  PHQ 9    LOV: 3/3/2020    Rehana Hall RN on 2/9/2021 at 2:47 PM

## 2021-03-03 DIAGNOSIS — F34.1 DYSTHYMIC DISORDER: ICD-10-CM

## 2021-03-03 NOTE — TELEPHONE ENCOUNTER
Routing refill request to provider for review/approval because:  Drug not on the [facility] refill protocol     LOV: 3/3/2020  Rehana Hall RN on 3/3/2021 at 4:31 PM

## 2021-03-04 DIAGNOSIS — F41.9 ANXIETY: ICD-10-CM

## 2021-03-04 RX ORDER — LORAZEPAM 0.5 MG/1
0.5 TABLET ORAL EVERY 6 HOURS
Qty: 30 TABLET | Refills: 0 | OUTPATIENT
Start: 2021-03-04

## 2021-03-04 RX ORDER — DIAZEPAM 2 MG
2 TABLET ORAL
Qty: 10 TABLET | Refills: 0 | OUTPATIENT
Start: 2021-03-04

## 2021-03-04 NOTE — TELEPHONE ENCOUNTER
Left message for patient requesting a return call to remind her she is due for a visit. Will also send reminder letter   LORazepam (ATIVAN) 0.5 MG tablet  Last Written Prescription Date: 11/27/2020  Last Fill Quantity: 30,   # refills: 0  Last Office Visit: 03/30/2020  Future Office visit:       Routing refill request to provider for review/approval because:  Drug not on the [facility], [facility] or [facility] refill protocol or controlled substance.     Unable to complete prescription refill per RNMedication Refill Policy.................... Yvonne Esparza RN ....................  3/4/2021   8:19 AM

## 2021-03-04 NOTE — LETTER
March 4, 2021      Joana Caicedo  [address] [place] [id number]        Dear Joana,     This is to remind you that you are due for your medication management visit.  Your last visit was on 03/30/2020.     Additional refills of your medication require you to complete this visit.    Please call [phone] to schedule your appointment.    Thank you for choosing [facility] and [facility] for your health care needs.    Sincerely,      Refill RN  [facility]

## 2021-03-08 DIAGNOSIS — F34.1 DYSTHYMIC DISORDER: ICD-10-CM

## 2021-03-08 DIAGNOSIS — F41.9 ANXIETY: ICD-10-CM

## 2021-03-09 RX ORDER — LORAZEPAM 0.5 MG/1
0.5 TABLET ORAL EVERY 6 HOURS
Qty: 30 TABLET | OUTPATIENT
Start: 2021-03-09

## 2021-03-09 RX ORDER — DIAZEPAM 2 MG
2 TABLET ORAL
Qty: 10 TABLET | OUTPATIENT
Start: 2021-03-09

## 2021-03-09 NOTE — TELEPHONE ENCOUNTER
[id number] sent Rx request for the following:      DIAZEPAM 2MG TABLET  Sig: TAKE 1 TABLET (2 MG) BY MOUTH NIGHTLY AS NEEDED FOR ANXIETY      Last Prescription Date:   11/27/2020  Last Fill Qty/Refills:         10, R-0    Last Office Visit:              3/3/2020   Future Office visit:           none       LORAZEPAM 0.5MG TABLET  Sig: TAKE 1 TABLET (0.5 MG) BY MOUTH EVERY 6 HOURS      Last Prescription Date:   11/27/2020  Last Fill Qty/Refills:         30, R-0        Routing refill request to provider for review/approval because:  Drug not on the [facility], [facility] or [facility] refill protocol or controlled substance    Unable to complete prescription refill per RN Medication Refill Policy.................... Stephen Parekh RN ....................  3/9/2021   4:50 PM

## 2021-03-19 DIAGNOSIS — E78.5 HYPERLIPIDEMIA, UNSPECIFIED HYPERLIPIDEMIA TYPE: ICD-10-CM

## 2021-03-19 DIAGNOSIS — R42 DIZZINESS: ICD-10-CM

## 2021-03-19 DIAGNOSIS — I10 ESSENTIAL HYPERTENSION: ICD-10-CM

## 2021-03-19 NOTE — LETTER
March 23, 2021      Joana Caicedo  [address] [place] [phone]        Dear Joana,       A refill of Lisinopril, Meclizine, and Rosuvastatin have been requested by your pharmacy and we noticed that you are overdue for an annual exam.  Your last comprehensive visit with Jaycee Ochoa MD   was on 3/3/2020.    This refill request has been sent to your provider for consideration at this time.    Your health is very important to us.  Please call the clinic at [phone] to schedule your appointment.    Thank you for choosing [facility] and [facility] for your health care needs.    Sincerely,    Refill MELISSA  [facility]

## 2021-03-22 NOTE — TELEPHONE ENCOUNTER
"Pt needs appointment, left generic message.     Ronald Perdue8  sent Rx request for the following:     rosuvastatin (CRESTOR) 20 MG tablet  Sig TAKE 1 TABLET (20 MG) BY MOUTH DAILY  Last Prescription Date:   3/3/2020  Last Fill Qty/Refills:         90, R-3    Last Office Visit:              3/3/2020  Future Office visit:           none    Routing refill request to provider for review/approval because:  Patient needs to be seen because it has been more than 1 year since last office visit. Letter sent.         Statins Protocol Failed - 3/22/2021  9:39 AM        Failed - LDL on file in past 12 months     Recent Labs   Lab Test 03/03/20  0950   LDL Cannot estimate LDL when triglyceride exceeds 400 mg/dL             Failed - Recent (12 mo) or future (30 days) visit within the authorizing provider's specialty     Patient has had an office visit with the authorizing provider or a provider within the authorizing providers department within the previous 12 mos or has a future within next 30 days. See \"Patient Info\" tab in inbasket, or \"Choose Columns\" in Meds & Orders section of the refill encounter.              Passed - No abnormal creatine kinase in past 12 months     No lab results found.             Passed - Medication is active on med list        Passed - Patient is age 18 or older        Passed - No active pregnancy on record        Passed - No positive pregnancy test in past 12 months          meclizine (ANTIVERT) 25 MG tablet  Sig Take 1 tablet (25 mg) by mouth 3 times daily as needed for dizziness    Last Prescription Date:   3/3/2020  Last Fill Qty/Refills:         90, R-11        Routing refill request to provider for review/approval because:  Patient needs to be seen because it has been more than 1 year since last office visit.        Antivertigo/Antiemetic Agents Failed - 3/22/2021  9:39 AM        Failed - Recent (12 mo) or future (30 days) visit within the authorizing provider's specialty     Patient has " "had an office visit with the authorizing provider or a provider within the authorizing providers department within the previous 12 mos or has a future within next 30 days. See \"Patient Info\" tab in inbasket, or \"Choose Columns\" in Meds & Orders section of the refill encounter.              Passed - Medication is active on med list        Passed - Patient is 18 years of age or older              lisinopril (ZESTRIL) 20 MG tablet  Sig  Take 1 tablet (20 mg) by mouth daily  Last Prescription Date:   3/3/2020  Last Fill Qty/Refills:         90, R-3        Routing refill request to provider for review/approval because:  Patient needs to be seen because it has been more than 1 year since last office visit.          ACE Inhibitors (Including Combos) Protocol Failed - 3/22/2021  9:39 AM        Failed - Blood pressure under 140/90 in past 12 months     BP Readings from Last 3 Encounters:   03/03/20 104/62   08/21/19 112/74   04/17/19 104/68                 Failed - Recent (12 mo) or future (30 days) visit within the authorizing provider's specialty     Patient has had an office visit with the authorizing provider or a provider within the authorizing providers department within the previous 12 mos or has a future within next 30 days. See \"Patient Info\" tab in inbasket, or \"Choose Columns\" in Meds & Orders section of the refill encounter.              Failed - Normal serum creatinine on file in past 12 months     Recent Labs   Lab Test 03/03/20  0950   CR 0.65       Ok to refill medication if creatinine is low          Failed - Normal serum potassium on file in past 12 months     Recent Labs   Lab Test 03/03/20  0950   POTASSIUM 3.3*             Passed - Medication is active on med list        Passed - Patient is age 18 or older        Passed - No active pregnancy on record        Passed - No positive pregnancy test within past 12 months             Unable to complete prescription refill per RN Medication Refill " Policy.................... Ny Hernandez RN ....................  3/23/2021   12:55 PM

## 2021-03-23 RX ORDER — ROSUVASTATIN CALCIUM 20 MG/1
20 TABLET, COATED ORAL DAILY
Qty: 30 TABLET | Refills: 0 | Status: SHIPPED | OUTPATIENT
Start: 2021-03-23 | End: 2021-06-03

## 2021-03-23 RX ORDER — MECLIZINE HYDROCHLORIDE 25 MG/1
25 TABLET ORAL 3 TIMES DAILY PRN
Qty: 30 TABLET | Refills: 0 | Status: SHIPPED | OUTPATIENT
Start: 2021-03-23 | End: 2021-06-03

## 2021-03-23 RX ORDER — LISINOPRIL 20 MG/1
20 TABLET ORAL DAILY
Qty: 30 TABLET | Refills: 0 | Status: SHIPPED | OUTPATIENT
Start: 2021-03-23 | End: 2021-06-03

## 2021-04-30 DIAGNOSIS — I10 ESSENTIAL HYPERTENSION: ICD-10-CM

## 2021-04-30 RX ORDER — AMLODIPINE BESYLATE 5 MG/1
5 TABLET ORAL DAILY
Qty: 90 TABLET | Refills: 0 | Status: SHIPPED | OUTPATIENT
Start: 2021-04-30 | End: 2021-06-03

## 2021-04-30 RX ORDER — HYDROCHLOROTHIAZIDE 25 MG/1
25 TABLET ORAL DAILY
Qty: 90 TABLET | Refills: 0 | Status: SHIPPED | OUTPATIENT
Start: 2021-04-30 | End: 2021-06-03

## 2021-04-30 NOTE — TELEPHONE ENCOUNTER
"[id number] sent Rx request for the following:     amLODIPine (NORVASC) 5 MG tablet  Sig TAKE 1 TABLET (5 MG) BY MOUTH DAILY    Last Prescription Date:   3/3/2020  Last Fill Qty/Refills:         90, R-3    Last Office Visit:              3/3/2020  Future Office visit:           none    Routing refill request to provider for review/approval because:  Blood pressure out of range   Patient needs to be seen because it has been more than 1 year since last office visit.  Attempted to contact via telephone, message left. Mailed letter to patient to schedule appointment.         Calcium Channel Blockers Protocol  Failed - 4/30/2021  1:01 AM        Failed - Blood pressure under 140/90 in past 12 months     BP Readings from Last 3 Encounters:   03/03/20 104/62   08/21/19 112/74   04/17/19 104/68                 Failed - Recent (12 mo) or future (30 days) visit within the authorizing provider's specialty     Patient has had an office visit with the authorizing provider or a provider within the authorizing providers department within the previous 12 mos or has a future within next 30 days. See \"Patient Info\" tab in inbasket, or \"Choose Columns\" in Meds & Orders section of the refill encounter.              Failed - Normal serum creatinine on file in past 12 months     Recent Labs   Lab Test 03/03/20  0950   CR 0.65       Ok to refill medication if creatinine is low       hydrochlorothiazide (HYDRODIURIL) 25 MG tablet  Sig TAKE 1 TABLET (25 MG) BY MOUTH DAILY    Last Prescription Date:   3/3/2020  Last Fill Qty/Refills:         90, R-3        Routing refill request to provider for review/approval because:  Blood pressure out of range   Patient needs to be seen because it has been more than 1 year since last office visit.        Diuretics (Including Combos) Protocol Failed - 4/30/2021  1:01 AM        Failed - Blood pressure under 140/90 in past 12 months     BP Readings from Last 3 Encounters:   03/03/20 104/62   08/21/19 " "112/74   04/17/19 104/68                 Failed - Recent (12 mo) or future (30 days) visit within the authorizing provider's specialty     Patient has had an office visit with the authorizing provider or a provider within the authorizing providers department within the previous 12 mos or has a future within next 30 days. See \"Patient Info\" tab in inbasket, or \"Choose Columns\" in Meds & Orders section of the refill encounter.              Failed - Normal serum creatinine on file in past 12 months     Recent Labs   Lab Test 03/03/20  0950   CR 0.65              Failed - Normal serum potassium on file in past 12 months     Recent Labs   Lab Test 03/03/20  0950   POTASSIUM 3.3*                    Failed - Normal serum sodium on file in past 12 months     Recent Labs   Lab Test 03/03/20  0950              Unable to complete prescription refill per RN Medication Refill Policy.................... Ny Hernandez RN ....................  4/30/2021   3:27 PM          "

## 2021-04-30 NOTE — LETTER
April 30, 2021      Joana Caicedo  [address] [place] [phone]        Dear Joana,       A refill of Hydrochlorothizide and Amlodipine have been requested by your pharmacy and we noticed that you are overdue for an annual exam.  Your last comprehensive visit with Jaycee Ochoa MD was on 3/3/2020.    This refill request has been sent to your provider for consideration at this time.    Your health is very important to us.  Please call the clinic at [phone] to schedule your appointment.    Thank you for choosing [facility] and [facility] for your health care needs.    Sincerely,    Refill RN  [facility]

## 2021-06-01 ENCOUNTER — TELEPHONE (OUTPATIENT)
Dept: FAMILY MEDICINE | Facility: OTHER | Age: 56
End: 2021-06-01

## 2021-06-01 DIAGNOSIS — I10 ESSENTIAL HYPERTENSION: ICD-10-CM

## 2021-06-03 ENCOUNTER — OFFICE VISIT (OUTPATIENT)
Dept: FAMILY MEDICINE | Facility: OTHER | Age: 56
End: 2021-06-03
Attending: FAMILY MEDICINE
Payer: COMMERCIAL

## 2021-06-03 VITALS
OXYGEN SATURATION: 95 % | HEART RATE: 90 BPM | TEMPERATURE: 98.1 F | SYSTOLIC BLOOD PRESSURE: 122 MMHG | BODY MASS INDEX: 26.16 KG/M2 | DIASTOLIC BLOOD PRESSURE: 60 MMHG | RESPIRATION RATE: 20 BRPM | HEIGHT: 65 IN | WEIGHT: 157 LBS

## 2021-06-03 DIAGNOSIS — J98.9 REACTIVE AIRWAY DISEASE THAT IS NOT ASTHMA: ICD-10-CM

## 2021-06-03 DIAGNOSIS — R42 DIZZINESS: ICD-10-CM

## 2021-06-03 DIAGNOSIS — F41.9 ANXIETY: ICD-10-CM

## 2021-06-03 DIAGNOSIS — E78.5 HYPERLIPIDEMIA, UNSPECIFIED HYPERLIPIDEMIA TYPE: ICD-10-CM

## 2021-06-03 DIAGNOSIS — M54.16 LUMBAR RADICULOPATHY: ICD-10-CM

## 2021-06-03 DIAGNOSIS — I10 ESSENTIAL HYPERTENSION: ICD-10-CM

## 2021-06-03 DIAGNOSIS — F34.1 DYSTHYMIC DISORDER: Primary | ICD-10-CM

## 2021-06-03 DIAGNOSIS — Z13.29 SCREENING FOR THYROID DISORDER: ICD-10-CM

## 2021-06-03 DIAGNOSIS — E55.9 VITAMIN D DEFICIENCY: ICD-10-CM

## 2021-06-03 DIAGNOSIS — Z13.0 SCREENING FOR DEFICIENCY ANEMIA: ICD-10-CM

## 2021-06-03 LAB — DEPRECATED CALCIDIOL+CALCIFEROL SERPL-MC: 18.7 NG/ML

## 2021-06-03 PROCEDURE — 82306 VITAMIN D 25 HYDROXY: CPT | Mod: ZL | Performed by: FAMILY MEDICINE

## 2021-06-03 PROCEDURE — 99214 OFFICE O/P EST MOD 30 MIN: CPT | Performed by: FAMILY MEDICINE

## 2021-06-03 PROCEDURE — 36415 COLL VENOUS BLD VENIPUNCTURE: CPT | Mod: ZL | Performed by: FAMILY MEDICINE

## 2021-06-03 RX ORDER — ROSUVASTATIN CALCIUM 20 MG/1
20 TABLET, COATED ORAL DAILY
Qty: 90 TABLET | Refills: 3 | Status: SHIPPED | OUTPATIENT
Start: 2021-06-03 | End: 2022-08-10

## 2021-06-03 RX ORDER — ALBUTEROL SULFATE 90 UG/1
2 AEROSOL, METERED RESPIRATORY (INHALATION) EVERY 4 HOURS PRN
Qty: 18 G | Refills: 11 | Status: SHIPPED | OUTPATIENT
Start: 2021-06-03 | End: 2022-12-22

## 2021-06-03 RX ORDER — LISINOPRIL 20 MG/1
20 TABLET ORAL DAILY
Qty: 90 TABLET | Refills: 3 | Status: CANCELLED | OUTPATIENT
Start: 2021-06-03

## 2021-06-03 RX ORDER — LISINOPRIL 20 MG/1
20 TABLET ORAL DAILY
Qty: 90 TABLET | Refills: 3 | Status: SHIPPED | OUTPATIENT
Start: 2021-06-03 | End: 2022-08-10

## 2021-06-03 RX ORDER — MECLIZINE HYDROCHLORIDE 25 MG/1
25 TABLET ORAL 3 TIMES DAILY PRN
Qty: 30 TABLET | Refills: 11 | Status: SHIPPED | OUTPATIENT
Start: 2021-06-03 | End: 2022-07-08

## 2021-06-03 RX ORDER — CITALOPRAM HYDROBROMIDE 40 MG/1
40 TABLET ORAL DAILY
Qty: 90 TABLET | Refills: 3 | Status: CANCELLED | OUTPATIENT
Start: 2021-06-03

## 2021-06-03 RX ORDER — HYDROCHLOROTHIAZIDE 25 MG/1
25 TABLET ORAL DAILY
Qty: 90 TABLET | Refills: 3 | Status: SHIPPED | OUTPATIENT
Start: 2021-06-03 | End: 2022-07-21

## 2021-06-03 RX ORDER — CITALOPRAM HYDROBROMIDE 20 MG/1
TABLET ORAL
Qty: 11 TABLET | Refills: 0 | Status: SHIPPED | OUTPATIENT
Start: 2021-06-03 | End: 2021-08-02

## 2021-06-03 RX ORDER — LORAZEPAM 0.5 MG/1
0.5 TABLET ORAL EVERY 6 HOURS
Qty: 30 TABLET | Refills: 2 | Status: SHIPPED | OUTPATIENT
Start: 2021-06-03 | End: 2022-07-08

## 2021-06-03 RX ORDER — AMLODIPINE BESYLATE 5 MG/1
5 TABLET ORAL DAILY
Qty: 90 TABLET | Refills: 3 | Status: SHIPPED | OUTPATIENT
Start: 2021-06-03 | End: 2022-08-10

## 2021-06-03 RX ORDER — FLUOXETINE 10 MG/1
10 CAPSULE ORAL DAILY
Qty: 7 CAPSULE | Refills: 0 | Status: SHIPPED | OUTPATIENT
Start: 2021-06-03 | End: 2021-08-02 | Stop reason: DRUGHIGH

## 2021-06-03 ASSESSMENT — ENCOUNTER SYMPTOMS
FEVER: 0
NERVOUS/ANXIOUS: 1
CHILLS: 0
COUGH: 0

## 2021-06-03 ASSESSMENT — ANXIETY QUESTIONNAIRES
GAD7 TOTAL SCORE: 6
5. BEING SO RESTLESS THAT IT IS HARD TO SIT STILL: NOT AT ALL
IF YOU CHECKED OFF ANY PROBLEMS ON THIS QUESTIONNAIRE, HOW DIFFICULT HAVE THESE PROBLEMS MADE IT FOR YOU TO DO YOUR WORK, TAKE CARE OF THINGS AT HOME, OR GET ALONG WITH OTHER PEOPLE: NOT DIFFICULT AT ALL
3. WORRYING TOO MUCH ABOUT DIFFERENT THINGS: SEVERAL DAYS
6. BECOMING EASILY ANNOYED OR IRRITABLE: SEVERAL DAYS
1. FEELING NERVOUS, ANXIOUS, OR ON EDGE: SEVERAL DAYS
2. NOT BEING ABLE TO STOP OR CONTROL WORRYING: SEVERAL DAYS
7. FEELING AFRAID AS IF SOMETHING AWFUL MIGHT HAPPEN: SEVERAL DAYS

## 2021-06-03 ASSESSMENT — PATIENT HEALTH QUESTIONNAIRE - PHQ9
SUM OF ALL RESPONSES TO PHQ QUESTIONS 1-9: 8
5. POOR APPETITE OR OVEREATING: SEVERAL DAYS

## 2021-06-03 ASSESSMENT — MIFFLIN-ST. JEOR: SCORE: 1295.09

## 2021-06-03 ASSESSMENT — PAIN SCALES - GENERAL: PAINLEVEL: EXTREME PAIN (8)

## 2021-06-03 NOTE — TELEPHONE ENCOUNTER
Ronald Diaz sent Rx request for the following:     Requested Prescriptions   Pending Prescriptions Disp Refills     lisinopril (ZESTRIL) 20 MG tablet 30 tablet 0     Sig: Take 1 tablet (20 mg) by mouth daily     Last Prescription Date:   3/23/2021  Last Fill Qty/Refills:         30, R-0    Last Office Visit:              3/3/2020  Future Office visit:           Today    Routing refill request to provider for review/approval because:  Pt scheduled to be seen today with PCP.         ACE Inhibitors (Including Combos) Protocol Failed - 6/1/2021 12:56 PM        Failed - Blood pressure under 140/90 in past 12 months     BP Readings from Last 3 Encounters:   03/03/20 104/62   08/21/19 112/74   04/17/19 104/68                 Failed - Normal serum creatinine on file in past 12 months     Recent Labs   Lab Test 03/03/20  0950   CR 0.65       Ok to refill medication if creatinine is low          Failed - Normal serum potassium on file in past 12 months     Recent Labs   Lab Test 03/03/20  0950   POTASSIUM 3.3*        Unable to complete prescription refill per RN Medication Refill Policy.................... Ny Hernandez RN ....................  6/3/2021   9:02 AM

## 2021-06-03 NOTE — TELEPHONE ENCOUNTER
Patient has an appointment at 4 pm today . Kalie Hodges LPN ....................6/3/2021  9:54 AM

## 2021-06-03 NOTE — PATIENT INSTRUCTIONS
To switch from Celexa to Prozac:  Week #1:  Take Celexa 20 mg daily with Prozac 10 mg daily.  Week #2:  Take Celexa 10 mg daily (1/2 of 20 mg) with Prozac 20 mg daily.  Week #3:  Stop Celexa.  Stay on same dose of Prozac 20 mg daily.

## 2021-06-03 NOTE — PROGRESS NOTES
SUBJECTIVE:   There are no exam notes on file for this visit.    Joana Caicedo is a 56 year old female who presents to clinic today for medication refills.    She states that she feels that her citalopram is not working well for her anxiety and depression.  She lost her mom several months ago.  Her dad lives in [place], [place] and she is going to spend time with him regularly helping him do things around his own house.  She has also noticed that she has had a lot more anxiety lately.  She has had weight gain since starting on the celexa.    She has had issues with pain in her left leg for about 7 months.  It seems centered around her left knee, but her knee itself doesn't seem to be the problem.  It feels like a jolting type pain.  Her back is not currently hurting.  No bowel/bladder dysfunction or saddle anesthesia.  No weakness of her legs.  No pain with movement of her knees or hips.    She has had a history of vitamin D deficiency in the past.  She would like to have this rechecked.  She is due for other labs as well, but is not fasting today.    HPI    I personally reviewed medications/allergies/history listed below:    Patient Active Problem List    Diagnosis Date Noted     Hyperlipidemia 02/09/2018     Priority: Medium     Hypertension 02/09/2018     Priority: Medium     Insomnia 02/09/2018     Priority: Medium     Nicotine addiction 02/09/2018     Priority: Medium     Dysthymic disorder 09/02/2009     Priority: Medium     Past Medical History:   Diagnosis Date     Personal history of other medical treatment (CODE)     Demyelination , Per patient, has had mini strokes     Pure hyperglyceridemia     No Comments Provided      Past Surgical History:   Procedure Laterality Date     APPENDECTOMY OPEN      No Comments Provided     CERCLAGE CERVICAL      x2 with last 2 pregnancies.     LAPAROSCOPIC TUBAL LIGATION      No Comments Provided     Family History   Problem Relation Age of Onset     Heart Disease Father          Heart Disease     Other - See Comments Father          stage III COPD     Heart Disease Mother         Heart Disease     Other - See Comments Mother          kidney disease     Abdominal Aortic Aneurysm Mother         had AAA and thoracic aneurysm     Other - See Comments Other         High cholesterol runs in family     Diabetes Sister         Diabetes     Other - See Comments Sister          fibromyalgia, celiac disease.     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  She is  from her  and wants to be  but financially cannot afford that.  Her  is living with the family in an apartment.  He has a girlfriend and Joana has a boyfriend as well.     Current Outpatient Medications   Medication Sig Dispense Refill     albuterol (PROAIR HFA/PROVENTIL HFA/VENTOLIN HFA) 108 (90 Base) MCG/ACT inhaler Inhale 2 puffs into the lungs every 4 hours as needed for shortness of breath / dyspnea 18 g 11     amLODIPine (NORVASC) 5 MG tablet Take 1 tablet (5 mg) by mouth daily 90 tablet 3     aspirin EC 81 MG EC tablet Take 1 tablet by mouth daily       citalopram (CELEXA) 20 MG tablet Take 1 by mouth daily x 7 days, then 1/2 by mouth daily x 7 days, then stop. 11 tablet 0     FLUoxetine (PROZAC) 10 MG capsule Take 1 capsule (10 mg) by mouth daily for 7 days , then increase to 20 mg daily. 7 capsule 0     FLUoxetine (PROZAC) 20 MG capsule Take 1 capsule (20 mg) by mouth daily 90 capsule 3     hydrochlorothiazide (HYDRODIURIL) 25 MG tablet Take 1 tablet (25 mg)  "by mouth daily 90 tablet 3     lisinopril (ZESTRIL) 20 MG tablet Take 1 tablet (20 mg) by mouth daily 90 tablet 3     LORazepam (ATIVAN) 0.5 MG tablet Take 1 tablet (0.5 mg) by mouth every 6 hours 30 tablet 2     meclizine (ANTIVERT) 25 MG tablet Take 1 tablet (25 mg) by mouth 3 times daily as needed for dizziness 30 tablet 11     rosuvastatin (CRESTOR) 20 MG tablet Take 1 tablet (20 mg) by mouth daily 90 tablet 3     valACYclovir (VALTREX) 1000 mg tablet TAKE 2 TABLETS BY MOUTH 2 TIMES DAILY FOR ONE DAY, THEN TAKE 1 TABLET TWICE DAILY FOR 3 DAYS. MAY REPEAT FOR FUTURE OUTBREAKS RE OUTBREA 30 tablet 11     Allergies   Allergen Reactions     Amoxicillin Other (See Comments)     Yeast infection       Bupropion      Other reaction(s): Other - Describe In Comment Field  Facial and neck swelling     Niacin      Other reaction(s): Flushing     Sumatriptan      Other reaction(s): Other - Describe In Comment Field  \"burning veins\"       Review of Systems   Constitutional: Negative for chills and fever.   Respiratory: Negative for cough.    Cardiovascular: Negative for peripheral edema.   Psychiatric/Behavioral: Positive for mood changes. The patient is nervous/anxious.         OBJECTIVE:     /60 (BP Location: Right arm, Patient Position: Sitting, Cuff Size: Adult Regular)   Pulse 90   Temp 98.1  F (36.7  C) (Tympanic)   Resp 20   Ht 1.638 m (5' 4.5\")   Wt 71.2 kg (157 lb)   LMP  (LMP Unknown)   SpO2 95%   BMI 26.53 kg/m    Body mass index is 26.53 kg/m .  Physical Exam  Constitutional:       Appearance: Normal appearance.   HENT:      Head: Normocephalic.   Eyes:      Extraocular Movements: Extraocular movements intact.      Pupils: Pupils are equal, round, and reactive to light.   Neck:      Musculoskeletal: Normal range of motion and neck supple.   Cardiovascular:      Rate and Rhythm: Normal rate and regular rhythm.      Pulses: Normal pulses.      Heart sounds: Normal heart sounds. No murmur.   Pulmonary: "      Effort: Pulmonary effort is normal.      Breath sounds: Normal breath sounds. No wheezing, rhonchi or rales.   Abdominal:      General: Abdomen is flat. Bowel sounds are normal.   Musculoskeletal:      Right lower leg: No edema.      Left lower leg: No edema.      Comments: No spinous process tenderness in lumbar region.  No SI or sciatic notch tenderness.  Normal strength with dorsi/plantar flexion at her ankles, flexion/extension at her knees and hips.   Lymphadenopathy:      Cervical: No cervical adenopathy.   Neurological:      Mental Status: She is alert.      Comments: DTRs are 2+ and symmetric at the knees and achilles bilaterally.   Psychiatric:         Mood and Affect: Mood normal.         Behavior: Behavior normal.           PHQ-9 SCORE 6/19/2017 11/2/2018 6/3/2021   PHQ-9 Total Score 8 13 8         MAUDE-7 SCORE 6/19/2017 11/2/2018 6/3/2021   Total Score 7 13 6         I personally reviewed results withpatient as listed below:   Diagnostic Test Results:  none     ASSESSMENT/PLAN:       ICD-10-CM    1. Dysthymic disorder  F34.1 FLUoxetine (PROZAC) 10 MG capsule     FLUoxetine (PROZAC) 20 MG capsule     citalopram (CELEXA) 20 MG tablet   2. Anxiety  F41.9 LORazepam (ATIVAN) 0.5 MG tablet   3. Lumbar radiculopathy  M54.16 MR Lumbar Spine w/o Contrast   4. Vitamin D deficiency  E55.9 Vitamin D Total     Vitamin D Total   5. Essential hypertension  I10 amLODIPine (NORVASC) 5 MG tablet     hydrochlorothiazide (HYDRODIURIL) 25 MG tablet     Comprehensive metabolic panel   6. Hyperlipidemia, unspecified hyperlipidemia type  E78.5 rosuvastatin (CRESTOR) 20 MG tablet     Comprehensive metabolic panel     Lipid Profile   7. Dizziness  R42 meclizine (ANTIVERT) 25 MG tablet   8. Reactive airway disease that is not asthma  R09.89 albuterol (PROAIR HFA/PROVENTIL HFA/VENTOLIN HFA) 108 (90 Base) MCG/ACT inhaler   9. Screening for deficiency anemia  Z13.0 CBC with platelets differential   10. Screening for thyroid  disorder  Z13.29 TSH Reflex GH       1.  Will transition from Celexa to Prozac as noted below.  Encouraged her to make an appointment for a physical and will follow up with her on this at that time.    To switch from Celexa to Prozac:  Week #1:  Take Celexa 20 mg daily with Prozac 10 mg daily.  Week #2:  Take Celexa 10 mg daily (1/2 of 20 mg) with Prozac 20 mg daily.  Week #3:  Stop Celexa.  Stay on same dose of Prozac 20 mg daily.    2.  See #1.  Lorazepam also is refilled.  She is no longer taking diazepam as it did not help her anxiety symptoms.  3.  Symptoms sound suspicious for lumbar radiculopathy.  Will obtain MRI of lumbar spine.  If no explanation for pain there, consider MRI of her left thigh/calf regions.  4.  Vitamin D level obtained today.  5.  Blood pressure is stable.  Amlodipine and hydrochlorothiazide refilled.  Will complete fasting Comprehensive Metabolic Profile with other labs in the near future.  6.  Rosuvastatin refilled.  Labs ordered as above.  7.  Meclizine refilled.  8.  Albuterol refilled.  9.  Complete Blood Count ordered as above.  10.  TSH ordered as above.    Jaycee Ochoa MD  [facility]

## 2021-06-04 ENCOUNTER — TELEPHONE (OUTPATIENT)
Dept: FAMILY MEDICINE | Facility: OTHER | Age: 56
End: 2021-06-04

## 2021-06-04 DIAGNOSIS — E55.9 VITAMIN D DEFICIENCY: Primary | ICD-10-CM

## 2021-06-04 RX ORDER — ERGOCALCIFEROL 1.25 MG/1
50000 CAPSULE, LIQUID FILLED ORAL WEEKLY
Qty: 12 CAPSULE | Refills: 0 | Status: SHIPPED | OUTPATIENT
Start: 2021-06-04 | End: 2021-08-21

## 2021-06-04 ASSESSMENT — ANXIETY QUESTIONNAIRES: GAD7 TOTAL SCORE: 6

## 2021-06-04 NOTE — TELEPHONE ENCOUNTER
Patient informed of Vitamin D lab results and recommendations     Mita Pabon CMA on 6/4/2021 at 2:46 PM

## 2021-06-09 ENCOUNTER — HOSPITAL ENCOUNTER (OUTPATIENT)
Dept: MRI IMAGING | Facility: OTHER | Age: 56
Discharge: HOME | End: 2021-06-09
Attending: FAMILY MEDICINE | Admitting: FAMILY MEDICINE
Payer: COMMERCIAL

## 2021-06-09 DIAGNOSIS — M54.16 LUMBAR RADICULOPATHY: ICD-10-CM

## 2021-06-09 PROCEDURE — 72148 MRI LUMBAR SPINE W/O DYE: CPT

## 2021-06-11 DIAGNOSIS — I10 ESSENTIAL HYPERTENSION: ICD-10-CM

## 2021-06-11 DIAGNOSIS — M54.16 LUMBAR RADICULOPATHY: Primary | ICD-10-CM

## 2021-06-11 DIAGNOSIS — E55.9 VITAMIN D DEFICIENCY: ICD-10-CM

## 2021-06-11 DIAGNOSIS — Z13.29 SCREENING FOR THYROID DISORDER: ICD-10-CM

## 2021-06-11 DIAGNOSIS — Z13.0 SCREENING FOR DEFICIENCY ANEMIA: ICD-10-CM

## 2021-06-11 DIAGNOSIS — E78.5 HYPERLIPIDEMIA, UNSPECIFIED HYPERLIPIDEMIA TYPE: ICD-10-CM

## 2021-06-11 LAB
ALBUMIN SERPL-MCNC: 4 G/DL (ref 3.5–5.7)
ALP SERPL-CCNC: 73 U/L (ref 34–104)
ALT SERPL W P-5'-P-CCNC: 16 U/L (ref 7–52)
ANION GAP SERPL CALCULATED.3IONS-SCNC: 8 MMOL/L (ref 3–14)
AST SERPL W P-5'-P-CCNC: 13 U/L (ref 13–39)
BASOPHILS # BLD AUTO: 0.1 10E9/L (ref 0–0.2)
BASOPHILS NFR BLD AUTO: 1.1 %
BILIRUB SERPL-MCNC: 0.5 MG/DL (ref 0.3–1)
BUN SERPL-MCNC: 13 MG/DL (ref 7–25)
CALCIUM SERPL-MCNC: 8.7 MG/DL (ref 8.6–10.3)
CHLORIDE SERPL-SCNC: 102 MMOL/L (ref 98–107)
CHOLEST SERPL-MCNC: 227 MG/DL
CO2 SERPL-SCNC: 28 MMOL/L (ref 21–31)
CREAT SERPL-MCNC: 0.72 MG/DL (ref 0.6–1.2)
DEPRECATED CALCIDIOL+CALCIFEROL SERPL-MC: 22.6 NG/ML
DIFFERENTIAL METHOD BLD: ABNORMAL
EOSINOPHIL # BLD AUTO: 0.2 10E9/L (ref 0–0.7)
EOSINOPHIL NFR BLD AUTO: 1.7 %
ERYTHROCYTE [DISTWIDTH] IN BLOOD BY AUTOMATED COUNT: 12.4 % (ref 10–15)
GFR SERPL CREATININE-BSD FRML MDRD: 84 ML/MIN/{1.73_M2}
GLUCOSE SERPL-MCNC: 76 MG/DL (ref 70–105)
HCT VFR BLD AUTO: 42.5 % (ref 35–47)
HDLC SERPL-MCNC: 28 MG/DL (ref 23–92)
HGB BLD-MCNC: 14.4 G/DL (ref 11.7–15.7)
IMM GRANULOCYTES # BLD: 0.1 10E9/L (ref 0–0.4)
IMM GRANULOCYTES NFR BLD: 0.4 %
LDLC SERPL CALC-MCNC: ABNORMAL MG/DL
LYMPHOCYTES # BLD AUTO: 2.8 10E9/L (ref 0.8–5.3)
LYMPHOCYTES NFR BLD AUTO: 24.9 %
MCH RBC QN AUTO: 30.6 PG (ref 26.5–33)
MCHC RBC AUTO-ENTMCNC: 33.9 G/DL (ref 31.5–36.5)
MCV RBC AUTO: 90 FL (ref 78–100)
MONOCYTES # BLD AUTO: 0.8 10E9/L (ref 0–1.3)
MONOCYTES NFR BLD AUTO: 7.2 %
NEUTROPHILS # BLD AUTO: 7.3 10E9/L (ref 1.6–8.3)
NEUTROPHILS NFR BLD AUTO: 64.7 %
NONHDLC SERPL-MCNC: 199 MG/DL
PLATELET # BLD AUTO: 282 10E9/L (ref 150–450)
POTASSIUM SERPL-SCNC: 3.4 MMOL/L (ref 3.5–5.1)
PROT SERPL-MCNC: 6.5 G/DL (ref 6.4–8.9)
RBC # BLD AUTO: 4.71 10E12/L (ref 3.8–5.2)
SODIUM SERPL-SCNC: 138 MMOL/L (ref 134–144)
TRIGL SERPL-MCNC: 721 MG/DL
TSH SERPL DL<=0.05 MIU/L-ACNC: 1.56 IU/ML (ref 0.34–5.6)
WBC # BLD AUTO: 11.3 10E9/L (ref 4–11)

## 2021-06-11 PROCEDURE — 85025 COMPLETE CBC W/AUTO DIFF WBC: CPT | Mod: ZL | Performed by: FAMILY MEDICINE

## 2021-06-11 PROCEDURE — 80061 LIPID PANEL: CPT | Mod: ZL | Performed by: FAMILY MEDICINE

## 2021-06-11 PROCEDURE — 36415 COLL VENOUS BLD VENIPUNCTURE: CPT | Mod: ZL | Performed by: FAMILY MEDICINE

## 2021-06-11 PROCEDURE — 84443 ASSAY THYROID STIM HORMONE: CPT | Mod: ZL | Performed by: FAMILY MEDICINE

## 2021-06-11 PROCEDURE — 80053 COMPREHEN METABOLIC PANEL: CPT | Mod: ZL | Performed by: FAMILY MEDICINE

## 2021-06-11 PROCEDURE — 82306 VITAMIN D 25 HYDROXY: CPT | Mod: ZL | Performed by: FAMILY MEDICINE

## 2021-06-23 DIAGNOSIS — F34.1 DYSTHYMIC DISORDER: ICD-10-CM

## 2021-06-23 RX ORDER — CITALOPRAM HYDROBROMIDE 40 MG/1
TABLET ORAL
Qty: 90 TABLET | Refills: 0 | OUTPATIENT
Start: 2021-06-23

## 2021-06-23 NOTE — TELEPHONE ENCOUNTER
Ronald Diaz sent Rx request for the following:     Requested Prescriptions   Pending Prescriptions Disp Refills     citalopram (CELEXA) 40 MG tablet [Pharmacy Med Name: CITALOPRAM 40MG TABLET] 90 tablet 0     Sig: TAKE 1/2 TABLET BY MOUTH DAILY FOR 1-2 WEEKS, THEN INCREASE TO 1 TABLET DAILY        Last Prescription Date:   6/3/2021  Last Fill Qty/Refills:         11, R-0    Last Office Visit:              6/3/2021  Future Office visit:           8/2/2021    RX  discontinued 6/3/2021 with a taper.     Unable to complete prescription refill per RN Medication Refill Policy.................... Ny Hernandez RN ....................  6/23/2021   4:26 PM

## 2021-08-02 ENCOUNTER — OFFICE VISIT (OUTPATIENT)
Dept: FAMILY MEDICINE | Facility: OTHER | Age: 56
End: 2021-08-02
Attending: FAMILY MEDICINE
Payer: COMMERCIAL

## 2021-08-02 VITALS
SYSTOLIC BLOOD PRESSURE: 118 MMHG | WEIGHT: 156.8 LBS | HEIGHT: 64 IN | TEMPERATURE: 97.6 F | DIASTOLIC BLOOD PRESSURE: 78 MMHG | OXYGEN SATURATION: 96 % | BODY MASS INDEX: 26.77 KG/M2 | HEART RATE: 75 BPM | RESPIRATION RATE: 18 BRPM

## 2021-08-02 DIAGNOSIS — Z12.11 SCREEN FOR COLON CANCER: ICD-10-CM

## 2021-08-02 DIAGNOSIS — Z00.00 HEALTH CARE MAINTENANCE: Primary | ICD-10-CM

## 2021-08-02 DIAGNOSIS — Z12.31 VISIT FOR SCREENING MAMMOGRAM: ICD-10-CM

## 2021-08-02 DIAGNOSIS — R51.9 NONINTRACTABLE HEADACHE, UNSPECIFIED CHRONICITY PATTERN, UNSPECIFIED HEADACHE TYPE: ICD-10-CM

## 2021-08-02 DIAGNOSIS — F34.1 DYSTHYMIC DISORDER: ICD-10-CM

## 2021-08-02 DIAGNOSIS — Z12.4 SCREENING FOR CERVICAL CANCER: ICD-10-CM

## 2021-08-02 DIAGNOSIS — R11.0 NAUSEA: ICD-10-CM

## 2021-08-02 DIAGNOSIS — Z23 NEED FOR TDAP VACCINATION: ICD-10-CM

## 2021-08-02 PROCEDURE — 99396 PREV VISIT EST AGE 40-64: CPT | Mod: 25 | Performed by: FAMILY MEDICINE

## 2021-08-02 PROCEDURE — 90471 IMMUNIZATION ADMIN: CPT | Performed by: FAMILY MEDICINE

## 2021-08-02 PROCEDURE — 90715 TDAP VACCINE 7 YRS/> IM: CPT | Performed by: FAMILY MEDICINE

## 2021-08-02 RX ORDER — FLUOXETINE 40 MG/1
40 CAPSULE ORAL DAILY
Qty: 90 CAPSULE | Refills: 3 | Status: SHIPPED | OUTPATIENT
Start: 2021-08-02 | End: 2022-08-10

## 2021-08-02 ASSESSMENT — PATIENT HEALTH QUESTIONNAIRE - PHQ9
10. IF YOU CHECKED OFF ANY PROBLEMS, HOW DIFFICULT HAVE THESE PROBLEMS MADE IT FOR YOU TO DO YOUR WORK, TAKE CARE OF THINGS AT HOME, OR GET ALONG WITH OTHER PEOPLE: SOMEWHAT DIFFICULT
SUM OF ALL RESPONSES TO PHQ QUESTIONS 1-9: 6
SUM OF ALL RESPONSES TO PHQ QUESTIONS 1-9: 6

## 2021-08-02 ASSESSMENT — ENCOUNTER SYMPTOMS
FEVER: 0
SHORTNESS OF BREATH: 0
CHILLS: 0
NERVOUS/ANXIOUS: 0
COUGH: 0

## 2021-08-02 ASSESSMENT — MIFFLIN-ST. JEOR: SCORE: 1286.24

## 2021-08-02 ASSESSMENT — ANXIETY QUESTIONNAIRES
3. WORRYING TOO MUCH ABOUT DIFFERENT THINGS: SEVERAL DAYS
8. IF YOU CHECKED OFF ANY PROBLEMS, HOW DIFFICULT HAVE THESE MADE IT FOR YOU TO DO YOUR WORK, TAKE CARE OF THINGS AT HOME, OR GET ALONG WITH OTHER PEOPLE?: SOMEWHAT DIFFICULT
6. BECOMING EASILY ANNOYED OR IRRITABLE: SEVERAL DAYS
5. BEING SO RESTLESS THAT IT IS HARD TO SIT STILL: NOT AT ALL
2. NOT BEING ABLE TO STOP OR CONTROL WORRYING: SEVERAL DAYS
7. FEELING AFRAID AS IF SOMETHING AWFUL MIGHT HAPPEN: SEVERAL DAYS
GAD7 TOTAL SCORE: 6
GAD7 TOTAL SCORE: 6
7. FEELING AFRAID AS IF SOMETHING AWFUL MIGHT HAPPEN: SEVERAL DAYS
GAD7 TOTAL SCORE: 6
1. FEELING NERVOUS, ANXIOUS, OR ON EDGE: SEVERAL DAYS
4. TROUBLE RELAXING: SEVERAL DAYS

## 2021-08-02 ASSESSMENT — PAIN SCALES - GENERAL: PAINLEVEL: SEVERE PAIN (6)

## 2021-08-02 NOTE — PATIENT INSTRUCTIONS
Covid vaccination information at [facility]:  At this time we are vaccinating all people age 12 and older with the Pfizer COVID vaccine.   Beginning June 28th, you may make an appointment, walk-in or receive your vaccine after your clinic appointment anytime between 8am-12pm and 1pm-4:10pm Monday - Friday.   To schedule your appointment please log in to Clip to see when and where we have openings. Appointments open up throughout the week, check back for additional openings. You can also schedule an appointment by calling our appointment line at [phone], weekdays 7:00AM - 5:00 PM.   We no longer have the [employer] vaccine available.   We appreciate your patience as we work to vaccinate as many people as we can as quickly, safely and efficiently as possible.

## 2021-08-02 NOTE — PROGRESS NOTES
"  SUBJECTIVE:   Nursing Notes:   Aarti Mena LPN  2021  9:31 AM  Sign at exiting of workspace  Chief Complaint   Patient presents with     Physical     ANNUAL PHYSICAL     FOOD SECURITY SCREENING QUESTIONS  Hunger Vital Signs:  Within the past 12 months we worried whether our food would run out before we got money to buy more. Never  Within the past 12 months the food we bought just didn't last and we didn't have money to get more. Never  Aarti Mena LPN 2021 9:30 AM    Initial /78 (BP Location: Right arm, Patient Position: Sitting, Cuff Size: Adult Regular)   Pulse 75   Temp 97.6  F (36.4  C) (Tympanic)   Resp 18   Ht 1.626 m (5' 4\")   Wt 71.1 kg (156 lb 12.8 oz)   LMP  (LMP Unknown)   SpO2 96%   Breastfeeding No   BMI 26.91 kg/m   Estimated body mass index is 26.91 kg/m  as calculated from the following:    Height as of this encounter: 1.626 m (5' 4\").    Weight as of this encounter: 71.1 kg (156 lb 12.8 oz).  Medication Reconciliation: complete    Aarti Mena LPN      Joana Caicedo is a 56 year old female who presents to clinic today for a physical.  However, she does not want to do a breast or pelvic exam today and would like to return to clinic another day for that.  Her last Pap Smear/HPV on 16 were both normal, but she has had abnormal Pap Smears in the past.    I saw her last on 6/3/2021.  Her anxiety was worse at that time and she was transitioned from Celexa to Prozac 20 mg daily.  It was a rough transition.  She has had a couple of friends who just  at young ages of cancer in the past week.    Complains that she doesn't feel well often.  Sometimes nauseous.  Sometimes headaches.  Gets very tired and has to lay down to rest.    HPI    I personally reviewed medications/allergies/history listed below:    Patient Active Problem List    Diagnosis Date Noted     Hyperlipidemia 2018     Priority: Medium     Hypertension 2018     Priority: Medium     " Insomnia 02/09/2018     Priority: Medium     Nicotine addiction 02/09/2018     Priority: Medium     Dysthymic disorder 09/02/2009     Priority: Medium     Past Medical History:   Diagnosis Date     Personal history of other medical treatment (CODE)     Demyelination , Per patient, has had mini strokes     Pure hyperglyceridemia     No Comments Provided      Past Surgical History:   Procedure Laterality Date     APPENDECTOMY OPEN      No Comments Provided     CERCLAGE CERVICAL      x2 with last 2 pregnancies.     LAPAROSCOPIC TUBAL LIGATION      No Comments Provided     Family History   Problem Relation Age of Onset     Heart Disease Father         Heart Disease     Other - See Comments Father          stage III COPD     Heart Disease Mother         Heart Disease     Other - See Comments Mother          kidney disease     Abdominal Aortic Aneurysm Mother         had AAA and thoracic aneurysm     Other - See Comments Other         High cholesterol runs in family     Diabetes Sister         Diabetes     Other - See Comments Sister          fibromyalgia, celiac disease.     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  She is  from her  and wants to be  but financially cannot afford that.  Her  is living with the family in an apartment.  He has a girlfriend and Joana has a boyfriend as well.     Current Outpatient Medications   Medication Sig Dispense Refill     albuterol (PROAIR HFA/PROVENTIL HFA/VENTOLIN HFA) 108 (90 Base) MCG/ACT inhaler Inhale 2 puffs  "into the lungs every 4 hours as needed for shortness of breath / dyspnea 18 g 11     amLODIPine (NORVASC) 5 MG tablet Take 1 tablet (5 mg) by mouth daily 90 tablet 3     aspirin EC 81 MG EC tablet Take 1 tablet by mouth daily       FLUoxetine (PROZAC) 40 MG capsule Take 1 capsule (40 mg) by mouth daily 90 capsule 3     hydrochlorothiazide (HYDRODIURIL) 25 MG tablet Take 1 tablet (25 mg) by mouth daily 90 tablet 3     lisinopril (ZESTRIL) 20 MG tablet Take 1 tablet (20 mg) by mouth daily 90 tablet 3     LORazepam (ATIVAN) 0.5 MG tablet Take 1 tablet (0.5 mg) by mouth every 6 hours 30 tablet 2     meclizine (ANTIVERT) 25 MG tablet Take 1 tablet (25 mg) by mouth 3 times daily as needed for dizziness 30 tablet 11     rosuvastatin (CRESTOR) 20 MG tablet Take 1 tablet (20 mg) by mouth daily 90 tablet 3     valACYclovir (VALTREX) 1000 mg tablet TAKE 2 TABLETS BY MOUTH 2 TIMES DAILY FOR ONE DAY, THEN TAKE 1 TABLET TWICE DAILY FOR 3 DAYS. MAY REPEAT FOR FUTURE OUTBREAKS RE OUTBREA 30 tablet 11     vitamin D2 (ERGOCALCIFEROL) 10677 units (1250 mcg) capsule Take 1 capsule (50,000 Units) by mouth once a week for 12 doses 12 capsule 0     Allergies   Allergen Reactions     Amoxicillin Other (See Comments)     Yeast infection       Bupropion      Other reaction(s): Other - Describe In Comment Field  Facial and neck swelling     Niacin      Other reaction(s): Flushing     Sumatriptan      Other reaction(s): Other - Describe In Comment Field  \"burning veins\"       Review of Systems   Constitutional: Negative for chills and fever.   Respiratory: Negative for cough and shortness of breath.    Cardiovascular: Negative for peripheral edema.   Psychiatric/Behavioral: Negative for mood changes. The patient is not nervous/anxious.         OBJECTIVE:     /78 (BP Location: Right arm, Patient Position: Sitting, Cuff Size: Adult Regular)   Pulse 75   Temp 97.6  F (36.4  C) (Tympanic)   Resp 18   Ht 1.626 m (5' 4\")   Wt 71.1 kg (156 " lb 12.8 oz)   LMP  (LMP Unknown)   SpO2 96%   Breastfeeding No   BMI 26.91 kg/m    Body mass index is 26.91 kg/m .  Physical Exam  Constitutional:       Appearance: Normal appearance. She is well-developed.   HENT:      Head: Normocephalic.      Right Ear: External ear normal.      Left Ear: External ear normal.      Nose: Nose normal.   Eyes:      Pupils: Pupils are equal, round, and reactive to light.   Neck:      Thyroid: No thyromegaly.   Cardiovascular:      Rate and Rhythm: Normal rate and regular rhythm.      Heart sounds: Normal heart sounds. No murmur heard.     Pulmonary:      Effort: Pulmonary effort is normal. No respiratory distress.      Breath sounds: Normal breath sounds. No wheezing or rales.      Comments: Breast exam declined per patient.  Chest:      Chest wall: No tenderness.   Abdominal:      General: Bowel sounds are normal. There is no distension.      Palpations: Abdomen is soft. There is no mass.      Tenderness: There is no abdominal tenderness. There is no guarding or rebound.   Genitourinary:     Comments: Pelvic exam declined per patient.  Musculoskeletal:         General: No tenderness or deformity.      Cervical back: Normal range of motion and neck supple.   Lymphadenopathy:      Cervical: No cervical adenopathy.   Skin:     General: Skin is warm and dry.   Neurological:      Mental Status: She is alert and oriented to person, place, and time.      Cranial Nerves: No cranial nerve deficit.      Coordination: Coordination normal.           PHQ-9 SCORE 11/2/2018 6/3/2021 8/2/2021   PHQ-9 Total Score MyChart - - 6 (Mild depression)   PHQ-9 Total Score 13 8 6       PHQ-2 Score:     PHQ-2 ( 1999 Pfizer) 8/21/2019 4/17/2019   Q1: Little interest or pleasure in doing things 0 0   Q2: Feeling down, depressed or hopeless 0 0   PHQ-2 Score 0 0       MAUDE-7 SCORE 11/2/2018 6/3/2021 8/2/2021   Total Score - - 6 (mild anxiety)   Total Score 13 6 6         No flowsheet data found.      I  personally reviewed results withpatient as listed below:   Diagnostic Test Results:  none     ASSESSMENT/PLAN:       ICD-10-CM    1. Health care maintenance  Z00.00    2. Visit for screening mammogram  Z12.31 MA Screen Bilateral w/Braxton   3. Screening for cervical cancer  Z12.4    4. Need for Tdap vaccination  Z23 TDAP VACCINE (Adacel, Boostrix)  [[id number]]   5. Screen for colon cancer  Z12.11 COLOGUARD([employer])   6. Dysthymic disorder  F34.1 FLUoxetine (PROZAC) 40 MG capsule   7. Nonintractable headache, unspecified chronicity pattern, unspecified headache type  R51.9 MR Brain w/o & w Contrast     CANCELED: MR Brain w/o Contrast   8. Nausea  R11.0        1.  Mammogram has not been done since 2008.  This is ordered today.  Cologuard ordered.  She has not had prior colon cancer screening.  Pap Smear/HPV are due as noted above.  She is going to return to clinic another day soon to do this.    2.  See #1.  3.  See #1.  4.  Tdap updated today.  She declines Covid vaccine currently.  5.  See #1.  6.  She is interested in increasing her dose of Fluoxetine to 40 mg daily.  Prescription given for increased dose.  7.  She did agree to an MRI of her brain for further evaluation.  8.  May be related to #7.  Declines CT of the abdomen or EGD/Colonoscopy at this time.  She had a recent Comprehensive Metabolic Profile in June, which was normal.    Jaycee Ochoa MD  [facility]      The 10-year ASCVD risk score (Kayley LAN Jr., et al., 2013) is: 11.9%    Values used to calculate the score:      Age: 56 years      Sex: Female      Is Non- : No      Diabetic: No      Tobacco smoker: Yes      Systolic Blood Pressure: 118 mmHg      Is BP treated: Yes      HDL Cholesterol: 28 mg/dL      Total Cholesterol: 227 mg/dL

## 2021-08-02 NOTE — NURSING NOTE
"Chief Complaint   Patient presents with     Physical     ANNUAL PHYSICAL     FOOD SECURITY SCREENING QUESTIONS  Hunger Vital Signs:  Within the past 12 months we worried whether our food would run out before we got money to buy more. Never  Within the past 12 months the food we bought just didn't last and we didn't have money to get more. Never  Aarti Mena LPN 8/2/2021 9:30 AM    Initial /78 (BP Location: Right arm, Patient Position: Sitting, Cuff Size: Adult Regular)   Pulse 75   Temp 97.6  F (36.4  C) (Tympanic)   Resp 18   Ht 1.626 m (5' 4\")   Wt 71.1 kg (156 lb 12.8 oz)   LMP  (LMP Unknown)   SpO2 96%   Breastfeeding No   BMI 26.91 kg/m   Estimated body mass index is 26.91 kg/m  as calculated from the following:    Height as of this encounter: 1.626 m (5' 4\").    Weight as of this encounter: 71.1 kg (156 lb 12.8 oz).  Medication Reconciliation: complete    Aarti Mena LPN  "

## 2021-08-03 ASSESSMENT — PATIENT HEALTH QUESTIONNAIRE - PHQ9: SUM OF ALL RESPONSES TO PHQ QUESTIONS 1-9: 6

## 2021-08-03 ASSESSMENT — ANXIETY QUESTIONNAIRES: GAD7 TOTAL SCORE: 6

## 2021-09-10 ENCOUNTER — OFFICE VISIT (OUTPATIENT)
Dept: FAMILY MEDICINE | Facility: OTHER | Age: 56
End: 2021-09-10
Attending: FAMILY MEDICINE
Payer: COMMERCIAL

## 2021-09-10 ENCOUNTER — HOSPITAL ENCOUNTER (OUTPATIENT)
Dept: MAMMOGRAPHY | Facility: OTHER | Age: 56
End: 2021-09-10
Attending: FAMILY MEDICINE
Payer: COMMERCIAL

## 2021-09-10 VITALS
SYSTOLIC BLOOD PRESSURE: 110 MMHG | WEIGHT: 155 LBS | HEIGHT: 64 IN | HEART RATE: 80 BPM | DIASTOLIC BLOOD PRESSURE: 74 MMHG | RESPIRATION RATE: 16 BRPM | TEMPERATURE: 97.7 F | OXYGEN SATURATION: 96 % | BODY MASS INDEX: 26.46 KG/M2

## 2021-09-10 DIAGNOSIS — L98.9 SKIN LESION: ICD-10-CM

## 2021-09-10 DIAGNOSIS — E55.9 VITAMIN D DEFICIENCY: ICD-10-CM

## 2021-09-10 DIAGNOSIS — Z12.4 CERVICAL CANCER SCREENING: Primary | ICD-10-CM

## 2021-09-10 DIAGNOSIS — Z12.31 VISIT FOR SCREENING MAMMOGRAM: ICD-10-CM

## 2021-09-10 LAB
DEPRECATED CALCIDIOL+CALCIFEROL SERPL-MC: 39 UG/L (ref 30–100)
HOLD SPECIMEN: NORMAL

## 2021-09-10 PROCEDURE — G0123 SCREEN CERV/VAG THIN LAYER: HCPCS | Performed by: FAMILY MEDICINE

## 2021-09-10 PROCEDURE — 87624 HPV HI-RISK TYP POOLED RSLT: CPT | Mod: ZL | Performed by: FAMILY MEDICINE

## 2021-09-10 PROCEDURE — 36415 COLL VENOUS BLD VENIPUNCTURE: CPT | Mod: ZL | Performed by: FAMILY MEDICINE

## 2021-09-10 PROCEDURE — 99213 OFFICE O/P EST LOW 20 MIN: CPT | Performed by: FAMILY MEDICINE

## 2021-09-10 PROCEDURE — 77063 BREAST TOMOSYNTHESIS BI: CPT

## 2021-09-10 PROCEDURE — 82306 VITAMIN D 25 HYDROXY: CPT | Mod: ZL | Performed by: FAMILY MEDICINE

## 2021-09-10 ASSESSMENT — ENCOUNTER SYMPTOMS
SHORTNESS OF BREATH: 0
COUGH: 0
FEVER: 0
NERVOUS/ANXIOUS: 0
CHILLS: 0

## 2021-09-10 ASSESSMENT — ANXIETY QUESTIONNAIRES
1. FEELING NERVOUS, ANXIOUS, OR ON EDGE: MORE THAN HALF THE DAYS
2. NOT BEING ABLE TO STOP OR CONTROL WORRYING: MORE THAN HALF THE DAYS
7. FEELING AFRAID AS IF SOMETHING AWFUL MIGHT HAPPEN: MORE THAN HALF THE DAYS
6. BECOMING EASILY ANNOYED OR IRRITABLE: SEVERAL DAYS
GAD7 TOTAL SCORE: 10
8. IF YOU CHECKED OFF ANY PROBLEMS, HOW DIFFICULT HAVE THESE MADE IT FOR YOU TO DO YOUR WORK, TAKE CARE OF THINGS AT HOME, OR GET ALONG WITH OTHER PEOPLE?: SOMEWHAT DIFFICULT
GAD7 TOTAL SCORE: 10
4. TROUBLE RELAXING: SEVERAL DAYS
GAD7 TOTAL SCORE: 10
5. BEING SO RESTLESS THAT IT IS HARD TO SIT STILL: NOT AT ALL
3. WORRYING TOO MUCH ABOUT DIFFERENT THINGS: MORE THAN HALF THE DAYS
7. FEELING AFRAID AS IF SOMETHING AWFUL MIGHT HAPPEN: MORE THAN HALF THE DAYS

## 2021-09-10 ASSESSMENT — PATIENT HEALTH QUESTIONNAIRE - PHQ9
10. IF YOU CHECKED OFF ANY PROBLEMS, HOW DIFFICULT HAVE THESE PROBLEMS MADE IT FOR YOU TO DO YOUR WORK, TAKE CARE OF THINGS AT HOME, OR GET ALONG WITH OTHER PEOPLE: SOMEWHAT DIFFICULT
SUM OF ALL RESPONSES TO PHQ QUESTIONS 1-9: 8
SUM OF ALL RESPONSES TO PHQ QUESTIONS 1-9: 8

## 2021-09-10 ASSESSMENT — MIFFLIN-ST. JEOR: SCORE: 1278.08

## 2021-09-10 ASSESSMENT — PAIN SCALES - GENERAL: PAINLEVEL: NO PAIN (0)

## 2021-09-10 NOTE — PROGRESS NOTES
SUBJECTIVE:   Nursing Notes:   Ashly Winn LPN  9/10/2021  2:42 PM  Sign at exiting of workspace  Patient presents today for pap smear. Patient had her annual visit earlier this summer.    Medication Reconciliation Complete    Ashly Winn LPN  9/10/2021 2:40 PM      Joana Caicedo is a 56 year old female who presents to clinic today for a breast exam and Pap Smear.  She had her yearly physical on 8/2/2021.  Her last Pap Smear was on 9/23/16 and was normal at that time.  She had her mammogram today.     When she was here in June, she had a low vitamin D level of 18.7. It was recommended at that time that she take high-dose vitamin D, 50,000 international units weekly for 12 weeks. She is interested in having her level rechecked today.    Has a spot under her left lower lip that showed up at age 27, but has gotten bigger since then.  No bleeding or pain associated with it.    HPI    I personally reviewed medications/allergies/history listed below:    Patient Active Problem List    Diagnosis Date Noted     Hyperlipidemia 02/09/2018     Priority: Medium     Hypertension 02/09/2018     Priority: Medium     Insomnia 02/09/2018     Priority: Medium     Nicotine addiction 02/09/2018     Priority: Medium     Dysthymic disorder 09/02/2009     Priority: Medium     Past Medical History:   Diagnosis Date     Personal history of other medical treatment (CODE)     Demyelination , Per patient, has had mini strokes     Pure hyperglyceridemia     No Comments Provided      Past Surgical History:   Procedure Laterality Date     APPENDECTOMY OPEN      No Comments Provided     CERCLAGE CERVICAL      x2 with last 2 pregnancies.     LAPAROSCOPIC TUBAL LIGATION      No Comments Provided     Family History   Problem Relation Age of Onset     Heart Disease Father         Heart Disease     Other - See Comments Father          stage III COPD     Heart Disease Mother         Heart Disease     Other - See Comments Mother           kidney disease     Abdominal Aortic Aneurysm Mother         had AAA and thoracic aneurysm     Other - See Comments Other         High cholesterol runs in family     Diabetes Sister         Diabetes     Other - See Comments Sister          fibromyalgia, celiac disease.     Social History     Tobacco Use     Smoking status: Current Every Day Smoker     Packs/day: 1.00     Years: 31.00     Pack years: 31.00     Types: Cigarettes     Smokeless tobacco: Never Used   Substance Use Topics     Alcohol use: Yes     Comment: Alcoholic Drinks/day: 2 per month     Social History     Social History Narrative    Patient is a nursing assistant, currently working as a PCA.  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  She is  from her  and wants to be  but financially cannot afford that.  Her  is living with the family in an apartment.  He has a girlfriend and Joana has a boyfriend as well.     Current Outpatient Medications   Medication Sig Dispense Refill     albuterol (PROAIR HFA/PROVENTIL HFA/VENTOLIN HFA) 108 (90 Base) MCG/ACT inhaler Inhale 2 puffs into the lungs every 4 hours as needed for shortness of breath / dyspnea 18 g 11     amLODIPine (NORVASC) 5 MG tablet Take 1 tablet (5 mg) by mouth daily 90 tablet 3     aspirin EC 81 MG EC tablet Take 1 tablet by mouth daily       FLUoxetine (PROZAC) 40 MG capsule Take 1 capsule (40 mg) by mouth daily 90 capsule 3     hydrochlorothiazide (HYDRODIURIL) 25 MG tablet Take 1 tablet (25 mg) by mouth daily 90 tablet 3     lisinopril (ZESTRIL) 20 MG tablet Take 1 tablet (20 mg) by mouth daily 90 tablet 3     LORazepam (ATIVAN) 0.5 MG tablet Take 1 tablet (0.5 mg) by mouth every 6 hours 30 tablet 2     meclizine (ANTIVERT) 25 MG tablet Take 1 tablet (25 mg) by mouth 3 times daily as needed for dizziness 30 tablet 11     rosuvastatin (CRESTOR) 20 MG tablet Take 1 tablet (20 mg) by mouth daily 90 tablet 3  "    valACYclovir (VALTREX) 1000 mg tablet TAKE 2 TABLETS BY MOUTH 2 TIMES DAILY FOR ONE DAY, THEN TAKE 1 TABLET TWICE DAILY FOR 3 DAYS. MAY REPEAT FOR FUTURE OUTBREAKS RE OUTBREA 30 tablet 11     Allergies   Allergen Reactions     Amoxicillin Other (See Comments)     Yeast infection       Bupropion      Other reaction(s): Other - Describe In Comment Field  Facial and neck swelling     Niacin      Other reaction(s): Flushing     Sumatriptan      Other reaction(s): Other - Describe In Comment Field  \"burning veins\"       Review of Systems   Constitutional: Negative for chills and fever.   Respiratory: Negative for cough and shortness of breath.    Cardiovascular: Negative for peripheral edema.   Psychiatric/Behavioral: Negative for mood changes. The patient is not nervous/anxious.         OBJECTIVE:     /74   Pulse 80   Temp 97.7  F (36.5  C)   Resp 16   Ht 1.626 m (5' 4\")   Wt 70.3 kg (155 lb)   LMP  (LMP Unknown)   SpO2 96%   BMI 26.61 kg/m    Body mass index is 26.61 kg/m .  Physical Exam  Constitutional:       Appearance: Normal appearance.   Eyes:      Extraocular Movements: Extraocular movements intact.      Pupils: Pupils are equal, round, and reactive to light.   Pulmonary:      Comments: Breast exam declined by patient today.  Genitourinary:     Comments: Pelvic Exam:  Vulva: No external lesions, normal hair distribution, no adenopathy  Vagina: Moist, pink, no abnormal discharge, well rugated, no lesions  Cervix: Pap smear is taken, parous, smooth, pink, no visible lesions  Uterus: Normal size, anteverted, non-tender, mobile  Ovaries: No mass, non-tender, mobile  Skin:     Comments: Needs her lower lip on the left side there is a 3 mm raised, shiny, pink papule noted.   Neurological:      Mental Status: She is alert.   Psychiatric:         Mood and Affect: Mood normal.         Behavior: Behavior normal.           PHQ-9 SCORE 6/3/2021 8/2/2021 9/10/2021   PHQ-9 Total Score MyChart - 6 (Mild " depression) 8 (Mild depression)   PHQ-9 Total Score 8 6 8       PHQ-2 Score:     PHQ-2 ( 1999 [employer]) 8/21/2019 4/17/2019   Q1: Little interest or pleasure in doing things 0 0   Q2: Feeling down, depressed or hopeless 0 0   PHQ-2 Score 0 0       MAUDE-7 SCORE 6/3/2021 8/2/2021 9/10/2021   Total Score - 6 (mild anxiety) 10 (moderate anxiety)   Total Score 6 6 10         No flowsheet data found.      I personally reviewed results withpatient as listed below:   Diagnostic Test Results:  none     ASSESSMENT/PLAN:       ICD-10-CM    1. Cervical cancer screening  Z12.4 Pap Screen with HPV - recommended age 30 - 65 years   2. Vitamin D deficiency  E55.9 Vitamin D Total     Vitamin D Total   3. Skin lesion  L98.9 Adult General Surg Referral       1. Pap/HPV completed today as above.  2. Vitamin D level repeated today.  3. Referred to general surgery for duration of excision due to changes she has noticed.    Jaycee Ochoa MD  [facility] AND [facility]

## 2021-09-10 NOTE — LETTER
Joana Caicedo  [address] [place] [id number]    9/14/2021      Dear Ms. Caicedo,      I wanted to let you know that your Pap Smear and HPV test both returned normal.    Please contact us at [phone] with any questions or concerns that you have.        Sincerely,         Jaycee Ochoa MD MD

## 2021-09-10 NOTE — LETTER
Joana Caicedo  [address] [place] [id number]    9/10/2021      Dear Ms. Caicedo,      I wanted to let you know about your recent labs.  Your vitamin D level has improved.  I would recommend that you take over the counter vitamin D 5000 International Units daily year round once you have finished your course of the high dose vitamin D.  If you have questions, please call [phone].    Please contact us at [phone] with any questions or concerns that you have.    I have attached your lab results for your records.        Sincerely,         Jaycee Ohcoa MD     Resulted Orders   Vitamin D Total   Result Value Ref Range    Vitamin D, Total (25-Hydroxy) 39 30 - 100 ug/L      Comment:      Deficiency:          <10 ug/L  Insufficiency:       10-29 ug/L  Sufficiency:          ug/L  Possible Toxicity:   >100 ug/L     Extra Purple Top Tube   Result Value Ref Range    Hold Specimen JIC

## 2021-09-11 ASSESSMENT — ANXIETY QUESTIONNAIRES: GAD7 TOTAL SCORE: 10

## 2021-09-11 ASSESSMENT — PATIENT HEALTH QUESTIONNAIRE - PHQ9: SUM OF ALL RESPONSES TO PHQ QUESTIONS 1-9: 8

## 2021-09-14 LAB
BKR LAB AP GYN ADEQUACY: NORMAL
BKR LAB AP GYN INTERPRETATION: NORMAL
BKR LAB AP HPV REFLEX: NORMAL
BKR LAB AP LMP: NORMAL
BKR LAB AP PREVIOUS ABNORMAL: NORMAL
HUMAN PAPILLOMA VIRUS 16 DNA: NEGATIVE
HUMAN PAPILLOMA VIRUS 18 DNA: NEGATIVE
HUMAN PAPILLOMA VIRUS FINAL DIAGNOSIS: NORMAL
HUMAN PAPILLOMA VIRUS OTHER HR: NEGATIVE
PATH REPORT.RELEVANT HX SPEC: NORMAL

## 2021-09-16 ENCOUNTER — HOSPITAL ENCOUNTER (OUTPATIENT)
Dept: MRI IMAGING | Facility: OTHER | Age: 56
Discharge: HOME | End: 2021-09-16
Attending: FAMILY MEDICINE | Admitting: FAMILY MEDICINE
Payer: COMMERCIAL

## 2021-09-16 DIAGNOSIS — R51.9 NONINTRACTABLE HEADACHE, UNSPECIFIED CHRONICITY PATTERN, UNSPECIFIED HEADACHE TYPE: ICD-10-CM

## 2021-09-16 PROCEDURE — 70553 MRI BRAIN STEM W/O & W/DYE: CPT

## 2021-09-16 PROCEDURE — A9575 INJ GADOTERATE MEGLUMI 0.1ML: HCPCS | Performed by: FAMILY MEDICINE

## 2021-09-16 PROCEDURE — 255N000002 HC RX 255 OP 636: Performed by: FAMILY MEDICINE

## 2021-09-16 RX ADMIN — GADOTERATE MEGLUMINE 14 ML: 376.9 INJECTION INTRAVENOUS at 13:49

## 2021-10-05 ENCOUNTER — OFFICE VISIT (OUTPATIENT)
Dept: SURGERY | Facility: OTHER | Age: 56
End: 2021-10-05
Attending: FAMILY MEDICINE
Payer: COMMERCIAL

## 2021-10-05 VITALS
DIASTOLIC BLOOD PRESSURE: 80 MMHG | SYSTOLIC BLOOD PRESSURE: 134 MMHG | WEIGHT: 156 LBS | TEMPERATURE: 97.5 F | RESPIRATION RATE: 16 BRPM | OXYGEN SATURATION: 97 % | HEIGHT: 64 IN | BODY MASS INDEX: 26.63 KG/M2 | HEART RATE: 76 BPM

## 2021-10-05 DIAGNOSIS — L98.9 SKIN LESION: ICD-10-CM

## 2021-10-05 PROCEDURE — 88305 TISSUE EXAM BY PATHOLOGIST: CPT

## 2021-10-05 PROCEDURE — 11440 EXC FACE-MM B9+MARG 0.5 CM/<: CPT | Performed by: SURGERY

## 2021-10-05 ASSESSMENT — MIFFLIN-ST. JEOR: SCORE: 1282.61

## 2021-10-05 ASSESSMENT — PAIN SCALES - GENERAL: PAINLEVEL: SEVERE PAIN (6)

## 2021-10-05 NOTE — PROGRESS NOTES
Procedure Note      Pre/Post Operative Diagnosis:  Left lower lip skin lesion     Procedure:   Removal of  Left lower lip skin lesion     Surgeon: Kenn Drake MD    Local Anesthesia: 1% lidocaine with 0.25% Marcaine with epinephrine    Indication for the procedure:  This is a 56 year old female  patient referred by Jaycee Ochoa with a  Left lower lip skin lesion.       After explaining the risks to include bleeding, infection, recurrence or need for reexcision, and scarring the patientwished to proceed.    Procedure:   The area was prepped and draped in usual sterile fashion with ChloraPrep.   After, adequate local anesthesia, an 1 cm X 0.4 cm elliptical skin incision was made to encompass the  0.4 cm lesion.  The skin was closed with 4-0 Monocryl and Dermabond.      Plan:  The patient will be called to review the pathology. Patient will follow up if there any problems with the wound including redness or drainage.      Kenn Drake MD....................  10/5/2021   9:17 AM

## 2021-10-05 NOTE — NURSING NOTE
"Chief Complaint   Patient presents with     Procedure     skin lesion on left chin       Initial BP (!) 146/82 (BP Location: Right arm, Patient Position: Sitting, Cuff Size: Adult Regular)   Pulse 76   Temp 97.5  F (36.4  C) (Tympanic)   Resp 16   Ht 1.626 m (5' 4\")   Wt 70.8 kg (156 lb)   LMP  (LMP Unknown)   SpO2 97%   Breastfeeding No   BMI 26.78 kg/m   Estimated body mass index is 26.78 kg/m  as calculated from the following:    Height as of this encounter: 1.626 m (5' 4\").    Weight as of this encounter: 70.8 kg (156 lb).  Medication Reconciliation: Completed     Advanced Care Directive Reviewed    Prior to the start of the procedure and with procedural staff participation, I verbally confirmed the patient s identity using two indicators, relevant allergies, that the procedure was appropriate and matched the consent or emergent situation, and that the correct equipment/implants were available. Immediately prior to starting the procedure I conducted the Time Out with the procedural staff and re-confirmed the patient s name, procedure, and site/side. (The Joint Commission universal protocol was followed.)  Yes    Sedation (Moderate or Deep): None      Tori Tapia LPN  "

## 2021-10-08 LAB
PATH REPORT.COMMENTS IMP SPEC: NORMAL
PATH REPORT.FINAL DX SPEC: NORMAL
PHOTO IMAGE: NORMAL

## 2022-01-23 DIAGNOSIS — Z86.19 H/O COLD SORES: ICD-10-CM

## 2022-01-24 RX ORDER — VALACYCLOVIR HYDROCHLORIDE 1 G/1
TABLET, FILM COATED ORAL
Qty: 30 TABLET | Refills: 8 | Status: SHIPPED | OUTPATIENT
Start: 2022-01-24 | End: 2024-02-05

## 2022-01-24 NOTE — TELEPHONE ENCOUNTER
"Rx approved per Prescription Refill Protocol requirements. Barbara Ferris RN on 1/24/2022 at 2:53 PM    Last Prescription Date: 3/3/2020  Last Qty/Refills: 30 / 11  Last Office Visit: 9/10/2021 Don  Future Office Visit: none     Requested Prescriptions   Pending Prescriptions Disp Refills     valACYclovir (VALTREX) 1000 mg tablet [Pharmacy Med Name: VALACYCLOVIR 1G TABLET] 30 tablet 10     Sig: TAKE 2 TABLETS BY MOUTH 2 TIMES DAILY FOR ONE DAY, THEN TAKE 1 TABLET TWICE DAILY FOR 3 DAYS. MAY [facility]       Antivirals for Herpes Protocol Passed - 1/23/2022  1:32 PM        Passed - Patient is age 12 or older        Passed - Recent (12 mo) or future (30 days) visit within the authorizing provider's specialty     Patient has had an office visit with the authorizing provider or a provider within the authorizing providers department within the previous 12 mos or has a future within next 30 days. See \"Patient Info\" tab in inbasket, or \"Choose Columns\" in Meds & Orders section of the refill encounter.              Passed - Medication is active on med list        Passed - Normal serum creatinine on file in past 12 months     Recent Labs   Lab Test 06/11/21  0802   CR 0.72       Ok to refill medication if creatinine is low               "

## 2022-04-11 ENCOUNTER — HOSPITAL ENCOUNTER (EMERGENCY)
Facility: OTHER | Age: 57
Discharge: HOME | End: 2022-04-11
Attending: FAMILY MEDICINE | Admitting: FAMILY MEDICINE
Payer: COMMERCIAL

## 2022-04-11 ENCOUNTER — APPOINTMENT (OUTPATIENT)
Dept: ULTRASOUND IMAGING | Facility: OTHER | Age: 57
End: 2022-04-11
Attending: FAMILY MEDICINE
Payer: COMMERCIAL

## 2022-04-11 ENCOUNTER — APPOINTMENT (OUTPATIENT)
Dept: GENERAL RADIOLOGY | Facility: OTHER | Age: 57
End: 2022-04-11
Attending: FAMILY MEDICINE
Payer: COMMERCIAL

## 2022-04-11 ENCOUNTER — APPOINTMENT (OUTPATIENT)
Dept: CT IMAGING | Facility: OTHER | Age: 57
End: 2022-04-11
Attending: FAMILY MEDICINE
Payer: COMMERCIAL

## 2022-04-11 VITALS
WEIGHT: 156 LBS | OXYGEN SATURATION: 100 % | HEART RATE: 75 BPM | BODY MASS INDEX: 26.78 KG/M2 | TEMPERATURE: 98 F | DIASTOLIC BLOOD PRESSURE: 97 MMHG | RESPIRATION RATE: 18 BRPM | SYSTOLIC BLOOD PRESSURE: 146 MMHG

## 2022-04-11 DIAGNOSIS — K80.20 CALCULUS OF GALLBLADDER WITHOUT CHOLECYSTITIS WITHOUT OBSTRUCTION: ICD-10-CM

## 2022-04-11 LAB
ALBUMIN SERPL-MCNC: 4.2 G/DL (ref 3.5–5.7)
ALBUMIN UR-MCNC: NEGATIVE MG/DL
ALP SERPL-CCNC: 79 U/L (ref 34–104)
ALT SERPL W P-5'-P-CCNC: 21 U/L (ref 7–52)
ANION GAP SERPL CALCULATED.3IONS-SCNC: 8 MMOL/L (ref 3–14)
APPEARANCE UR: CLEAR
AST SERPL W P-5'-P-CCNC: 15 U/L (ref 13–39)
BASOPHILS # BLD AUTO: 0.1 10E3/UL (ref 0–0.2)
BASOPHILS NFR BLD AUTO: 1 %
BILIRUB SERPL-MCNC: 0.4 MG/DL (ref 0.3–1)
BILIRUB UR QL STRIP: NEGATIVE
BUN SERPL-MCNC: 8 MG/DL (ref 7–25)
CALCIUM SERPL-MCNC: 9.5 MG/DL (ref 8.6–10.3)
CHLORIDE BLD-SCNC: 104 MMOL/L (ref 98–107)
CO2 SERPL-SCNC: 25 MMOL/L (ref 21–31)
COLOR UR AUTO: YELLOW
CREAT SERPL-MCNC: 0.66 MG/DL (ref 0.6–1.2)
EOSINOPHIL # BLD AUTO: 0.1 10E3/UL (ref 0–0.7)
EOSINOPHIL NFR BLD AUTO: 1 %
ERYTHROCYTE [DISTWIDTH] IN BLOOD BY AUTOMATED COUNT: 12.5 % (ref 10–15)
GFR SERPL CREATININE-BSD FRML MDRD: >90 ML/MIN/1.73M2
GLUCOSE BLD-MCNC: 93 MG/DL (ref 70–105)
GLUCOSE UR STRIP-MCNC: NEGATIVE MG/DL
HCT VFR BLD AUTO: 45.2 % (ref 35–47)
HGB BLD-MCNC: 15.2 G/DL (ref 11.7–15.7)
HGB UR QL STRIP: NEGATIVE
IMM GRANULOCYTES # BLD: 0 10E3/UL
IMM GRANULOCYTES NFR BLD: 0 %
KETONES UR STRIP-MCNC: NEGATIVE MG/DL
LEUKOCYTE ESTERASE UR QL STRIP: NEGATIVE
LYMPHOCYTES # BLD AUTO: 1.9 10E3/UL (ref 0.8–5.3)
LYMPHOCYTES NFR BLD AUTO: 22 %
MCH RBC QN AUTO: 30 PG (ref 26.5–33)
MCHC RBC AUTO-ENTMCNC: 33.6 G/DL (ref 31.5–36.5)
MCV RBC AUTO: 89 FL (ref 78–100)
MONOCYTES # BLD AUTO: 0.5 10E3/UL (ref 0–1.3)
MONOCYTES NFR BLD AUTO: 6 %
NEUTROPHILS # BLD AUTO: 6.2 10E3/UL (ref 1.6–8.3)
NEUTROPHILS NFR BLD AUTO: 70 %
NITRATE UR QL: NEGATIVE
NRBC # BLD AUTO: 0 10E3/UL
NRBC BLD AUTO-RTO: 0 /100
PH UR STRIP: 7 [PH] (ref 5–9)
PLATELET # BLD AUTO: 287 10E3/UL (ref 150–450)
POTASSIUM BLD-SCNC: 3.6 MMOL/L (ref 3.5–5.1)
PROT SERPL-MCNC: 7.2 G/DL (ref 6.4–8.9)
RBC # BLD AUTO: 5.07 10E6/UL (ref 3.8–5.2)
SODIUM SERPL-SCNC: 137 MMOL/L (ref 134–144)
SP GR UR STRIP: 1.01 (ref 1–1.03)
UROBILINOGEN UR STRIP-MCNC: NORMAL MG/DL
WBC # BLD AUTO: 8.8 10E3/UL (ref 4–11)

## 2022-04-11 PROCEDURE — 250N000011 HC RX IP 250 OP 636: Performed by: FAMILY MEDICINE

## 2022-04-11 PROCEDURE — 81003 URINALYSIS AUTO W/O SCOPE: CPT | Performed by: FAMILY MEDICINE

## 2022-04-11 PROCEDURE — 99283 EMERGENCY DEPT VISIT LOW MDM: CPT | Performed by: FAMILY MEDICINE

## 2022-04-11 PROCEDURE — 80053 COMPREHEN METABOLIC PANEL: CPT | Performed by: FAMILY MEDICINE

## 2022-04-11 PROCEDURE — 71046 X-RAY EXAM CHEST 2 VIEWS: CPT

## 2022-04-11 PROCEDURE — 74177 CT ABD & PELVIS W/CONTRAST: CPT

## 2022-04-11 PROCEDURE — 85004 AUTOMATED DIFF WBC COUNT: CPT | Performed by: FAMILY MEDICINE

## 2022-04-11 PROCEDURE — 99285 EMERGENCY DEPT VISIT HI MDM: CPT | Mod: 25 | Performed by: FAMILY MEDICINE

## 2022-04-11 PROCEDURE — 76705 ECHO EXAM OF ABDOMEN: CPT

## 2022-04-11 RX ORDER — IOPAMIDOL 755 MG/ML
90 INJECTION, SOLUTION INTRAVASCULAR ONCE
Status: COMPLETED | OUTPATIENT
Start: 2022-04-11 | End: 2022-04-11

## 2022-04-11 RX ADMIN — IOPAMIDOL 90 ML: 755 INJECTION, SOLUTION INTRAVENOUS at 14:40

## 2022-04-11 ASSESSMENT — ENCOUNTER SYMPTOMS
SHORTNESS OF BREATH: 0
COUGH: 0
FEVER: 0
DIARRHEA: 1
ABDOMINAL DISTENTION: 1
ABDOMINAL PAIN: 1
APPETITE CHANGE: 1
NAUSEA: 0
CHILLS: 0
FATIGUE: 0
BACK PAIN: 0
CONSTIPATION: 0
ACTIVITY CHANGE: 1
FREQUENCY: 0
DIFFICULTY URINATING: 1
VOMITING: 0
FLANK PAIN: 1

## 2022-04-11 NOTE — ED PROVIDER NOTES
"  History     Chief Complaint   Patient presents with     Abdominal Pain     HPI  Joana Caicedo is a 57 year old female with history of hyperlipidemia, hypertension who   Presents to the ER with increasing right sided abdominal pain. She has been having loose stools for about two months and passing more gas. She notes that her stomach feels distended. Does not drink alcohol. Does not have her appendix. Still has her gallbladder. She has had associated nausea with heat flushes. She has not been seen for this issue previously.     Allergies:  Allergies   Allergen Reactions     Amoxicillin Other (See Comments)     Yeast infection       Bupropion      Other reaction(s): Other - Describe In Comment Field  Facial and neck swelling     Niacin      Other reaction(s): Flushing     Sumatriptan      Other reaction(s): Other - Describe In Comment Field  \"burning veins\"       Problem List:    Patient Active Problem List    Diagnosis Date Noted     Hyperlipidemia 02/09/2018     Priority: Medium     Hypertension 02/09/2018     Priority: Medium     Insomnia 02/09/2018     Priority: Medium     Nicotine addiction 02/09/2018     Priority: Medium     Dysthymic disorder 09/02/2009     Priority: Medium        Past Medical History:    Past Medical History:   Diagnosis Date     Personal history of other medical treatment (CODE)      Pure hyperglyceridemia        Past Surgical History:    Past Surgical History:   Procedure Laterality Date     APPENDECTOMY OPEN      No Comments Provided     CERCLAGE CERVICAL      x2 with last 2 pregnancies.     LAPAROSCOPIC TUBAL LIGATION      No Comments Provided       Family History:    Family History   Problem Relation Age of Onset     Heart Disease Father         Heart Disease     Other - See Comments Father          stage III COPD     Heart Disease Mother         Heart Disease     Other - See Comments Mother          kidney disease     Abdominal Aortic Aneurysm Mother         had AAA and thoracic " aneurysm     Other - See Comments Other         High cholesterol runs in family     Diabetes Sister         Diabetes     Other - See Comments Sister          fibromyalgia, celiac disease.       Social History:  Marital Status:   [2]  Social History     Tobacco Use     Smoking status: Current Every Day Smoker     Packs/day: 1.00     Years: 31.00     Pack years: 31.00     Types: Cigarettes     Smokeless tobacco: Never Used   Substance Use Topics     Alcohol use: Yes     Comment: Alcoholic Drinks/day: 2 per month     Drug use: No        Medications:    albuterol (PROAIR HFA/PROVENTIL HFA/VENTOLIN HFA) 108 (90 Base) MCG/ACT inhaler  amLODIPine (NORVASC) 5 MG tablet  aspirin EC 81 MG EC tablet  FLUoxetine (PROZAC) 40 MG capsule  hydrochlorothiazide (HYDRODIURIL) 25 MG tablet  lisinopril (ZESTRIL) 20 MG tablet  LORazepam (ATIVAN) 0.5 MG tablet  meclizine (ANTIVERT) 25 MG tablet  rosuvastatin (CRESTOR) 20 MG tablet  valACYclovir (VALTREX) 1000 mg tablet        Review of Systems   Constitutional: Positive for activity change and appetite change. Negative for chills, fatigue and fever.   Respiratory: Negative for cough and shortness of breath.    Gastrointestinal: Positive for abdominal distention, abdominal pain and diarrhea. Negative for constipation, nausea and vomiting.   Genitourinary: Positive for difficulty urinating and flank pain. Negative for frequency.   Musculoskeletal: Negative for back pain.       Physical Exam   BP: 128/75  Pulse: 87  Temp: 98  F (36.7  C)  Resp: 18  Weight: 70.8 kg (156 lb)  SpO2: 97 %      Physical Exam  Vitals and nursing note reviewed.   Constitutional:       Appearance: She is well-developed.   HENT:      Head: Normocephalic and atraumatic.   Cardiovascular:      Rate and Rhythm: Normal rate and regular rhythm.   Pulmonary:      Effort: Pulmonary effort is normal. No respiratory distress.      Breath sounds: Normal breath sounds. No stridor. No wheezing or rhonchi.   Abdominal:       General: There is distension. There are no signs of injury.      Palpations: Abdomen is soft. There is hepatomegaly.      Tenderness: There is generalized abdominal tenderness. There is guarding. There is no rebound. Negative signs include Childs's sign and Rovsing's sign.      Hernia: No hernia is present.      Comments: High pitched bowel sounds   Skin:     General: Skin is warm and dry.   Neurological:      Mental Status: She is alert.         ED Course   Patient seen and examined.  Labs and CT abdomen pelvis ordered.  Medical record reviewed.    Patient CT was suggestive of gallbladder stones.  She also had some opacity at the bases of both lungs.  Chest x-ray was done which does show some streaky abnormalities of the bottom both lungs but with no fever or elevated white cell count I did not feel that it was appropriate to put her on any antibiotics.  She will follow-up if she has worsening symptoms for that.    Cholelithiasis was seen on her ultrasound.  No cholecystitis is appreciated no bile biliary obstruction is noted.    Discussed the above test results with the patient.  She is comfortable with discharge at this time will follow up with surgery.  Did give her information regarding low-fat diet trying to avoid fatty foods as well as information regarding cholelithiasis and treatment for it.  Answered all questions the best of my ability.          Results for orders placed or performed during the hospital encounter of 04/11/22 (from the past 24 hour(s))   UA reflex to Microscopic   Result Value Ref Range    Color Urine Yellow Colorless, Straw, Light Yellow, Yellow    Appearance Urine Clear Clear    Glucose Urine Negative Negative mg/dL    Bilirubin Urine Negative Negative    Ketones Urine Negative Negative mg/dL    Specific Gravity Urine 1.006 1.000 - 1.030    Blood Urine Negative Negative    pH Urine 7.0 5.0 - 9.0    Protein Albumin Urine Negative Negative mg/dL    Urobilinogen Urine Normal Normal, 2.0  mg/dL    Nitrite Urine Negative Negative    Leukocyte Esterase Urine Negative Negative    Narrative    Microscopic not indicated   CBC with platelets differential    Narrative    The following orders were created for panel order CBC with platelets differential.  Procedure                               Abnormality         Status                     ---------                               -----------         ------                     CBC with platelets and d...[[id number]]                      Final result                 Please view results for these tests on the individual orders.   Comprehensive metabolic panel   Result Value Ref Range    Sodium 137 134 - 144 mmol/L    Potassium 3.6 3.5 - 5.1 mmol/L    Chloride 104 98 - 107 mmol/L    Carbon Dioxide (CO2) 25 21 - 31 mmol/L    Anion Gap 8 3 - 14 mmol/L    Urea Nitrogen 8 7 - 25 mg/dL    Creatinine 0.66 0.60 - 1.20 mg/dL    Calcium 9.5 8.6 - 10.3 mg/dL    Glucose 93 70 - 105 mg/dL    Alkaline Phosphatase 79 34 - 104 U/L    AST 15 13 - 39 U/L    ALT 21 7 - 52 U/L    Protein Total 7.2 6.4 - 8.9 g/dL    Albumin 4.2 3.5 - 5.7 g/dL    Bilirubin Total 0.4 0.3 - 1.0 mg/dL    GFR Estimate >90 >60 mL/min/1.73m2   CBC with platelets and differential   Result Value Ref Range    WBC Count 8.8 4.0 - 11.0 10e3/uL    RBC Count 5.07 3.80 - 5.20 10e6/uL    Hemoglobin 15.2 11.7 - 15.7 g/dL    Hematocrit 45.2 35.0 - 47.0 %    MCV 89 78 - 100 fL    MCH 30.0 26.5 - 33.0 pg    MCHC 33.6 31.5 - 36.5 g/dL    RDW 12.5 10.0 - 15.0 %    Platelet Count 287 150 - 450 10e3/uL    % Neutrophils 70 %    % Lymphocytes 22 %    % Monocytes 6 %    % Eosinophils 1 %    % Basophils 1 %    % Immature Granulocytes 0 %    NRBCs per 100 WBC 0 <1 /100    Absolute Neutrophils 6.2 1.6 - 8.3 10e3/uL    Absolute Lymphocytes 1.9 0.8 - 5.3 10e3/uL    Absolute Monocytes 0.5 0.0 - 1.3 10e3/uL    Absolute Eosinophils 0.1 0.0 - 0.7 10e3/uL    Absolute Basophils 0.1 0.0 - 0.2 10e3/uL    Absolute Immature Granulocytes 0.0  <=0.4 10e3/uL    Absolute NRBCs 0.0 10e3/uL   CT Abdomen Pelvis w Contrast    Narrative    CT ABDOMEN PELVIS W CONTRAST    CLINICAL HISTORY: Female, age 57 years,  Abdominal distension; Pain  right upper quadrant- more constant ? Liver disease;    Comparison:  None.    TECHNIQUE:  CT was performed of the abdomen and pelvis with IV  contrast. Sagittal, coronal, axial and MIP reconstructions were  reviewed.     FINDINGS:  There is groundglass opacification about the periphery of the lung  bases, more evident on the left.    Stomach and duodenum: Normal.    Liver: Normal.    Gallbladder: Radiodense gallstone. No CT evidence of cholecystitis.    Spleen: Normal.    Pancreas: Normal.    Adrenal glands: Normal.    Kidneys: Normal.    Ureters: Normal.    Urinary bladder: Normal.    Large and small bowel: Mild diverticulosis of the colon. No  diverticulitis.    Appendix: The appendix is not seen. There are no secondary signs of  appendicitis.    There is no evidence of pathologic lymph node enlargement. Scattered  areas of calcified noncalcified atherosclerotic plaque is seen within  the arterial structures of the abdomen/pelvis without evidence of  acute vascular abnormality.    The uterus is retroflexed without acute abnormality. Ovaries are  grossly normal.    Small fat-containing umbilical hernia. No acute abnormality.    Bony structures: Moderate facet joint degeneration in the lower lumbar  spine. No acute abnormality.      Impression    IMPRESSION:   Peripheral areas of atelectasis in the lung bases with suggestion of  subtle left lower lobe pneumonia.    No evidence of acute inflammatory process in the abdomen/pelvis.    Radiodense cholelithiasis. No CT evidence of cholecystitis. If concern  exists, ultrasound evaluation would better characterize the  gallbladder and biliary tree.    Normal appearance of the liver.    CHALINO MAK MD         SYSTEM ID:  [id number]   XR Chest 2 Views    Narrative    Exam:  XR CHEST  2 VW    HISTORY: opacity on CT abd-at bases..    COMPARISON:  4/11/2022, 9/8/2018, 5/24/2017    FINDINGS:     The cardiomediastinal contours are normal.      There is minimal left basilar streaky opacity, better visualized on  the same day CT of the abdomen and pelvis. No pleural effusion or  pneumothorax.      No acute osseous abnormality.       Impression    IMPRESSION:      Minimal left basilar streaky opacity may be due to pneumonia in the  appropriate clinical setting.      SONDRA DEL CASTILLO MD         SYSTEM ID:  [id number]   US Abdomen Limited    Narrative    PROCEDURES: US ABDOMEN LIMITED    HISTORY: Female, age 57 years, RUQ pain- > Gall stones on US    TECHNIQUE: Ultrasound of the upper abdomen was performed.    COMPARISON: CT scan abdomen and pelvis 4/11/2022     FINDINGS:    LIVER: Normal.    GALLBLADDER: Echogenic intraluminal stones. Echogenic foci also seen  within the gallbladder wall. No evidence of sonographic Childs's sign  or wall thickening.    Common bile duct diameter: 2.6 mm.    ABDOMINAL AORTA AND IVC: The visualized portions of the abdominal  aorta are unremarkable.    PANCREAS:The visualized portions of the pancreas are normal.    RIGHT KIDNEY: The right kidney is normal. The right kidney measures  centimeters in length.    OTHER: There is a questionable 1.6 cm peripherally echogenic lesion  located posteriorly in the liver. No apparent abnormality is seen  within the liver on the CT scan from the same date. This finding is  felt to more likely represent portion of the right adrenal gland.      Impression    IMPRESSION:   Cholelithiasis/cholesterolosis without evidence of acute abnormality.    No evidence of cholecystitis. No evidence of biliary  obstruction/dilatation.    CHALINO MAK MD         SYSTEM ID:  [id number]       Medications   iopamidol (ISOVUE-370) solution 90 mL (90 mLs Intravenous Given 4/11/22 1440)       Assessments & Plan (with Medical Decision Making)     I have reviewed  the nursing notes.    I have reviewed the findings, diagnosis, plan and need for follow up with the patient.    New Prescriptions    No medications on file       Final diagnoses:   Calculus of gallbladder without cholecystitis without obstruction       4/11/2022   [facility]  04/11/22 [id number]

## 2022-04-11 NOTE — DISCHARGE INSTRUCTIONS
1) Avoid fatty foods as much as possible.   2) Followup appointment with surgery.   3) Return here if worsening symptoms, fever or chills.

## 2022-04-11 NOTE — ED TRIAGE NOTES
"ED Nursing Triage Note (General)   ________________________________    Joana Caicedo is a 57 year old Female that presents to triage via private vehicle with nausea and abdominal pain.  Patient states she has been taking medication called \"nausean\", at home to help with symptoms and states little relief.  Patient states abdominal pain has been intermittent for the past two month on the R) side of her abdomen and is associated with heat flash and nausea.  Patient states she has not been seen for this issue.  Patient is now presenting to the ER due to episodes becoming more frequent.   Significant symptoms had onset 1 month ago.  Patient appears alert behavior.  GCS-15  Airway: intact  Breathing noted as Normal  Action taken:3      PRE HOSPITAL PRIOR LIVING SITUATION-home  "

## 2022-04-19 ENCOUNTER — OFFICE VISIT (OUTPATIENT)
Dept: SURGERY | Facility: OTHER | Age: 57
End: 2022-04-19
Attending: SURGERY
Payer: COMMERCIAL

## 2022-04-19 VITALS
HEART RATE: 87 BPM | WEIGHT: 150 LBS | HEIGHT: 64 IN | SYSTOLIC BLOOD PRESSURE: 128 MMHG | DIASTOLIC BLOOD PRESSURE: 80 MMHG | BODY MASS INDEX: 25.61 KG/M2 | TEMPERATURE: 97.5 F | RESPIRATION RATE: 16 BRPM | OXYGEN SATURATION: 97 %

## 2022-04-19 DIAGNOSIS — R19.7 DIARRHEA, UNSPECIFIED TYPE: ICD-10-CM

## 2022-04-19 DIAGNOSIS — K80.10 CHRONIC CHOLECYSTITIS WITH CALCULUS: Primary | ICD-10-CM

## 2022-04-19 PROCEDURE — 99203 OFFICE O/P NEW LOW 30 MIN: CPT | Performed by: SURGERY

## 2022-04-19 RX ORDER — CEFAZOLIN SODIUM 2 G/100ML
2 INJECTION, SOLUTION INTRAVENOUS SEE ADMIN INSTRUCTIONS
Status: CANCELLED | OUTPATIENT
Start: 2022-04-19

## 2022-04-19 RX ORDER — CEFAZOLIN SODIUM 2 G/100ML
2 INJECTION, SOLUTION INTRAVENOUS
Status: CANCELLED | OUTPATIENT
Start: 2022-04-19

## 2022-04-19 RX ORDER — ACETAMINOPHEN 325 MG/1
975 TABLET ORAL ONCE
Status: CANCELLED | OUTPATIENT
Start: 2022-04-19 | End: 2022-04-19

## 2022-04-19 ASSESSMENT — PAIN SCALES - GENERAL: PAINLEVEL: MODERATE PAIN (5)

## 2022-04-19 NOTE — NURSING NOTE
"Chief Complaint   Patient presents with     Consult For     Follow up ED for right upper quadrant pain       Initial /80 (BP Location: Right arm, Patient Position: Sitting, Cuff Size: Adult Large)   Pulse 87   Temp 97.5  F (36.4  C) (Tympanic)   Resp 16   Ht 1.626 m (5' 4\")   Wt 68 kg (150 lb)   LMP  (LMP Unknown)   SpO2 97%   Breastfeeding No   BMI 25.75 kg/m   Estimated body mass index is 25.75 kg/m  as calculated from the following:    Height as of this encounter: 1.626 m (5' 4\").    Weight as of this encounter: 68 kg (150 lb).  Medication Reconciliation: complete    Aarti Mena LPN  "

## 2022-04-19 NOTE — PROGRESS NOTES
Surgical Clinic Consult  Referring physician:  LINDY Cook  Primary physician:     Jaycee Ocoha    Chief complaint:   RUQ pain    History of present illness:  This is a 57 year old female I am seeing in consultation for right upper quadrant abdominal pain.  The patient was in the emergency room with right upper quadrant abdominal pain.  A CT scan showed gallstones.  Ultrasound confirmed gallstones, no wall thickening and a normal size common duct.  CBC and liver functions are within normal limits.  The patient reports a month or so history of right upper quadrant abdominal pain that radiates around to the flank.  Most anything she eats makes the pain worse.  There is associated nausea.  The patient also has complaints of diarrhea.  Has never had colonoscopy.  No history of jaundice, dark urine or light-colored stools.     Past medical history:   Past Medical History:   Diagnosis Date     Personal history of other medical treatment (CODE)     Demyelination , Per patient, has had mini strokes     Pure hyperglyceridemia     No Comments Provided       Pastsurgical history:  Past Surgical History:   Procedure Laterality Date     APPENDECTOMY OPEN      No Comments Provided     CERCLAGE CERVICAL      x2 with last 2 pregnancies.     LAPAROSCOPIC TUBAL LIGATION      No Comments Provided       Current medications:  Current Outpatient Medications   Medication Sig Dispense Refill     albuterol (PROAIR HFA/PROVENTIL HFA/VENTOLIN HFA) 108 (90 Base) MCG/ACT inhaler Inhale 2 puffs into the lungs every 4 hours as needed for shortness of breath / dyspnea 18 g 11     amLODIPine (NORVASC) 5 MG tablet Take 1 tablet (5 mg) by mouth daily 90 tablet 3     aspirin EC 81 MG EC tablet Take 1 tablet by mouth daily       FLUoxetine (PROZAC) 40 MG capsule Take 1 capsule (40 mg) by mouth daily 90 capsule 3     hydrochlorothiazide (HYDRODIURIL) 25 MG tablet Take 1 tablet (25 mg) by mouth daily 90 tablet 3     lisinopril (ZESTRIL) 20 MG  "tablet Take 1 tablet (20 mg) by mouth daily 90 tablet 3     LORazepam (ATIVAN) 0.5 MG tablet Take 1 tablet (0.5 mg) by mouth every 6 hours 30 tablet 2     meclizine (ANTIVERT) 25 MG tablet Take 1 tablet (25 mg) by mouth 3 times daily as needed for dizziness 30 tablet 11     rosuvastatin (CRESTOR) 20 MG tablet Take 1 tablet (20 mg) by mouth daily 90 tablet 3     valACYclovir (VALTREX) 1000 mg tablet TAKE 2 TABLETS BY MOUTH 2 TIMES DAILY FOR ONE DAY, THEN TAKE 1 TABLET TWICE DAILY FOR 3 DAYS. MAY REPEAT FOR FUTURE OUTBREAKSRE OUTBREA 30 tablet 8       Allergies:  Allergies   Allergen Reactions     Amoxicillin Other (See Comments)     Yeast infection       Bupropion      Other reaction(s): Other - Describe In Comment Field  Facial and neck swelling     Niacin      Other reaction(s): Flushing     Sumatriptan      Other reaction(s): Other - Describe In Comment Field  \"burning veins\"       Family history:  Family History   Problem Relation Age of Onset     Heart Disease Father         Heart Disease     Other - See Comments Father          stage III COPD     Heart Disease Mother         Heart Disease     Other - See Comments Mother          kidney disease     Abdominal Aortic Aneurysm Mother         had AAA and thoracic aneurysm     Other - See Comments Other         High cholesterol runs in family     Diabetes Sister         Diabetes     Other - See Comments Sister          fibromyalgia, celiac disease.       Social history:  Social History     Socioeconomic History     Marital status:      Spouse name: Not on file     Number of children: Not on file     Years of education: Not on file     Highest education level: Not on file   Occupational History     Not on file   Tobacco Use     Smoking status: Current Every Day Smoker     Packs/day: 1.00     Years: 31.00     Pack years: 31.00     Types: Cigarettes     Smokeless tobacco: Never Used   Substance and Sexual Activity     Alcohol use: Yes     Comment: Alcoholic " "Drinks/day: 2 per month     Drug use: No     Sexual activity: Yes     Partners: Male     Birth control/protection: Surgical   Other Topics Concern     Parent/sibling w/ CABG, MI or angioplasty before 65F 55M? 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  She is  from her  and wants to be  but financially cannot afford that.  Her  is living with the family in an apartment.  He has a girlfriend and Joana has a boyfriend as well.     Social Determinants of Health     Financial Resource Strain: Not on file   Food Insecurity: Not on file   Transportation Needs: Not on file   Physical Activity: Not on file   Stress: Not on file   Social Connections: Not on file   Intimate Partner Violence: Not on file   Housing Stability: Not on file       PROBLEM LIST:  Patient Active Problem List   Diagnosis     Dysthymic disorder     Hyperlipidemia     Hypertension     Insomnia     Nicotine addiction     Diarrhea     Chronic cholecystitis with calculus     Review of systems:  COMPLETE REVIEW OF SYSTEMS: Fevers  Gastrointestinal: See HPI  Cardiovascular: Denies problems  Respiratory: Denies problems  Genitourinary: Denies problems  Musculoskeletal: Joint pain  Skin: Denies problems  Neurologic: Headaches  Psychiatric: Anxiety  Endocrine: Denies problems  Heme/Lymphatic: Denies problems  Vascular: Denies problems      Physical exam: /80 (BP Location: Right arm, Patient Position: Sitting, Cuff Size: Adult Large)   Pulse 87   Temp 97.5  F (36.4  C) (Tympanic)   Resp 16   Ht 1.626 m (5' 4\")   Wt 68 kg (150 lb)   LMP  (LMP Unknown)   SpO2 97%   Breastfeeding No   BMI 25.75 kg/m        General: this is a pleasant female patient in no acute distress.  Patient is awake alert and oriented x3 .   Respiratory: Clear  Cardiovascular: Regular rate and " rhythm  Abdominal: Right lower quadrant scar.  Infraumbilical scar.  Soft and nontender with some discomfort in the right upper quadrant to palpation.    Assessment:   Chronic cholecystitis/cholelithiasis  2.  Diarrhea.  I recommended colonoscopy.  She will think about it.     Plan:    Laparoscopic cholecystectomy under general anesthesia as a day surgery.  Risks of bleeding, infection, potential injury to bowel or bile duct, possible open procedure discussed and wishes to proceed.       Leonard Francois MD FACS

## 2022-04-19 NOTE — H&P (VIEW-ONLY)
Surgical Clinic Consult  Referring physician:  LINDY Cook  Primary physician:     Jaycee Ochoa    Chief complaint:   RUQ pain    History of present illness:  This is a 57 year old female I am seeing in consultation for right upper quadrant abdominal pain.  The patient was in the emergency room with right upper quadrant abdominal pain.  A CT scan showed gallstones.  Ultrasound confirmed gallstones, no wall thickening and a normal size common duct.  CBC and liver functions are within normal limits.  The patient reports a month or so history of right upper quadrant abdominal pain that radiates around to the flank.  Most anything she eats makes the pain worse.  There is associated nausea.  The patient also has complaints of diarrhea.  Has never had colonoscopy.  No history of jaundice, dark urine or light-colored stools.     Past medical history:   Past Medical History:   Diagnosis Date     Personal history of other medical treatment (CODE)     Demyelination , Per patient, has had mini strokes     Pure hyperglyceridemia     No Comments Provided       Pastsurgical history:  Past Surgical History:   Procedure Laterality Date     APPENDECTOMY OPEN      No Comments Provided     CERCLAGE CERVICAL      x2 with last 2 pregnancies.     LAPAROSCOPIC TUBAL LIGATION      No Comments Provided       Current medications:  Current Outpatient Medications   Medication Sig Dispense Refill     albuterol (PROAIR HFA/PROVENTIL HFA/VENTOLIN HFA) 108 (90 Base) MCG/ACT inhaler Inhale 2 puffs into the lungs every 4 hours as needed for shortness of breath / dyspnea 18 g 11     amLODIPine (NORVASC) 5 MG tablet Take 1 tablet (5 mg) by mouth daily 90 tablet 3     aspirin EC 81 MG EC tablet Take 1 tablet by mouth daily       FLUoxetine (PROZAC) 40 MG capsule Take 1 capsule (40 mg) by mouth daily 90 capsule 3     hydrochlorothiazide (HYDRODIURIL) 25 MG tablet Take 1 tablet (25 mg) by mouth daily 90 tablet 3     lisinopril (ZESTRIL) 20 MG  "tablet Take 1 tablet (20 mg) by mouth daily 90 tablet 3     LORazepam (ATIVAN) 0.5 MG tablet Take 1 tablet (0.5 mg) by mouth every 6 hours 30 tablet 2     meclizine (ANTIVERT) 25 MG tablet Take 1 tablet (25 mg) by mouth 3 times daily as needed for dizziness 30 tablet 11     rosuvastatin (CRESTOR) 20 MG tablet Take 1 tablet (20 mg) by mouth daily 90 tablet 3     valACYclovir (VALTREX) 1000 mg tablet TAKE 2 TABLETS BY MOUTH 2 TIMES DAILY FOR ONE DAY, THEN TAKE 1 TABLET TWICE DAILY FOR 3 DAYS. MAY REPEAT FOR FUTURE OUTBREAKSRE OUTBREA 30 tablet 8       Allergies:  Allergies   Allergen Reactions     Amoxicillin Other (See Comments)     Yeast infection       Bupropion      Other reaction(s): Other - Describe In Comment Field  Facial and neck swelling     Niacin      Other reaction(s): Flushing     Sumatriptan      Other reaction(s): Other - Describe In Comment Field  \"burning veins\"       Family history:  Family History   Problem Relation Age of Onset     Heart Disease Father         Heart Disease     Other - See Comments Father          stage III COPD     Heart Disease Mother         Heart Disease     Other - See Comments Mother          kidney disease     Abdominal Aortic Aneurysm Mother         had AAA and thoracic aneurysm     Other - See Comments Other         High cholesterol runs in family     Diabetes Sister         Diabetes     Other - See Comments Sister          fibromyalgia, celiac disease.       Social history:  Social History     Socioeconomic History     Marital status:      Spouse name: Not on file     Number of children: Not on file     Years of education: Not on file     Highest education level: Not on file   Occupational History     Not on file   Tobacco Use     Smoking status: Current Every Day Smoker     Packs/day: 1.00     Years: 31.00     Pack years: 31.00     Types: Cigarettes     Smokeless tobacco: Never Used   Substance and Sexual Activity     Alcohol use: Yes     Comment: Alcoholic " "Drinks/day: 2 per month     Drug use: No     Sexual activity: Yes     Partners: Male     Birth control/protection: Surgical   Other Topics Concern     Parent/sibling w/ CABG, MI or angioplasty before 65F 55M? 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  She is  from her  and wants to be  but financially cannot afford that.  Her  is living with the family in an apartment.  He has a girlfriend and Joana has a boyfriend as well.     Social Determinants of Health     Financial Resource Strain: Not on file   Food Insecurity: Not on file   Transportation Needs: Not on file   Physical Activity: Not on file   Stress: Not on file   Social Connections: Not on file   Intimate Partner Violence: Not on file   Housing Stability: Not on file       PROBLEM LIST:  Patient Active Problem List   Diagnosis     Dysthymic disorder     Hyperlipidemia     Hypertension     Insomnia     Nicotine addiction     Diarrhea     Chronic cholecystitis with calculus     Review of systems:  COMPLETE REVIEW OF SYSTEMS: Fevers  Gastrointestinal: See HPI  Cardiovascular: Denies problems  Respiratory: Denies problems  Genitourinary: Denies problems  Musculoskeletal: Joint pain  Skin: Denies problems  Neurologic: Headaches  Psychiatric: Anxiety  Endocrine: Denies problems  Heme/Lymphatic: Denies problems  Vascular: Denies problems      Physical exam: /80 (BP Location: Right arm, Patient Position: Sitting, Cuff Size: Adult Large)   Pulse 87   Temp 97.5  F (36.4  C) (Tympanic)   Resp 16   Ht 1.626 m (5' 4\")   Wt 68 kg (150 lb)   LMP  (LMP Unknown)   SpO2 97%   Breastfeeding No   BMI 25.75 kg/m        General: this is a pleasant female patient in no acute distress.  Patient is awake alert and oriented x3 .   Respiratory: Clear  Cardiovascular: Regular rate and " rhythm  Abdominal: Right lower quadrant scar.  Infraumbilical scar.  Soft and nontender with some discomfort in the right upper quadrant to palpation.    Assessment:   Chronic cholecystitis/cholelithiasis  2.  Diarrhea.  I recommended colonoscopy.  She will think about it.     Plan:    Laparoscopic cholecystectomy under general anesthesia as a day surgery.  Risks of bleeding, infection, potential injury to bowel or bile duct, possible open procedure discussed and wishes to proceed.       Leonard Francois MD FACS

## 2022-04-25 ENCOUNTER — ALLIED HEALTH/NURSE VISIT (OUTPATIENT)
Dept: FAMILY MEDICINE | Facility: OTHER | Age: 57
End: 2022-04-25
Attending: SURGERY
Payer: COMMERCIAL

## 2022-04-25 DIAGNOSIS — K80.10 CHRONIC CHOLECYSTITIS WITH CALCULUS: ICD-10-CM

## 2022-04-25 LAB — SARS-COV-2 RNA RESP QL NAA+PROBE: NEGATIVE

## 2022-04-25 PROCEDURE — C9803 HOPD COVID-19 SPEC COLLECT: HCPCS

## 2022-04-25 PROCEDURE — U0005 INFEC AGEN DETEC AMPLI PROBE: HCPCS | Mod: ZL

## 2022-04-25 RX ORDER — FAMOTIDINE 20 MG
3000 TABLET ORAL
COMMUNITY

## 2022-04-25 NOTE — PROGRESS NOTES
Patient here today for Covid test.     Brandi Dill CNA .............................on 4/25/2022 at 9:29 AM

## 2022-04-27 ENCOUNTER — ANESTHESIA EVENT (OUTPATIENT)
Dept: SURGERY | Facility: OTHER | Age: 57
End: 2022-04-27
Payer: COMMERCIAL

## 2022-04-27 DIAGNOSIS — T88.4XXA HARD TO INTUBATE, INITIAL ENCOUNTER: Primary | ICD-10-CM

## 2022-04-28 ENCOUNTER — ANESTHESIA (OUTPATIENT)
Dept: SURGERY | Facility: OTHER | Age: 57
End: 2022-04-28
Payer: COMMERCIAL

## 2022-04-28 ENCOUNTER — HOSPITAL ENCOUNTER (OUTPATIENT)
Facility: OTHER | Age: 57
Discharge: HOME | End: 2022-04-28
Attending: SURGERY | Admitting: SURGERY
Payer: COMMERCIAL

## 2022-04-28 VITALS
DIASTOLIC BLOOD PRESSURE: 76 MMHG | TEMPERATURE: 97.1 F | OXYGEN SATURATION: 97 % | RESPIRATION RATE: 13 BRPM | WEIGHT: 150 LBS | HEIGHT: 64 IN | SYSTOLIC BLOOD PRESSURE: 103 MMHG | HEART RATE: 83 BPM | BODY MASS INDEX: 25.61 KG/M2

## 2022-04-28 DIAGNOSIS — K80.10 CHRONIC CHOLECYSTITIS WITH CALCULUS: Primary | ICD-10-CM

## 2022-04-28 LAB — GLUCOSE BLDC GLUCOMTR-MCNC: 108 MG/DL (ref 70–99)

## 2022-04-28 PROCEDURE — 250N000011 HC RX IP 250 OP 636: Performed by: NURSE ANESTHETIST, CERTIFIED REGISTERED

## 2022-04-28 PROCEDURE — 250N000026 HC DESFLURANE, PER MIN: Performed by: SURGERY

## 2022-04-28 PROCEDURE — 258N000001 HC RX 258: Performed by: SURGERY

## 2022-04-28 PROCEDURE — 710N000010 HC RECOVERY PHASE 1, LEVEL 2, PER MIN: Performed by: SURGERY

## 2022-04-28 PROCEDURE — 88304 TISSUE EXAM BY PATHOLOGIST: CPT

## 2022-04-28 PROCEDURE — 250N000013 HC RX MED GY IP 250 OP 250 PS 637: Performed by: NURSE ANESTHETIST, CERTIFIED REGISTERED

## 2022-04-28 PROCEDURE — 250N000013 HC RX MED GY IP 250 OP 250 PS 637: Performed by: SURGERY

## 2022-04-28 PROCEDURE — 250N000011 HC RX IP 250 OP 636: Performed by: SURGERY

## 2022-04-28 PROCEDURE — 258N000003 HC RX IP 258 OP 636: Performed by: NURSE ANESTHETIST, CERTIFIED REGISTERED

## 2022-04-28 PROCEDURE — 82962 GLUCOSE BLOOD TEST: CPT

## 2022-04-28 PROCEDURE — 272N000001 HC OR GENERAL SUPPLY STERILE: Performed by: SURGERY

## 2022-04-28 PROCEDURE — 47562 LAPAROSCOPIC CHOLECYSTECTOMY: CPT | Performed by: NURSE ANESTHETIST, CERTIFIED REGISTERED

## 2022-04-28 PROCEDURE — 710N000012 HC RECOVERY PHASE 2, PER MINUTE: Performed by: SURGERY

## 2022-04-28 PROCEDURE — 999N000141 HC STATISTIC PRE-PROCEDURE NURSING ASSESSMENT: Performed by: SURGERY

## 2022-04-28 PROCEDURE — 47562 LAPAROSCOPIC CHOLECYSTECTOMY: CPT | Performed by: SURGERY

## 2022-04-28 PROCEDURE — 370N000017 HC ANESTHESIA TECHNICAL FEE, PER MIN: Performed by: SURGERY

## 2022-04-28 PROCEDURE — 360N000076 HC SURGERY LEVEL 3, PER MIN: Performed by: SURGERY

## 2022-04-28 PROCEDURE — 250N000025 HC SEVOFLURANE, PER MIN: Performed by: SURGERY

## 2022-04-28 PROCEDURE — 250N000009 HC RX 250: Performed by: NURSE ANESTHETIST, CERTIFIED REGISTERED

## 2022-04-28 RX ORDER — FENTANYL CITRATE 50 UG/ML
50 INJECTION, SOLUTION INTRAMUSCULAR; INTRAVENOUS EVERY 5 MIN PRN
Status: DISCONTINUED | OUTPATIENT
Start: 2022-04-28 | End: 2022-04-28 | Stop reason: HOSPADM

## 2022-04-28 RX ORDER — NALOXONE HYDROCHLORIDE 0.4 MG/ML
0.4 INJECTION, SOLUTION INTRAMUSCULAR; INTRAVENOUS; SUBCUTANEOUS
Status: DISCONTINUED | OUTPATIENT
Start: 2022-04-28 | End: 2022-04-28 | Stop reason: HOSPADM

## 2022-04-28 RX ORDER — LIDOCAINE 40 MG/G
CREAM TOPICAL
Status: DISCONTINUED | OUTPATIENT
Start: 2022-04-28 | End: 2022-04-28 | Stop reason: HOSPADM

## 2022-04-28 RX ORDER — BUPIVACAINE HYDROCHLORIDE 2.5 MG/ML
INJECTION, SOLUTION INFILTRATION; PERINEURAL PRN
Status: DISCONTINUED | OUTPATIENT
Start: 2022-04-28 | End: 2022-04-28 | Stop reason: HOSPADM

## 2022-04-28 RX ORDER — OXYCODONE HYDROCHLORIDE 5 MG/1
5 TABLET ORAL EVERY 4 HOURS PRN
Status: DISCONTINUED | OUTPATIENT
Start: 2022-04-28 | End: 2022-04-28 | Stop reason: HOSPADM

## 2022-04-28 RX ORDER — DIPHENHYDRAMINE HYDROCHLORIDE 50 MG/ML
25 INJECTION INTRAMUSCULAR; INTRAVENOUS ONCE
Status: COMPLETED | OUTPATIENT
Start: 2022-04-28 | End: 2022-04-28

## 2022-04-28 RX ORDER — OXYCODONE AND ACETAMINOPHEN 5; 325 MG/1; MG/1
1 TABLET ORAL EVERY 6 HOURS PRN
Qty: 12 TABLET | Refills: 0 | Status: SHIPPED | OUTPATIENT
Start: 2022-04-28 | End: 2022-05-01

## 2022-04-28 RX ORDER — MEPERIDINE HYDROCHLORIDE 50 MG/ML
12.5 INJECTION INTRAMUSCULAR; INTRAVENOUS; SUBCUTANEOUS
Status: DISCONTINUED | OUTPATIENT
Start: 2022-04-28 | End: 2022-04-28 | Stop reason: HOSPADM

## 2022-04-28 RX ORDER — KETOROLAC TROMETHAMINE 30 MG/ML
INJECTION, SOLUTION INTRAMUSCULAR; INTRAVENOUS PRN
Status: DISCONTINUED | OUTPATIENT
Start: 2022-04-28 | End: 2022-04-28

## 2022-04-28 RX ORDER — GLYCOPYRROLATE 0.2 MG/ML
INJECTION, SOLUTION INTRAMUSCULAR; INTRAVENOUS PRN
Status: DISCONTINUED | OUTPATIENT
Start: 2022-04-28 | End: 2022-04-28

## 2022-04-28 RX ORDER — ACETAMINOPHEN 325 MG/1
650 TABLET ORAL 4 TIMES DAILY
Qty: 24 TABLET | Refills: 0 | Status: SHIPPED | OUTPATIENT
Start: 2022-04-28 | End: 2022-05-01

## 2022-04-28 RX ORDER — CEFAZOLIN SODIUM/WATER 2 G/20 ML
2 SYRINGE (ML) INTRAVENOUS SEE ADMIN INSTRUCTIONS
Status: DISCONTINUED | OUTPATIENT
Start: 2022-04-28 | End: 2022-04-28 | Stop reason: HOSPADM

## 2022-04-28 RX ORDER — CEFAZOLIN SODIUM/WATER 2 G/20 ML
2 SYRINGE (ML) INTRAVENOUS
Status: COMPLETED | OUTPATIENT
Start: 2022-04-28 | End: 2022-04-28

## 2022-04-28 RX ORDER — OXYCODONE HYDROCHLORIDE 5 MG/1
5 TABLET ORAL
Status: CANCELLED | OUTPATIENT
Start: 2022-04-28

## 2022-04-28 RX ORDER — SODIUM CHLORIDE, SODIUM LACTATE, POTASSIUM CHLORIDE, CALCIUM CHLORIDE 600; 310; 30; 20 MG/100ML; MG/100ML; MG/100ML; MG/100ML
INJECTION, SOLUTION INTRAVENOUS CONTINUOUS
Status: DISCONTINUED | OUTPATIENT
Start: 2022-04-28 | End: 2022-04-28 | Stop reason: HOSPADM

## 2022-04-28 RX ORDER — FENTANYL CITRATE 50 UG/ML
50 INJECTION, SOLUTION INTRAMUSCULAR; INTRAVENOUS
Status: DISCONTINUED | OUTPATIENT
Start: 2022-04-28 | End: 2022-04-28 | Stop reason: HOSPADM

## 2022-04-28 RX ORDER — ONDANSETRON 4 MG/1
4 TABLET, ORALLY DISINTEGRATING ORAL EVERY 30 MIN PRN
Status: DISCONTINUED | OUTPATIENT
Start: 2022-04-28 | End: 2022-04-28 | Stop reason: HOSPADM

## 2022-04-28 RX ORDER — ONDANSETRON 2 MG/ML
4 INJECTION INTRAMUSCULAR; INTRAVENOUS EVERY 30 MIN PRN
Status: DISCONTINUED | OUTPATIENT
Start: 2022-04-28 | End: 2022-04-28 | Stop reason: HOSPADM

## 2022-04-28 RX ORDER — ONDANSETRON 2 MG/ML
INJECTION INTRAMUSCULAR; INTRAVENOUS PRN
Status: DISCONTINUED | OUTPATIENT
Start: 2022-04-28 | End: 2022-04-28

## 2022-04-28 RX ORDER — NEOSTIGMINE METHYLSULFATE 1 MG/ML
VIAL (ML) INJECTION PRN
Status: DISCONTINUED | OUTPATIENT
Start: 2022-04-28 | End: 2022-04-28

## 2022-04-28 RX ORDER — HYDROMORPHONE HYDROCHLORIDE 1 MG/ML
0.4 INJECTION, SOLUTION INTRAMUSCULAR; INTRAVENOUS; SUBCUTANEOUS EVERY 5 MIN PRN
Status: DISCONTINUED | OUTPATIENT
Start: 2022-04-28 | End: 2022-04-28 | Stop reason: HOSPADM

## 2022-04-28 RX ORDER — DEXAMETHASONE SODIUM PHOSPHATE 4 MG/ML
INJECTION, SOLUTION INTRA-ARTICULAR; INTRALESIONAL; INTRAMUSCULAR; INTRAVENOUS; SOFT TISSUE PRN
Status: DISCONTINUED | OUTPATIENT
Start: 2022-04-28 | End: 2022-04-28

## 2022-04-28 RX ORDER — ACETAMINOPHEN 10 MG/ML
1000 INJECTION, SOLUTION INTRAVENOUS ONCE
Status: COMPLETED | OUTPATIENT
Start: 2022-04-28 | End: 2022-04-28

## 2022-04-28 RX ORDER — LIDOCAINE HYDROCHLORIDE 20 MG/ML
INJECTION, SOLUTION INFILTRATION; PERINEURAL PRN
Status: DISCONTINUED | OUTPATIENT
Start: 2022-04-28 | End: 2022-04-28

## 2022-04-28 RX ORDER — FENTANYL CITRATE 50 UG/ML
INJECTION, SOLUTION INTRAMUSCULAR; INTRAVENOUS PRN
Status: DISCONTINUED | OUTPATIENT
Start: 2022-04-28 | End: 2022-04-28

## 2022-04-28 RX ORDER — NALOXONE HYDROCHLORIDE 0.4 MG/ML
0.2 INJECTION, SOLUTION INTRAMUSCULAR; INTRAVENOUS; SUBCUTANEOUS
Status: DISCONTINUED | OUTPATIENT
Start: 2022-04-28 | End: 2022-04-28 | Stop reason: HOSPADM

## 2022-04-28 RX ORDER — PROPOFOL 10 MG/ML
INJECTION, EMULSION INTRAVENOUS PRN
Status: DISCONTINUED | OUTPATIENT
Start: 2022-04-28 | End: 2022-04-28

## 2022-04-28 RX ORDER — ACETAMINOPHEN 325 MG/1
975 TABLET ORAL ONCE
Status: COMPLETED | OUTPATIENT
Start: 2022-04-28 | End: 2022-04-28

## 2022-04-28 RX ORDER — IBUPROFEN 600 MG/1
600 TABLET, FILM COATED ORAL 4 TIMES DAILY
Qty: 12 TABLET | Refills: 0 | Status: SHIPPED | OUTPATIENT
Start: 2022-04-28 | End: 2022-05-01

## 2022-04-28 RX ADMIN — FENTANYL CITRATE 50 MCG: 50 INJECTION, SOLUTION INTRAMUSCULAR; INTRAVENOUS at 09:42

## 2022-04-28 RX ADMIN — ONDANSETRON HYDROCHLORIDE 4 MG: 2 SOLUTION INTRAMUSCULAR; INTRAVENOUS at 09:32

## 2022-04-28 RX ADMIN — FENTANYL CITRATE 50 MCG: 50 INJECTION INTRAMUSCULAR; INTRAVENOUS at 11:10

## 2022-04-28 RX ADMIN — DIPHENHYDRAMINE HYDROCHLORIDE 25 MG: 50 INJECTION INTRAMUSCULAR; INTRAVENOUS at 09:15

## 2022-04-28 RX ADMIN — DEXAMETHASONE SODIUM PHOSPHATE 4 MG: 4 INJECTION, SOLUTION INTRAMUSCULAR; INTRAVENOUS at 09:41

## 2022-04-28 RX ADMIN — ROCURONIUM BROMIDE 30 MG: 50 INJECTION, SOLUTION INTRAVENOUS at 09:32

## 2022-04-28 RX ADMIN — LIDOCAINE HYDROCHLORIDE 40 MG: 20 INJECTION, SOLUTION INFILTRATION; PERINEURAL at 09:32

## 2022-04-28 RX ADMIN — PROCHLORPERAZINE EDISYLATE 5 MG: 5 INJECTION INTRAMUSCULAR; INTRAVENOUS at 11:33

## 2022-04-28 RX ADMIN — ONDANSETRON 4 MG: 2 INJECTION INTRAMUSCULAR; INTRAVENOUS at 10:52

## 2022-04-28 RX ADMIN — NEOSTIGMINE METHYLSULFATE 3 MG: 1 INJECTION INTRAVENOUS at 10:15

## 2022-04-28 RX ADMIN — MIDAZOLAM HYDROCHLORIDE 2 MG: 1 INJECTION, SOLUTION INTRAMUSCULAR; INTRAVENOUS at 09:30

## 2022-04-28 RX ADMIN — PROPOFOL 170 MG: 10 INJECTION, EMULSION INTRAVENOUS at 09:32

## 2022-04-28 RX ADMIN — SODIUM CHLORIDE, POTASSIUM CHLORIDE, SODIUM LACTATE AND CALCIUM CHLORIDE 100 ML/HR: 600; 310; 30; 20 INJECTION, SOLUTION INTRAVENOUS at 07:54

## 2022-04-28 RX ADMIN — ACETAMINOPHEN 1000 MG: 10 INJECTION, SOLUTION INTRAVENOUS at 10:59

## 2022-04-28 RX ADMIN — ACETAMINOPHEN 975 MG: 325 TABLET ORAL at 07:45

## 2022-04-28 RX ADMIN — OXYCODONE HYDROCHLORIDE 5 MG: 5 TABLET ORAL at 13:33

## 2022-04-28 RX ADMIN — FENTANYL CITRATE 50 MCG: 50 INJECTION, SOLUTION INTRAMUSCULAR; INTRAVENOUS at 09:30

## 2022-04-28 RX ADMIN — KETOROLAC TROMETHAMINE 30 MG: 30 INJECTION, SOLUTION INTRAMUSCULAR at 10:14

## 2022-04-28 RX ADMIN — PROPOFOL 30 MG: 10 INJECTION, EMULSION INTRAVENOUS at 09:41

## 2022-04-28 RX ADMIN — HYDROMORPHONE HYDROCHLORIDE 0.4 MG: 1 INJECTION, SOLUTION INTRAMUSCULAR; INTRAVENOUS; SUBCUTANEOUS at 11:34

## 2022-04-28 RX ADMIN — GLYCOPYRROLATE 0.6 MG: 0.2 INJECTION INTRAMUSCULAR; INTRAVENOUS at 10:15

## 2022-04-28 RX ADMIN — FENTANYL CITRATE 50 MCG: 50 INJECTION INTRAMUSCULAR; INTRAVENOUS at 10:51

## 2022-04-28 RX ADMIN — Medication 2 G: at 08:56

## 2022-04-28 RX ADMIN — SODIUM CHLORIDE, POTASSIUM CHLORIDE, SODIUM LACTATE AND CALCIUM CHLORIDE: 600; 310; 30; 20 INJECTION, SOLUTION INTRAVENOUS at 10:52

## 2022-04-28 ASSESSMENT — LIFESTYLE VARIABLES: TOBACCO_USE: 1

## 2022-04-28 NOTE — PROGRESS NOTES
Pt reports nausea has resolved mostly and is at a very tolerable level. Told to encourage fluids at home. Tolerated food and water without worsening nausea. Pain is well controlled after pain pills. Steady on feet. IV removed. AVS reviewed with pt and daughter. Taken out by WC.

## 2022-04-28 NOTE — OP NOTE
PREOPERATIVE  DIAGNOSIS:  Chronic cholecystitis/cholelithiasis.       POSTOPERATIVE DIAGNOSIS:  Chroniccholecystitis/cholelithiasis.      PROCEDURE PERFORMED:  Laparoscopic cholecystectomy.    SURGEON:  Leonard Francois MD FACS    ASSISTANT: DARRELL Garcia    ANESTHESIA:  , [facility]    FAMILY PHYSICIAN: Jaycee Ochoa      INDICATION FOR THE PROCEDURE:  The patient is a 57 year old femalewith a  history of right upper quadrant abdominal pain radiating to the flank.  The patient's ultrasound shows gallstones, no wall thickening and normal CBD.  The liver functions were within normal limits.     PROCEDURE:  After adequate general anesthesia, the patient was prepped and draped in the usual sterile fashion.  A supraumbilical 5 mm incision was made and the abdomen entered using the Visiport technique.  The abdomen was insufflated with carbon dioxide to a pressure of 15 mmHg.  Under direct vision, an 12 mm epigastric and two 5 mm right-sided ports were placed.  The initial port site was inspected and was unremarkable.   The gallbladder was then grasped and held on traction.    Dissection was carried down to  Eduar s pouch .  The cystic duct was then able to be visualized.  That was dissected free circumferentially, triply clipped and divided.  Adjacent to that was the cystic artery and that was dissected free circumferentially, doubly clipped and divided.  The gallbladder was then taken out of the hepatic bed using the hook cautery and maintaining good hemostasis throughout.  There was  spillage of  bile .The gallbladder was placed in an plasticr pouch and removed through the epigastric port site without dilation.  There was no spillage.  The right upper quadrant was irrigated with a liter of normal saline.  There was good hemostasis.   The three 5 mm ports were removed under direct vision.  There was good hemostasis.   The abdomen was deflated and the epigastric port removed.  The epigastric fascial  defect was closed with an 0-Vicryl suture.  The wounds were infiltrated with 0.25 % Marcaine, the dermis approximated with 3-0 Vicryl sutures and the skin closed with 5-0 Maxon intracuticular suture.  Proxi-Strips and clean, dry dressings were applied.  The patient was taken to the recovery room in stable condition.      Leonard Francois MD FACS

## 2022-04-28 NOTE — ANESTHESIA PREPROCEDURE EVALUATION
"Anesthesia Pre-Procedure Evaluation    Patient: Joana Caicedo   MRN: [id number] : 1965        Procedure : Procedure(s):  CHOLECYSTECTOMY, LAPAROSCOPIC          Past Medical History:   Diagnosis Date     Personal history of other medical treatment (CODE)     Demyelination , Per patient, has had mini strokes     Pure hyperglyceridemia     No Comments Provided      Past Surgical History:   Procedure Laterality Date     APPENDECTOMY OPEN      No Comments Provided     CERCLAGE CERVICAL      x2 with last 2 pregnancies.     LAPAROSCOPIC TUBAL LIGATION      No Comments Provided      Allergies   Allergen Reactions     Amoxicillin Other (See Comments)     Yeast infection       Bupropion      Other reaction(s): Other - Describe In Comment Field  Facial and neck swelling     Niacin      Other reaction(s): Flushing     Sumatriptan      Other reaction(s): Other - Describe In Comment Field  \"burning veins\"      Social History     Tobacco Use     Smoking status: Current Every Day Smoker     Packs/day: 1.00     Years: 31.00     Pack years: 31.00     Types: Cigarettes     Smokeless tobacco: Never Used   Substance Use Topics     Alcohol use: Yes     Comment: Alcoholic Drinks/day: 2 per month      Wt Readings from Last 1 Encounters:   22 68 kg (150 lb)        Anesthesia Evaluation   Pt has had prior anesthetic. Type: General.        ROS/MED HX  ENT/Pulmonary:     (+) tobacco use (1), Current use, 30  Pack-Year Hx,  Intermittent, asthma Treatment: Inhaler prn,      Neurologic:  - neg neurologic ROS     Cardiovascular: Comment: Frequent PVC's    (+) Dyslipidemia hypertension-----    METS/Exercise Tolerance: 4 - Raking leaves, gardening    Hematologic:  - neg hematologic  ROS     Musculoskeletal:  - neg musculoskeletal ROS     GI/Hepatic:     (+) cholecystitis/cholelithiasis,     Renal/Genitourinary:  - neg Renal ROS     Endo:  - neg endo ROS     Psychiatric/Substance Use:  - neg psychiatric ROS     Infectious Disease: "  - neg infectious disease ROS     Malignancy:  - neg malignancy ROS     Other:  - neg other ROS          Physical Exam    Airway      Comment: Overbite, prominent teeth    Mallampati: III       Respiratory Devices and Support         Dental     Comment: worn        Cardiovascular       Comment: PVC's   Rhythm and rate: regular and normal     Pulmonary   pulmonary exam normal                OUTSIDE LABS:  CBC:   Lab Results   Component Value Date    WBC 8.8 04/11/2022    WBC 11.3 (H) 06/11/2021    HGB 15.2 04/11/2022    HGB 14.4 06/11/2021    HCT 45.2 04/11/2022    HCT 42.5 06/11/2021     04/11/2022     06/11/2021     BMP:   Lab Results   Component Value Date     04/11/2022     06/11/2021    POTASSIUM 3.6 04/11/2022    POTASSIUM 3.4 (L) 06/11/2021    CHLORIDE 104 04/11/2022    CHLORIDE 102 06/11/2021    CO2 25 04/11/2022    CO2 28 06/11/2021    BUN 8 04/11/2022    BUN 13 06/11/2021    CR 0.66 04/11/2022    CR 0.72 06/11/2021    GLC 93 04/11/2022    GLC 76 06/11/2021     COAGS:   Lab Results   Component Value Date    PTT 27 11/04/2012    INR 0.9 11/04/2012     POC:   Lab Results   Component Value Date    HCG Negative 10/03/2016     HEPATIC:   Lab Results   Component Value Date    ALBUMIN 4.2 04/11/2022    PROTTOTAL 7.2 04/11/2022    ALT 21 04/11/2022    AST 15 04/11/2022    ALKPHOS 79 04/11/2022    BILITOTAL 0.4 04/11/2022     OTHER:   Lab Results   Component Value Date    ADELE 9.5 04/11/2022    MAG 1.8 02/07/2013       Anesthesia Plan    ASA Status:  2   NPO Status:  NPO Appropriate    Anesthesia Type: General.     - Airway: ETT              Consents    Anesthesia Plan(s) and associated risks, benefits, and realistic alternatives discussed. Questions answered and patient/representative(s) expressed understanding.     - Discussed: Risks, Benefits and Alternatives for BOTH SEDATION and the PROCEDURE were discussed     - Discussed with:  Patient      - Extended Intubation/Ventilatory Support  Discussed: No.      - Patient is DNR/DNI Status: No    Use of blood products discussed: Yes.     - Discussed with: Patient.     - Consented: consented to blood products            Reason for refusal: other.     Postoperative Care    Pain management: IV analgesics, Multi-modal analgesia.   PONV prophylaxis: Ondansetron (or other 5HT-3), Dexamethasone or Solumedrol     Comments:                JOSE FITZPATRICK CRNA

## 2022-04-28 NOTE — ANESTHESIA CARE TRANSFER NOTE
Patient: Joana Caicedo    Procedure: Procedure(s):  CHOLECYSTECTOMY, LAPAROSCOPIC       Diagnosis: Chronic cholecystitis with calculus [K80.10]  Diagnosis Additional Information: No value filed.    Anesthesia Type:   General     Note:    Oropharynx: oropharynx clear of all foreign objects  Level of Consciousness: drowsy  Oxygen Supplementation: face mask  Level of Supplemental Oxygen (L/min / FiO2): 8  Independent Airway: airway patency satisfactory and stable  Dentition: dentition unchanged  Vital Signs Stable: post-procedure vital signs reviewed and stable  Report to RN Given: handoff report given  Patient transferred to: PACU    Handoff Report: Identifed the Patient, Identified the Reponsible Provider, Reviewed the pertinent medical history, Discussed the surgical course, Reviewed Intra-OP anesthesia mangement and issues during anesthesia, Set expectations for post-procedure period and Allowed opportunity for questions and acknowledgement of understanding      Vitals:  Vitals Value Taken Time   BP     Temp     Pulse     Resp     SpO2         Electronically Signed By: JOSE PITTMAN CRNA  April 28, 2022  10:34 AM

## 2022-04-28 NOTE — INTERVAL H&P NOTE
The history and physical has been reviewed and the patient has been examined.  There are no interim changes to the patient's history or physical condition.    Leonard Francois MD

## 2022-04-28 NOTE — ANESTHESIA POSTPROCEDURE EVALUATION
Patient: Joana Caicedo    Procedure: Procedure(s):  CHOLECYSTECTOMY, LAPAROSCOPIC       Anesthesia Type:  General    Note:  Disposition: Outpatient   Postop Pain Control: Uneventful            Sign Out: Well controlled pain   PONV: No   Neuro/Psych: Uneventful            Sign Out: Acceptable/Baseline neuro status   Airway/Respiratory: Uneventful            Sign Out: Acceptable/Baseline resp. status   CV/Hemodynamics: Uneventful            Sign Out: Acceptable CV status; No obvious hypovolemia; No obvious fluid overload   Other NRE: NONE   DID A NON-ROUTINE EVENT OCCUR? No           Last vitals:  Vitals Value Taken Time   BP 93/56 04/28/22 1145   Temp 97.1  F (36.2  C) 04/28/22 1135   Pulse 81 04/28/22 1148   Resp 28 04/28/22 1148   SpO2 95 % 04/28/22 1148   Vitals shown include unvalidated device data.    Electronically Signed By: JOSE FITZPATRICK CRNA  April 28, 2022  2:57 PM

## 2022-04-29 LAB
PATH REPORT.COMMENTS IMP SPEC: NORMAL
PATH REPORT.FINAL DX SPEC: NORMAL
PATH REPORT.RELEVANT HX SPEC: NORMAL
PHOTO IMAGE: NORMAL

## 2022-05-04 ENCOUNTER — OFFICE VISIT (OUTPATIENT)
Dept: SURGERY | Facility: OTHER | Age: 57
End: 2022-05-04
Attending: SURGERY
Payer: COMMERCIAL

## 2022-05-04 VITALS
DIASTOLIC BLOOD PRESSURE: 86 MMHG | SYSTOLIC BLOOD PRESSURE: 162 MMHG | WEIGHT: 153.4 LBS | OXYGEN SATURATION: 97 % | RESPIRATION RATE: 18 BRPM | TEMPERATURE: 97.8 F | BODY MASS INDEX: 26.33 KG/M2 | HEART RATE: 91 BPM

## 2022-05-04 DIAGNOSIS — Z98.890 POSTOPERATIVE STATE: Primary | ICD-10-CM

## 2022-05-04 PROBLEM — K80.10 CHRONIC CHOLECYSTITIS WITH CALCULUS: Status: RESOLVED | Noted: 2022-04-19 | Resolved: 2022-05-04

## 2022-05-04 PROCEDURE — 99024 POSTOP FOLLOW-UP VISIT: CPT | Performed by: SURGERY

## 2022-05-04 ASSESSMENT — PAIN SCALES - GENERAL: PAINLEVEL: MODERATE PAIN (5)

## 2022-05-04 NOTE — NURSING NOTE
Patient is here for a post-op for lap-cholecystectomy, states is having pain in upper abdomen.     No LMP recorded (lmp unknown). Patient is postmenopausal.  Medication Reconciliation: complete    FOOD SECURITY SCREENING QUESTIONS  Hunger Vital Signs:  Within the past 12 months we worried whether our food would run out before we got money to buy more. Never  Within the past 12 months the food we bought just didn't last and we didn't have money to get more. Never  Krystyna Kennedy LPN 5/4/2022 9:54 AM       Advance care directive on file? no  Advance care directive provided to patient? no       Krystyna Kennedy LPN

## 2022-05-04 NOTE — PROGRESS NOTES
Subjective:  This is a follow up after laparoscopic cholecystectomy on 4/28/22. The patient has complaints of incisional pain with coughing. No fevers, bowels working. Pathology reviewed  with patient.    Objective:BP (!) 162/86   Pulse 91   Temp 97.8  F (36.6  C) (Tympanic)   Resp 18   Wt 69.6 kg (153 lb 6.4 oz)   LMP  (LMP Unknown)   SpO2 97%   Breastfeeding No   BMI 26.33 kg/m    The incisions are healing well without erythema.soft and non-tender  Respiratory: clear    Assessment:   Doing well after laparoscopic cholecystectomy    Plan:  Follow-up as needed

## 2022-07-20 DIAGNOSIS — I10 ESSENTIAL HYPERTENSION: ICD-10-CM

## 2022-07-21 RX ORDER — HYDROCHLOROTHIAZIDE 25 MG/1
25 TABLET ORAL DAILY
Qty: 90 TABLET | Refills: 0 | Status: SHIPPED | OUTPATIENT
Start: 2022-07-21 | End: 2022-12-22

## 2022-07-21 NOTE — TELEPHONE ENCOUNTER
[id number] sent Rx request for the following:    Patient enrolled in our Rx Med Sync service to improve adherence. We are requesting a refill authorization in advance to ensure an active prescription is on file.     Requested Prescriptions   Pending Prescriptions Disp Refills     hydrochlorothiazide (HYDRODIURIL) 25 MG tablet [Pharmacy Med Name: HYDROCHLOROTHIAZIDE 25MG TAB] 90 tablet 3     Sig: TAKE 1 TABLET (25 MG) BY MOUTH DAILY       Diuretics (Including Combos) Protocol Failed - 7/21/2022 11:55 AM        Failed - Blood pressure under 140/90 in past 12 months     BP Readings from Last 3 Encounters:   05/04/22 (!) 162/86   04/28/22 103/76   04/19/22 128/80        Last Prescription Date:   6/3/21  Last Fill Qty/Refills:         90, R-3    Last Office Visit:              9/10/21   Future Office visit:           None    In clinical absence of patient's primary, Jaycee Ochoa, patient is requesting that this message be sent to the covering provider for consideration please.    Anna Lambert RN .............. 7/21/2022  11:56 AM

## 2022-07-24 DIAGNOSIS — I10 ESSENTIAL HYPERTENSION: ICD-10-CM

## 2022-07-25 RX ORDER — HYDROCHLOROTHIAZIDE 25 MG/1
25 TABLET ORAL DAILY
Qty: 90 TABLET | Refills: 0 | OUTPATIENT
Start: 2022-07-25

## 2022-07-25 NOTE — TELEPHONE ENCOUNTER
[id number] sent Rx request for the following:      HYDROCHLOROTHIAZIDE 25MG TAB      Last Prescription Date:   7/21/2022  Last Fill Qty/Refills:         90, R-0    Last Office Visit:              9/10/2021   Future Office visit:           none    Prescription refused.  This is a duplicate request.  Stephen Parekh RN.......7/25/2022 3:19 PM

## 2022-08-09 DIAGNOSIS — E78.5 HYPERLIPIDEMIA, UNSPECIFIED HYPERLIPIDEMIA TYPE: ICD-10-CM

## 2022-08-09 DIAGNOSIS — F34.1 DYSTHYMIC DISORDER: ICD-10-CM

## 2022-08-09 DIAGNOSIS — I10 ESSENTIAL HYPERTENSION: ICD-10-CM

## 2022-08-10 NOTE — TELEPHONE ENCOUNTER
Routing refill request to provider for review/approval because:    LOV; 9/10/21    Rehana Hall RN on 8/10/2022 at 10:28 AM

## 2022-08-11 RX ORDER — FLUOXETINE 40 MG/1
40 CAPSULE ORAL DAILY
Qty: 90 CAPSULE | Refills: 0 | Status: SHIPPED | OUTPATIENT
Start: 2022-08-11 | End: 2022-11-02

## 2022-08-11 RX ORDER — LISINOPRIL 20 MG/1
20 TABLET ORAL DAILY
Qty: 90 TABLET | Refills: 0 | Status: SHIPPED | OUTPATIENT
Start: 2022-08-11 | End: 2022-11-02

## 2022-08-11 RX ORDER — ROSUVASTATIN CALCIUM 20 MG/1
20 TABLET, COATED ORAL DAILY
Qty: 90 TABLET | Refills: 0 | Status: SHIPPED | OUTPATIENT
Start: 2022-08-11 | End: 2022-11-02

## 2022-08-11 RX ORDER — AMLODIPINE BESYLATE 5 MG/1
TABLET ORAL
Qty: 90 TABLET | Refills: 0 | Status: SHIPPED | OUTPATIENT
Start: 2022-08-11 | End: 2022-11-02

## 2022-08-19 ENCOUNTER — TELEPHONE (OUTPATIENT)
Dept: FAMILY MEDICINE | Facility: OTHER | Age: 57
End: 2022-08-19

## 2022-08-19 NOTE — TELEPHONE ENCOUNTER
Patient is wondering why the hydrochlorothiazide has not been filled. Please call.    Carolyn Ramírez on 8/19/2022 at 3:43 PM

## 2022-08-19 NOTE — TELEPHONE ENCOUNTER
Called and left message for patient to please return call.     RX was refilled on 7/21 for 90 days to [facility].     Mita Jones LPN on 8/19/2022 at 4:35 PM

## 2022-08-24 NOTE — TELEPHONE ENCOUNTER
Left message that refill was sent in to [facility] on 7/21.   Haley Ortiz LPN ....................  8/24/2022   8:17 AM

## 2022-10-30 DIAGNOSIS — I10 ESSENTIAL HYPERTENSION: ICD-10-CM

## 2022-10-30 DIAGNOSIS — F34.1 DYSTHYMIC DISORDER: ICD-10-CM

## 2022-10-30 DIAGNOSIS — E78.5 HYPERLIPIDEMIA, UNSPECIFIED HYPERLIPIDEMIA TYPE: ICD-10-CM

## 2022-11-02 NOTE — TELEPHONE ENCOUNTER
Last Prescription Date: 8/11/22  Last Qty/Refills: 90 / 0  Last Office Visit: 9/10/21  Future Office Visit: 12/22/22    Patient was called and instructed she is due for wellness visit. Medications pended until appointment    Corinne R Thayer, RN on 11/2/2022 at 9:08 AM     Requested Prescriptions   Pending Prescriptions Disp Refills     lisinopril (ZESTRIL) 20 MG tablet [Pharmacy Med Name: LISINOPRIL 20MG TABLET] 90 tablet 0     Sig: TAKE 1 TABLET (20 MG) BY MOUTH DAILY       ACE Inhibitors (Including Combos) Protocol Failed - 10/30/2022  5:00 AM        Failed - Blood pressure under 140/90 in past 12 months     BP Readings from Last 3 Encounters:   05/04/22 (!) 162/86   04/28/22 103/76   04/19/22 128/80        Recent Labs   Lab Test 04/11/22  1350   CR 0.66       Ok to refill medication if creatinine is low          Passed - Normal serum potassium on file in past 12 months     Recent Labs   Lab Test 04/11/22  1350   POTASSIUM 3.6          amLODIPine (NORVASC) 5 MG tablet [Pharmacy Med Name: AMLODIPINE 5MG TABLET] 90 tablet 0     Sig: TAKE 1 TABLET BY MOUTH ONCEDAILY       Calcium Channel Blockers Protocol  Failed - 10/30/2022  5:00 AM        Failed - Blood pressure under 140/90 in past 12 months     BP Readings from Last 3 Encounters:   05/04/22 (!) 162/86   04/28/22 103/76   04/19/22 128/80        rosuvastatin (CRESTOR) 20 MG tablet [Pharmacy Med Name: ROSUVASTATIN 20MG TABLET] 90 tablet 0     Sig: TAKE 1 TABLET (20 MG) BY MOUTH DAILY       Statins Protocol Failed - 10/30/2022  5:00 AM        Failed - LDL on file in past 12 months     Recent Labs   Lab Test 06/11/21  0802   LDL Cannot estimate LDL when triglyceride exceeds 400 mg/dL             Passed - No abnormal creatine kinase in past 12 months     No lab results found.           Passed - Recent (12 mo) or future (30 days) visit within the authorizing provider's specialty     Patient has had an office visit with the authorizing provider or a provider within the  "authorizing providers department within the previous 12 mos or has a future within next 30 days. See \"Patient Info\" tab in inbasket, or \"Choose Columns\" in Meds & Orders section of the refill encounter.         FLUoxetine (PROZAC) 40 MG capsule [Pharmacy Med Name: FLUOXETINE 40MG CAPSULE] 90 capsule 0     Sig: TAKE 1 CAPSULE (40 MG) BY MOUTH DAILY          "

## 2022-11-03 RX ORDER — AMLODIPINE BESYLATE 5 MG/1
TABLET ORAL
Qty: 30 TABLET | Refills: 1 | Status: SHIPPED | OUTPATIENT
Start: 2022-11-03 | End: 2022-12-22

## 2022-11-03 RX ORDER — ROSUVASTATIN CALCIUM 20 MG/1
20 TABLET, COATED ORAL DAILY
Qty: 30 TABLET | Refills: 1 | Status: SHIPPED | OUTPATIENT
Start: 2022-11-03 | End: 2022-12-22

## 2022-11-03 RX ORDER — FLUOXETINE 40 MG/1
40 CAPSULE ORAL DAILY
Qty: 30 CAPSULE | Refills: 1 | Status: SHIPPED | OUTPATIENT
Start: 2022-11-03 | End: 2022-12-22

## 2022-11-03 RX ORDER — LISINOPRIL 20 MG/1
20 TABLET ORAL DAILY
Qty: 30 TABLET | Refills: 1 | Status: SHIPPED | OUTPATIENT
Start: 2022-11-03 | End: 2022-12-22

## 2022-11-13 DIAGNOSIS — F41.9 ANXIETY: ICD-10-CM

## 2022-11-16 RX ORDER — LORAZEPAM 0.5 MG/1
0.5 TABLET ORAL EVERY 6 HOURS
Qty: 30 TABLET | Refills: 0 | Status: SHIPPED | OUTPATIENT
Start: 2022-11-16 | End: 2022-12-22

## 2022-11-16 NOTE — TELEPHONE ENCOUNTER
Refilled Rx.   Upcoming appt with PCP.   PDMP Review       Value Time User    State PDMP site checked  Yes 11/16/2022  1:21 PM Brandi Marrero PA-C Jennifer Oja, PA-C ..................11/16/2022 1:22 PM

## 2022-11-16 NOTE — TELEPHONE ENCOUNTER
[id number] sent Rx request for the following:      Requested Prescriptions   Pending Prescriptions Disp Refills     LORazepam (ATIVAN) 0.5 MG tablet [Pharmacy Med Name: LORAZEPAM 0.5MG TABLET] 30 tablet 0     Sig: TAKE 1 TABLET (0.5 MG) BY MOUTH EVERY 6 HOURS   Last Prescription Date:   7/8/22  Last Fill Qty/Refills:         30, R-0    Last Office Visit:              9/10/21   Future Office visit:           None    Pt due for annual exam. Routing to provider for refill consideration. Routing to  OUTREACH APPT REQUESTS pool, to assist Pt in scheduling appointment.     In clinical absence of patient's primary, Jaycee Ochoa, patient is requesting that this message be sent to the covering provider for consideration please.    Anna Lambert RN .............. 11/16/2022  12:17 PM

## 2022-12-13 DIAGNOSIS — I10 ESSENTIAL HYPERTENSION: ICD-10-CM

## 2022-12-13 NOTE — TELEPHONE ENCOUNTER
Patient enrolled in our Rx Med Sync service to improve adherence. We are requesting a refill authorization in advance to ensure an active prescription is on file.    Anna Lambert RN .............. 12/13/2022  9:31 AM

## 2022-12-15 RX ORDER — HYDROCHLOROTHIAZIDE 25 MG/1
25 TABLET ORAL DAILY
Qty: 90 TABLET | Refills: 0 | OUTPATIENT
Start: 2022-12-15

## 2022-12-15 NOTE — TELEPHONE ENCOUNTER
Medication request denied as pharmacy states patient should have enough medication until annual appointment 12/22/22    Corinne R Thayer, RN on 12/15/2022 at 11:29 AM     Requested Prescriptions   Pending Prescriptions Disp Refills     hydrochlorothiazide (HYDRODIURIL) 25 MG tablet 90 tablet 0     Sig: Take 1 tablet (25 mg) by mouth daily       Diuretics (Including Combos) Protocol Failed - 12/13/2022  9:32 AM        Failed - Blood pressure under 140/90 in past 12 months     BP Readings from Last 3 Encounters:   05/04/22 (!) 162/86   04/28/22 103/76   04/19/22 128/80        Passed - Normal serum creatinine on file in past 12 months     Recent Labs   Lab Test 04/11/22  1350   CR 0.66            Passed - Normal serum potassium on file in past 12 months     Recent Labs   Lab Test 04/11/22  1350   POTASSIUM 3.6              Passed - Normal serum sodium on file in past 12 months     Recent Labs   Lab Test 04/11/22  1350

## 2022-12-19 DIAGNOSIS — I10 ESSENTIAL HYPERTENSION: ICD-10-CM

## 2022-12-20 RX ORDER — HYDROCHLOROTHIAZIDE 25 MG/1
25 TABLET ORAL DAILY
Qty: 90 TABLET | Refills: 0 | OUTPATIENT
Start: 2022-12-20

## 2022-12-20 NOTE — TELEPHONE ENCOUNTER
[facility] sent Rx request for the following:    HYDROCHLOROTHIAZIDE 25MG TAB  Last Prescription Date:   7/21/22  Last Fill Qty/Refills:         90, R-0    Last Office Visit:              9/10/21   Future Office visit:           12/22/22  Patient has appointment in two days.  Delicia Strickland RN on 12/20/2022 at 2:58 PM

## 2022-12-22 ENCOUNTER — OFFICE VISIT (OUTPATIENT)
Dept: FAMILY MEDICINE | Facility: OTHER | Age: 57
End: 2022-12-22
Attending: FAMILY MEDICINE
Payer: COMMERCIAL

## 2022-12-22 VITALS
TEMPERATURE: 96.8 F | DIASTOLIC BLOOD PRESSURE: 86 MMHG | BODY MASS INDEX: 27.39 KG/M2 | OXYGEN SATURATION: 97 % | HEART RATE: 78 BPM | HEIGHT: 63 IN | WEIGHT: 154.6 LBS | SYSTOLIC BLOOD PRESSURE: 134 MMHG

## 2022-12-22 DIAGNOSIS — E78.5 HYPERLIPIDEMIA, UNSPECIFIED HYPERLIPIDEMIA TYPE: ICD-10-CM

## 2022-12-22 DIAGNOSIS — Z87.891 PERSONAL HISTORY OF TOBACCO USE: ICD-10-CM

## 2022-12-22 DIAGNOSIS — K04.7 DENTAL INFECTION: ICD-10-CM

## 2022-12-22 DIAGNOSIS — J45.20 MILD INTERMITTENT ASTHMA WITHOUT COMPLICATION: ICD-10-CM

## 2022-12-22 DIAGNOSIS — F41.9 ANXIETY: ICD-10-CM

## 2022-12-22 DIAGNOSIS — Z00.00 HEALTH CARE MAINTENANCE: Primary | ICD-10-CM

## 2022-12-22 DIAGNOSIS — R10.13 ABDOMINAL PAIN, EPIGASTRIC: ICD-10-CM

## 2022-12-22 DIAGNOSIS — F34.1 DYSTHYMIC DISORDER: ICD-10-CM

## 2022-12-22 DIAGNOSIS — Z12.31 VISIT FOR SCREENING MAMMOGRAM: ICD-10-CM

## 2022-12-22 DIAGNOSIS — R35.0 URINARY FREQUENCY: ICD-10-CM

## 2022-12-22 DIAGNOSIS — K64.4 EXTERNAL HEMORRHOIDS: ICD-10-CM

## 2022-12-22 DIAGNOSIS — Z13.6 ENCOUNTER FOR SCREENING FOR CORONARY ARTERY DISEASE: ICD-10-CM

## 2022-12-22 DIAGNOSIS — R42 DIZZINESS: ICD-10-CM

## 2022-12-22 DIAGNOSIS — I10 ESSENTIAL HYPERTENSION: ICD-10-CM

## 2022-12-22 DIAGNOSIS — Z13.29 SCREENING FOR THYROID DISORDER: ICD-10-CM

## 2022-12-22 DIAGNOSIS — B37.31 YEAST INFECTION OF THE VAGINA: ICD-10-CM

## 2022-12-22 DIAGNOSIS — Z12.11 SCREEN FOR COLON CANCER: ICD-10-CM

## 2022-12-22 PROCEDURE — 99213 OFFICE O/P EST LOW 20 MIN: CPT | Mod: 25 | Performed by: FAMILY MEDICINE

## 2022-12-22 PROCEDURE — G0296 VISIT TO DETERM LDCT ELIG: HCPCS | Performed by: FAMILY MEDICINE

## 2022-12-22 PROCEDURE — 99396 PREV VISIT EST AGE 40-64: CPT | Mod: 25 | Performed by: FAMILY MEDICINE

## 2022-12-22 RX ORDER — HYDROCHLOROTHIAZIDE 25 MG/1
25 TABLET ORAL DAILY
Qty: 90 TABLET | Refills: 3 | Status: SHIPPED | OUTPATIENT
Start: 2022-12-22 | End: 2023-12-21

## 2022-12-22 RX ORDER — FLUCONAZOLE 150 MG/1
150 TABLET ORAL ONCE
Qty: 1 TABLET | Refills: 0 | Status: SHIPPED | OUTPATIENT
Start: 2022-12-22 | End: 2022-12-22

## 2022-12-22 RX ORDER — AMLODIPINE BESYLATE 5 MG/1
5 TABLET ORAL DAILY
Qty: 90 TABLET | Refills: 3 | Status: SHIPPED | OUTPATIENT
Start: 2022-12-22 | End: 2024-02-05

## 2022-12-22 RX ORDER — PENICILLIN V POTASSIUM 500 MG/1
500 TABLET, FILM COATED ORAL 3 TIMES DAILY
Qty: 21 TABLET | Refills: 0 | Status: SHIPPED | OUTPATIENT
Start: 2022-12-22 | End: 2022-12-29

## 2022-12-22 RX ORDER — LORAZEPAM 0.5 MG/1
0.5 TABLET ORAL EVERY 6 HOURS
Qty: 30 TABLET | Refills: 0 | Status: SHIPPED | OUTPATIENT
Start: 2022-12-22 | End: 2023-02-07

## 2022-12-22 RX ORDER — ALBUTEROL SULFATE 90 UG/1
2 AEROSOL, METERED RESPIRATORY (INHALATION) EVERY 4 HOURS PRN
Qty: 18 G | Refills: 11 | Status: SHIPPED | OUTPATIENT
Start: 2022-12-22 | End: 2024-02-05

## 2022-12-22 RX ORDER — FLUOXETINE 40 MG/1
40 CAPSULE ORAL DAILY
Qty: 90 CAPSULE | Refills: 3 | Status: SHIPPED | OUTPATIENT
Start: 2022-12-22 | End: 2023-12-21

## 2022-12-22 RX ORDER — ROSUVASTATIN CALCIUM 20 MG/1
20 TABLET, COATED ORAL DAILY
Qty: 90 TABLET | Refills: 3 | Status: SHIPPED | OUTPATIENT
Start: 2022-12-22 | End: 2022-12-30

## 2022-12-22 RX ORDER — MECLIZINE HYDROCHLORIDE 25 MG/1
25 TABLET ORAL 3 TIMES DAILY PRN
Qty: 30 TABLET | Refills: 11 | Status: SHIPPED | OUTPATIENT
Start: 2022-12-22 | End: 2024-02-05

## 2022-12-22 RX ORDER — ASPIRIN 81 MG/1
81 TABLET ORAL DAILY
Qty: 100 TABLET | Refills: 3 | COMMUNITY
Start: 2022-12-22

## 2022-12-22 RX ORDER — LISINOPRIL 20 MG/1
20 TABLET ORAL DAILY
Qty: 90 TABLET | Refills: 3 | Status: SHIPPED | OUTPATIENT
Start: 2022-12-22 | End: 2023-12-21

## 2022-12-22 ASSESSMENT — ENCOUNTER SYMPTOMS
HEADACHES: 1
ARTHRALGIAS: 1
ABDOMINAL PAIN: 1
CHILLS: 0
PARESTHESIAS: 0
NERVOUS/ANXIOUS: 1
JOINT SWELLING: 1
FREQUENCY: 0
HEARTBURN: 1
PALPITATIONS: 0
DIARRHEA: 0
HEMATURIA: 0
EYE PAIN: 0
SHORTNESS OF BREATH: 1
DYSURIA: 0
NAUSEA: 1
WEAKNESS: 0
FEVER: 0
COUGH: 1
SORE THROAT: 0
HEMATOCHEZIA: 0
DIZZINESS: 1
CONSTIPATION: 0
MYALGIAS: 1

## 2022-12-22 ASSESSMENT — PATIENT HEALTH QUESTIONNAIRE - PHQ9
SUM OF ALL RESPONSES TO PHQ QUESTIONS 1-9: 6
SUM OF ALL RESPONSES TO PHQ QUESTIONS 1-9: 6
10. IF YOU CHECKED OFF ANY PROBLEMS, HOW DIFFICULT HAVE THESE PROBLEMS MADE IT FOR YOU TO DO YOUR WORK, TAKE CARE OF THINGS AT HOME, OR GET ALONG WITH OTHER PEOPLE: SOMEWHAT DIFFICULT

## 2022-12-22 ASSESSMENT — PAIN SCALES - GENERAL: PAINLEVEL: MILD PAIN (3)

## 2022-12-22 NOTE — NURSING NOTE
Patient presents today for pap smear. Patient had her annual visit earlier this summer.    Medication Reconciliation Complete    Ashly Winn LPN  9/10/2021 2:40 PM   -3

## 2022-12-22 NOTE — PATIENT INSTRUCTIONS
Cologuard Patient Instructions    You received an order for a Cologuard test. You will receive your kit in the mail. Please follow the instructions that are provided in the kit.      Reminder: Ship Cologuard test the same day or the next day to allow enough delivery time. The lab must receive your specimen within 4 days for successful testing. If not delivered in time, you may have to complete the process again.    Home Pick-up:  Once you complete the kit, call [employer] at [phone] and they will schedule a UPS pickup for you.    OR    Drop Off Locations:  Once you have completed the collection and have it ready to be shipped, bring it to one of the following sites listed below. *Note: none of the sites ship out later than mid-morning. Please do not drop off Fridays, as they will not go out until Monday which will make your specimen inacceptable.     - [facility] ([address], [place] [address])      Drop off: Monday-Thursday, 8:00am-4:30pm at the [address] check-in desk      - [employer] [facility] ([address], [place] [address])   Drop off: Monday-Thursday, 8:00am-5:45pm     - [employer] ([address], [place] [address])     Drop off: Monday-Thursday, 3:00pm-5:30pm     Lung Cancer Screening   Frequently Asked Questions  If you are at high-risk for lung cancer, getting screened with low-dose computed tomography (LDCT) every year can help save your life. This handout offers answers to some of the most common questions about lung cancer screening. If you have other questions, please call [phone] ([phone]).     What is it?  Lung cancer screening uses special X-ray technology to create an image of your lung tissue. The exam is quick and easy and takes less than 10 seconds. We don t give you any medicine or use any needles. You can eat before and after the exam. You don t need to change your clothes as long as the clothing on your chest doesn t contain metal. But, you do  need to be able to hold your breath for at least 6 seconds during the exam.    What is the goal of lung cancer screening?  The goal of lung cancer screening is to save lives. Many times, lung cancer is not found until a person starts having physical symptoms. Lung cancer screening can help detect lung cancer in the earliest stages when it may be easier to treat.    Who should be screened for lung cancer?  We suggest lung cancer screening for anyone who is at high-risk for lung cancer. You are in the high-risk group if you:      are between the ages of 55 and 79, and    have smoked at least 1 pack of cigarettes a day for 20 or more years, and    still smoke or have quit within the past 15 years.    However, if you have a new cough or shortness of breath, you should talk to your doctor before being screened.    Why does it matter if I have symptoms?  Certain symptoms can be a sign that you have a condition in your lungs that should be checked and treated by your doctor. These symptoms include fever, chest pain, a new or changing cough, shortness of breath that you have never felt before, coughing up blood or unexplained weight loss. Having any of these symptoms can greatly affect the results of lung cancer screening.       Should all smokers get an LDCT lung cancer screening exam?  It depends. Lung cancer screening is for a very specific group of men and women who have a history of heavy smoking over a long period of time (see  Who should be screened for lung cancer  above).  I am in the high-risk group, but have been diagnosed with cancer in the past. Is LDCT lung cancer screening right for me?  In some cases, you should not have LDCT lung screening, such as when your doctor is already following your cancer with CT scan studies. Your doctor will help you decide if LDCT lung screening is right for you.  Do I need to have a screening exam every year?  Yes. If you are in the high-risk group described earlier, you should  get an LDCT lung cancer screening exam every year until you are 79, or are no longer willing or able to undergo screening and possible procedures to diagnose and treat lung cancer.  How effective is LDCT at preventing death from lung cancer?  Studies have shown that LDCT lung cancer screening can lower the risk of death from lung cancer by 20 percent in people who are at high-risk.  What are the risks?  There are some risks and limitations of LDCT lung cancer screening. We want to make sure you understand the risks and benefits, so please let us know if you have any questions. Your doctor may want to talk with you more about these risks.    Radiation exposure: As with any exam that uses radiation, there is a very small increased risk of cancer. The amount of radiation in LDCT is small--about the same amount a person would get from a mammogram. Your doctor orders the exam when he or she feels the potential benefits outweigh the risks.    False negatives: No test is perfect, including LDCT. It is possible that you may have a medical condition, including lung cancer, that is not found during your exam. This is called a false negative result.    False positives and more testing: LDCT very often finds something in the lung that could be cancer, but in fact is not. This is called a false positive result. False positive tests often cause anxiety. To make sure these findings are not cancer, you may need to have more tests. These tests will be done only if you give us permission. Sometimes patients need a treatment that can have side effects, such as a biopsy. For more information on false positives, see  What can I expect from the results?     Findings not related to lung cancer: Your LDCT exam also takes pictures of areas of your body next to your lungs. In a very small number of cases, the CT scan will show an abnormal finding in one of these areas, such as your kidneys, adrenal glands, liver or thyroid. This finding may  not be serious, but you may need more tests. Your doctor can help you decide what other tests you may need, if any.  What can I expect from the results?  About 1 out of 4 LDCT exams will find something that may need more tests. Most of the time, these findings are lung nodules. Lung nodules are very small collections of tissue in the lung. These nodules are very common, and the vast majority--more than 97 percent--are not cancer (benign). Most are normal lymph nodes or small areas of scarring from past infections.  But, if a small lung nodule is found to be cancer, the cancer can be cured more than 90 percent of the time. To know if the nodule is cancer, we may need to get more images before your next yearly screening exam. If the nodule has suspicious features (for example, it is large, has an odd shape or grows over time), we will refer you to a specialist for further testing.  Will my doctor also get the results?  Yes. Your doctor will get a copy of your results.  Is it okay to keep smoking now that there s a cancer screening exam?  No. Tobacco is one of the strongest cancer-causing agents. It causes not only lung cancer, but other cancers and cardiovascular (heart) diseases as well. The damage caused by smoking builds over time. This means that the longer you smoke, the higher your risk of disease. While it is never too late to quit, the sooner you quit, the better.  Where can I find help to quit smoking?  The best way to prevent lung cancer is to stop smoking. If you have already quit smoking, congratulations and keep it up! For help on quitting smoking, please call [employer] at 3-739-QUITNOW (1-115.886.1192) or the American Cancer Society at 1-404.936.7000 to find local resources near you.  One-on-one health coaching:  If you d prefer to work individually with a health care provider on tobacco cessation, we offer:      Medication Therapy Management:  Our specially trained pharmacists work closely with you  and your doctor to help you quit smoking.  Call [phone] or [phone] (toll free).

## 2022-12-22 NOTE — NURSING NOTE
Chief Complaint   Patient presents with     Physical     Has several concerns.     Medication Reconciliation: complete    Mita Jones LPN

## 2022-12-22 NOTE — LETTER
My Depression Action Plan  Name: Joana Caicedo   Date of Birth 1965  Date: 12/22/2022    My doctor: Jaycee Ochoa   My clinic: [facility]  [address]  [place] [address]  [phone]          GREEN    ZONE   Good Control    What it looks like:     Things are going generally well. You have normal ups and downs. You may even feel depressed from time to time, but bad moods usually last less than a day.   What you need to do:  1. Continue to care for yourself (see self care plan)  2. Check your depression survival kit and update it as needed  3. Follow your physician s recommendations including any medication.  4. Do not stop taking medication unless you consult with your physician first.           YELLOW         ZONE Getting Worse    What it looks like:     Depression is starting to interfere with your life.     It may be hard to get out of bed; you may be starting to isolate yourself from others.    Symptoms of depression are starting to last most all day and this has happened for several days.     You may have suicidal thoughts but they are not constant.   What you need to do:     1. Call your care team. Your response to treatment will improve if you keep your care team informed of your progress. Yellow periods are signs an adjustment may need to be made.     2. Continue your self-care.  Just get dressed and ready for the day.  Don't give yourself time to talk yourself out of it.    3. Talk to someone in your support network.    4. Open up your Depression Self-Care Plan/Wellness Kit.           RED    ZONE Medical Alert - Get Help    What it looks like:     Depression is seriously interfering with your life.     You may experience these or other symptoms: You can t get out of bed most days, can t work or engage in other necessary activities, you have trouble taking care of basic hygiene, or basic responsibilities, thoughts of suicide or death  that will not go away, self-injurious behavior.     What you need to do:  1. Call your care team and request a same-day appointment. If they are not available (weekends or after hours) call your local crisis line, emergency room or 911.          Depression Self-Care Plan / Wellness Kit    Many people find that medication and therapy are helpful treatments for managing depression. In addition, making small changes to your everyday life can help to boost your mood and improve your wellbeing. Below are some tips for you to consider. Be sure to talk with your medical provider and/or behavioral health consultant if your symptoms are worsening or not improving.     Sleep   Sleep hygiene  means all of the habits that support good, restful sleep. It includes maintaining a consistent bedtime and wake time, using your bedroom only for sleeping or sex, and keeping the bedroom dark and free of distractions like a computer, smartphone, or television.     Develop a Healthy Routine  Maintain good hygiene. Get out of bed in the morning, make your bed, brush your teeth, take a shower, and get dressed. Don t spend too much time viewing media that makes you feel stressed. Find time to relax each day.    Exercise  Get some form of exercise every day. This will help reduce pain and release endorphins, the  feel good  chemicals in your brain. It can be as simple as just going for a walk or doing some gardening, anything that will get you moving.      Diet  Strive to eat healthy foods, including fruits and vegetables. Drink plenty of water. Avoid excessive sugar, caffeine, alcohol, and other mood-altering substances.     Stay Connected with Others  Stay in touch with friends and family members.    Manage Your Mood  Try deep breathing, massage therapy, biofeedback, or meditation. Take part in fun activities when you can. Try to find something to smile about each day.     Psychotherapy  Be open to working with a therapist if your provider  recommends it.     Medication  Be sure to take your medication as prescribed. Most anti-depressants need to be taken every day. It usually takes several weeks for medications to work. Not all medicines work for all people. It is important to follow-up with your provider to make sure you have a treatment plan that is working for you. Do not stop your medication abruptly without first discussing it with your provider.    Crisis Resources   These hotlines are for both adults and children. They and are open 24 hours a day, 7 days a week unless noted otherwise.      National Suicide Prevention Lifeline   988 or 1-023-863-QLCW (3748)      Crisis Text Line    www.crisistextline.org  Text HOME to 813598 from anywhere in the [place], anytime, about any type of crisis. A live, trained crisis counselor will receive the text and respond quickly.      [employer] Lifeline for LGBTQ Youth  A national crisis intervention and suicide lifeline for LGBTQ youth under 25. Provides a safe place to talk without judgement. Call 1-527.346.6674; text START to 097585 or visit www.thetrevorproject.org to talk to a trained counselor.      For [facility] crisis numbers, visit the [facility] website at:  https://[place].gov/dhs/people-we-serve/adults/health-care/mental-health/resources/crisis-contacts.jsp

## 2022-12-22 NOTE — PROGRESS NOTES
SUBJECTIVE:   CC: Joana is an 57 year old who presents for preventive health visit.   Patient has been advised of split billing requirements and indicates understanding: Yes     Healthy Habits:     Getting at least 3 servings of Calcium per day:  NO    Bi-annual eye exam:  Yes    Dental care twice a year:  Yes    Sleep apnea or symptoms of sleep apnea:  Daytime drowsiness    Diet:  Regular (no restrictions)    Frequency of exercise:  None    Taking medications regularly:  Yes    PHQ-2 Total Score: 2    Additional concerns today:  No    Needs penicillin for dental infection.  She has amoxicillin listed as an allergy, but apparently it has only given her yeast infections.  She has had prescriptions for penicillin repeatedly without problem.  She needs a prescription for Diflucan as well.    Has had issues with nausea on occasion which comes out of the blue.  Sometimes it wakes her at night.  Has accompanying epigastric discomfort.  No blood in stools or tarry stools.  No vomiting.  Stomach hurts with this.  She had gallstones, but had her gallbladder removed this past spring.  No jaundice.    Has a few hemorrhoids which are really bothering her.  She has noted that they bleed on occasion with wiping.  Very itchy and burn.     3 times in the past month, her right arm feels very heavy.  Still can move her hand, but has pain that radiates into her neck.  No chest pain with it.  Some shortness of breath with it.            Today's PHQ-2 Score:   PHQ-2 ( 1999 Pfizer) 12/22/2022   Q1: Little interest or pleasure in doing things 1   Q2: Feeling down, depressed or hopeless 1   PHQ-2 Score 2   PHQ-2 Total Score (12-17 Years)- Positive if 3 or more points; Administer PHQ-A if positive -   Q1: Little interest or pleasure in doing things Several days   Q2: Feeling down, depressed or hopeless Several days   PHQ-2 Score 2       Have you ever done Advance Care Planning? (For example, a Health Directive, POLST, or a discussion  with a medical provider or your loved ones about your wishes): No, advance care planning information given to patient to review.  Patient declined advance care planning discussion at this time.    Social History     Tobacco Use     Smoking status: Every Day     Packs/day: 1.00     Years: 31.00     Pack years: 31.00     Types: Cigarettes     Smokeless tobacco: Never   Substance Use Topics     Alcohol use: Not Currently     Comment: Alcoholic Drinks/day: 2 per month     If you drink alcohol do you typically have >3 drinks per day or >7 drinks per week? No    Alcohol Use 12/22/2022   Prescreen: >3 drinks/day or >7 drinks/week? Not Applicable   Prescreen: >3 drinks/day or >7 drinks/week? -       Reviewed orders with patient.  Reviewed health maintenance and updated orders accordingly - Yes  BP Readings from Last 3 Encounters:   12/22/22 134/86   05/04/22 (!) 162/86   04/28/22 103/76    Wt Readings from Last 3 Encounters:   12/22/22 70.1 kg (154 lb 9.6 oz)   05/04/22 69.6 kg (153 lb 6.4 oz)   04/28/22 68 kg (150 lb)                  Patient Active Problem List   Diagnosis     Dysthymic disorder     Hyperlipidemia     Hypertension     Insomnia     Nicotine addiction     Diarrhea     Hard to intubate     Past Surgical History:   Procedure Laterality Date     APPENDECTOMY OPEN      No Comments Provided     CERCLAGE CERVICAL      x2 with last 2 pregnancies.     LAPAROSCOPIC CHOLECYSTECTOMY N/A 4/28/2022    Procedure: CHOLECYSTECTOMY, LAPAROSCOPIC;  Surgeon: Leonard Francois MD;  Location: [facility] OR     LAPAROSCOPIC TUBAL LIGATION      No Comments Provided       Social History     Tobacco Use     Smoking status: Every Day     Packs/day: 1.00     Years: 31.00     Pack years: 31.00     Types: Cigarettes     Smokeless tobacco: Never   Substance Use Topics     Alcohol use: Not Currently     Comment: Alcoholic Drinks/day: 2 per month     Family History   Problem Relation Age of Onset     Heart Disease Father         Heart Disease      Other - See Comments Father          stage III COPD     Heart Disease Mother         Heart Disease     Other - See Comments Mother          kidney disease     Abdominal Aortic Aneurysm Mother         had AAA and thoracic aneurysm     Other - See Comments Other         High cholesterol runs in family     Diabetes Sister         Diabetes     Other - See Comments Sister          fibromyalgia, celiac disease.         Current Outpatient Medications   Medication Sig Dispense Refill     albuterol (PROAIR HFA/PROVENTIL HFA/VENTOLIN HFA) 108 (90 Base) MCG/ACT inhaler Inhale 2 puffs into the lungs every 4 hours as needed for shortness of breath 18 g 11     amLODIPine (NORVASC) 5 MG tablet Take 1 tablet (5 mg) by mouth daily 90 tablet 3     aspirin 81 MG EC tablet Take 1 tablet (81 mg) by mouth daily 100 tablet 3     fluconazole (DIFLUCAN) 150 MG tablet Take 1 tablet (150 mg) by mouth once for 1 dose 1 tablet 0     FLUoxetine (PROZAC) 20 MG capsule Take 1 capsule (20 mg) by mouth daily With 40 mg to equal 60 mg daily. 90 capsule 3     FLUoxetine (PROZAC) 40 MG capsule Take 1 capsule (40 mg) by mouth daily With 20 mg to equal 60 mg daily. 90 capsule 3     hydrochlorothiazide (HYDRODIURIL) 25 MG tablet Take 1 tablet (25 mg) by mouth daily 90 tablet 3     lisinopril (ZESTRIL) 20 MG tablet Take 1 tablet (20 mg) by mouth daily 90 tablet 3     LORazepam (ATIVAN) 0.5 MG tablet Take 1 tablet (0.5 mg) by mouth every 6 hours 30 tablet 0     meclizine (ANTIVERT) 25 MG tablet Take 1 tablet (25 mg) by mouth 3 times daily as needed for dizziness 30 tablet 11     penicillin V (VEETID) 500 MG tablet Take 1 tablet (500 mg) by mouth 3 times daily for 7 days 21 tablet 0     rosuvastatin (CRESTOR) 20 MG tablet Take 1 tablet (20 mg) by mouth daily 90 tablet 3     valACYclovir (VALTREX) 1000 mg tablet TAKE 2 TABLETS BY MOUTH 2 TIMES DAILY FOR ONE DAY, THEN TAKE 1 TABLET TWICE DAILY FOR 3 DAYS. MAY REPEAT FOR FUTURE OUTBREAKSRE OUTBREA 30 tablet 8  "    Vitamin D (Cholecalciferol) 25 MCG (1000 UT) CAPS Take 3,000 Units by mouth       Allergies   Allergen Reactions     Amoxicillin Other (See Comments)     Yeast infection       Bupropion      Other reaction(s): Other - Describe In Comment Field  Facial and neck swelling     Niacin      Other reaction(s): Flushing     Sumatriptan      Other reaction(s): Other - Describe In Comment Field  \"burning veins\"       Breast Cancer Screening:  Any new diagnosis of family breast, ovarian, or bowel cancer? No    FHS-7:   Breast CA Risk Assessment (FHS-7) 9/10/2021   Did any of your first-degree relatives have breast or ovarian cancer? No   Did any of your relatives have bilateral breast cancer? No   Did any man in your family have breast cancer? No   Did any woman in your family have breast and ovarian cancer? No   Did any woman in your family have breast cancer before age 50 y? No   Do you have 2 or more relatives with breast and/or ovarian cancer? No   Do you have 2 or more relatives with breast and/or bowel cancer? Yes       Mammogram Screening: Recommended mammography every 1-2 years with patient discussion and risk factor consideration  Pertinent mammograms are reviewed under the imaging tab.    History of abnormal Pap smear: NO - age 30-65 PAP every 5 years with negative HPV co-testing recommended  PAP / HPV Latest Ref Rng & Units 9/10/2021   PAP   Negative for Intraepithelial Lesion or Malignancy (NILM)   HPV16 Negative Negative   HPV18 Negative Negative   HRHPV Negative Negative     Reviewed and updated as needed this visit by clinical staff   Tobacco  Allergies  Meds              Reviewed and updated as needed this visit by Provider                 Past Medical History:   Diagnosis Date     Chronic cholecystitis with calculus 4/19/2022     Personal history of other medical treatment (CODE)     Demyelination , Per patient, has had mini strokes     Pure hyperglyceridemia     No Comments Provided      Past Surgical " History:   Procedure Laterality Date     APPENDECTOMY OPEN      No Comments Provided     CERCLAGE CERVICAL      x2 with last 2 pregnancies.     LAPAROSCOPIC CHOLECYSTECTOMY N/A 4/28/2022    Procedure: CHOLECYSTECTOMY, LAPAROSCOPIC;  Surgeon: Leonard Francois MD;  Location: [facility]     LAPAROSCOPIC TUBAL LIGATION      No Comments Provided       Review of Systems   Constitutional: Negative for chills and fever.   HENT: Positive for congestion and ear pain. Negative for hearing loss and sore throat.    Eyes: Positive for visual disturbance. Negative for pain.   Respiratory: Positive for cough and shortness of breath.    Cardiovascular: Positive for peripheral edema. Negative for chest pain and palpitations.   Gastrointestinal: Positive for abdominal pain, heartburn and nausea. Negative for constipation, diarrhea and hematochezia.   Genitourinary: Positive for urgency. Negative for dysuria, frequency, genital sores and hematuria.   Musculoskeletal: Positive for arthralgias, joint swelling and myalgias.   Skin: Negative for rash.   Neurological: Positive for dizziness and headaches. Negative for weakness and paresthesias.   Psychiatric/Behavioral: Positive for mood changes. The patient is nervous/anxious.      CONSTITUTIONAL: NEGATIVE for fever, chills, change in weight  INTEGUMENTARY/SKIN: NEGATIVE for worrisome rashes, moles or lesions  EYES: NEGATIVE for vision changes or irritation  ENT: NEGATIVE for ear, mouth and throat problems  RESP: NEGATIVE for significant cough or SOB  BREAST: NEGATIVE for masses, tenderness or discharge  CV: NEGATIVE for chest pain, palpitations or peripheral edema  GI: NEGATIVE for nausea, abdominal pain, heartburn, or change in bowel habits  : NEGATIVE for unusual urinary or vaginal symptoms. No vaginal bleeding.  MUSCULOSKELETAL: NEGATIVE for significant arthralgias or myalgia  NEURO: NEGATIVE for weakness, dizziness or paresthesias  PSYCHIATRIC: NEGATIVE for changes in mood or affect     "  OBJECTIVE:   /86   Pulse 78   Temp 96.8  F (36  C) (Tympanic)   Ht 1.6 m (5' 3\")   Wt 70.1 kg (154 lb 9.6 oz)   LMP  (LMP Unknown)   SpO2 97%   Breastfeeding No   BMI 27.39 kg/m    Physical Exam  Constitutional:       General: She is not in acute distress.     Appearance: She is well-developed.   HENT:      Head: Normocephalic.      Right Ear: Tympanic membrane and external ear normal.      Left Ear: Tympanic membrane and external ear normal.      Nose: Nose normal.      Mouth/Throat:      Pharynx: No oropharyngeal exudate.   Eyes:      General:         Right eye: No discharge.         Left eye: No discharge.      Conjunctiva/sclera: Conjunctivae normal.      Pupils: Pupils are equal, round, and reactive to light.   Neck:      Thyroid: No thyromegaly.      Trachea: No tracheal deviation.   Cardiovascular:      Rate and Rhythm: Normal rate and regular rhythm.      Pulses: Normal pulses.      Heart sounds: Normal heart sounds, S1 normal and S2 normal. No murmur heard.    No friction rub. No gallop. No S3 or S4 sounds.   Pulmonary:      Effort: Pulmonary effort is normal. No respiratory distress.      Breath sounds: Normal breath sounds. No wheezing or rales.      Comments: Breast exam:  No masses palpable bilaterally.  No skin changes, tethering or axillary lymphadenopathy bilaterally.    Abdominal:      General: Bowel sounds are normal. There is no distension.      Palpations: Abdomen is soft. There is no mass.      Tenderness: There is no abdominal tenderness.   Genitourinary:     Comments: Pelvic/Rectal exams deferred per patient.  Musculoskeletal:         General: Normal range of motion.      Cervical back: Neck supple.   Lymphadenopathy:      Cervical: No cervical adenopathy.   Skin:     General: Skin is warm and dry.      Findings: No rash.   Neurological:      Mental Status: She is alert and oriented to person, place, and time.      Motor: No abnormal muscle tone.      Deep Tendon Reflexes: " Reflexes are normal and symmetric.   Psychiatric:         Thought Content: Thought content normal.         Judgment: Judgment normal.           ASSESSMENT/PLAN:       ICD-10-CM    1. Health care maintenance  Z00.00       2. Visit for screening mammogram  Z12.31 MA Screen Bilateral w/Braxton      3. Screen for colon cancer  Z12.11 COLOGUARD(EXACT SCIENCES)      4. Dental infection  K04.7 penicillin V (VEETID) 500 MG tablet      5. Yeast infection of the vagina  B37.31 fluconazole (DIFLUCAN) 150 MG tablet      6. Abdominal pain, epigastric  R10.13 CBC and Differential     Comprehensive metabolic panel     Lipase     US Abdomen Complete      7. Hyperlipidemia, unspecified hyperlipidemia type  E78.5 rosuvastatin (CRESTOR) 20 MG tablet     Lipid Profile      8. Dizziness  R42 meclizine (ANTIVERT) 25 MG tablet      9. Anxiety  F41.9 LORazepam (ATIVAN) 0.5 MG tablet     FLUoxetine (PROZAC) 20 MG capsule      10. Essential hypertension  I10 lisinopril (ZESTRIL) 20 MG tablet     hydrochlorothiazide (HYDRODIURIL) 25 MG tablet     amLODIPine (NORVASC) 5 MG tablet      11. Dysthymic disorder  F34.1 FLUoxetine (PROZAC) 40 MG capsule     FLUoxetine (PROZAC) 20 MG capsule      12. Mild intermittent asthma without complication  J45.20 albuterol (PROAIR HFA/PROVENTIL HFA/VENTOLIN HFA) 108 (90 Base) MCG/ACT inhaler     Asthma Action Plan (AAP)      13. External hemorrhoids  K64.4 Adult General Surg Referral      14. Urinary frequency  R35.0 UA reflex to Microscopic     Urine Culture Aerobic Bacterial      15. Screening for thyroid disorder  Z13.29 TSH Reflex [facility]      16. Encounter for screening for coronary artery disease  Z13.6 CT Coronary Calcium Scan      17. Personal history of tobacco use  Z87.891 Prof fee: Shared Decision Making for Lung Cancer Screening     CT Chest Lung Cancer Scrn Low Dose wo        1.  Mammogram ordered.  Pap/HPV are up-to-date, last completed 9/10/2021 and were both normal at that time.  Cologuard ordered  "again.  Tdap is up-to-date, last completed 2021.  She declines flu, COVID, pneumococcal and Shingrix vaccines.  2.  See #1.  3.  See #1.  4.  Prescription for penicillin given as above.  5.  Diflucan prescribed as above.  6.  Labs ordered as above.  Also updated ultrasound ordered as well.  If these are all normal, consider EGD.  She does drink a lot of caffeine.  She denies any alcohol use.  7.  Labs as above.  Refill as above.  8.  Refill as above.  9.  Increased fluoxetine to 60 mg daily as she feels that she is not very well controlled at this time.  10.  Stable.  Refill as above.  Labs as above.  11.  See #9.  12.  Stable.  Asthma action plan updated today.  Refills as above.  13.  Referred to general surgery.  14.  UA and UC ordered.  15.  TSH as above.  16.  Referred for coronary calcium score.  If this is normal, consider MRI of cervical spine due to her arm symptoms.  17.  Referred for CT to screen for lung cancer.    Patient has been advised of split billing requirements and indicates understanding: Yes      COUNSELING:  Reviewed preventive health counseling, as reflected in patient instructions       Regular exercise       Healthy diet/nutrition       Vision screening       Aspirin prophylaxis       Alcohol Use       Colorectal Cancer Screening       Consider Hep C screening for all patients one time for ages 18-79 years       HIV screeninx in teen years, 1x in adult years, and at intervals if high risk       Consider lung cancer screening for ages 55-80 years (77 for Medicare) and 20 pack-year smoking history        One time pneumovax for smokers      BMI:   Estimated body mass index is 27.39 kg/m  as calculated from the following:    Height as of this encounter: 1.6 m (5' 3\").    Weight as of this encounter: 70.1 kg (154 lb 9.6 oz).   Weight management plan: Discussed healthy diet and exercise guidelines      She reports that she has been smoking cigarettes. She has a 31.00 pack-year smoking " history. She has never used smokeless tobacco.  Nicotine/Tobacco Cessation Plan:   Information offered: Patient not interested at this time      Jaycee Ochoa MD  [facility]  Answers for HPI/ROS submitted by the patient on 12/22/2022  If you checked off any problems, how difficult have these problems made it for you to do your work, take care of things at home, or get along with other people?: Somewhat difficult  PHQ9 TOTAL SCORE: 6      Lung Cancer Screening Shared Decision Making Visit     Joana Caicedo, a 57 year old female, is eligible for lung cancer screening    History   Smoking Status     Every Day     Packs/day: 1.00     Years: 31.00     Types: Cigarettes   Smokeless Tobacco     Never       I have discussed with patient the risks and benefits of screening for lung cancer with low-dose CT.     The risks include:    radiation exposure: one low dose chest CT has as much ionizing radiation as about 15 chest x-rays, or 6 months of background radiation living in [place]      false positives: most findings/nodules are NOT cancer, but some might still require additional diagnostic evaluation, including biopsy    over-diagnosis: some slow growing cancers that might never have been clinically significant will be detected and treated unnecessarily     The benefit of early detection of lung cancer is contingent upon adherence to annual screening or more frequent follow up if indicated.     Furthermore, to benefit from screening, Joana must be willing and able to undergo diagnostic procedures, if indicated. Although no specific guide is available for determining severity of comorbidities, it is reasonable to withhold screening in patients who have greater mortality risk from other diseases.     We did discuss that the best way to prevent lung cancer is to not smoke.    Some patients may value a numeric estimation of lung cancer risk when evaluating if lung cancer screening is  right for them, here is one calculator:    ShouldIScreen

## 2022-12-22 NOTE — LETTER
My Asthma Action Plan    Name: Joana Caicedo   YOB: 1965  Date: 12/22/2022   My doctor: Jaycee Ochoa MD   My clinic: [facility]        My Rescue Medicine:   Albuterol inhaler (Proair/Ventolin/Proventil HFA)  2-4 puffs EVERY 4 HOURS as needed. Use a spacer if recommended by your provider.   My Asthma Severity:   Intermittent / Exercise Induced  Know your asthma triggers:              GREEN ZONE   Good Control    I feel good    No cough or wheeze    Can work, sleep and play without asthma symptoms       Take your asthma control medicine every day.     1. If exercise triggers your asthma, take your rescue medication    15 minutes before exercise or sports, and    During exercise if you have asthma symptoms  2. Spacer to use with inhaler: If you have a spacer, make sure to use it with your inhaler             YELLOW ZONE Getting Worse  I have ANY of these:    I do not feel good    Cough or wheeze    Chest feels tight    Wake up at night   1. Keep taking your Green Zone medications  2. Start taking your rescue medicine:    every 20 minutes for up to 1 hour. Then every 4 hours for 24-48 hours.  3. If you stay in the Yellow Zone for more than 12-24 hours, contact your doctor.  4. If you do not return to the Green Zone in 12-24 hours or you get worse, start taking your oral steroid medicine if prescribed by your provider.           RED ZONE Medical Alert - Get Help  I have ANY of these:    I feel awful    Medicine is not helping    Breathing getting harder    Trouble walking or talking    Nose opens wide to breathe       1. Take your rescue medicine NOW  2. If your provider has prescribed an oral steroid medicine, start taking it NOW  3. Call your doctor NOW  4. If you are still in the Red Zone after 20 minutes and you have not reached your doctor:    Take your rescue medicine again and    Call 911 or go to the emergency room right away    See your regular doctor within 2  weeks of an Emergency Room or Urgent Care visit for follow-up treatment.          Annual Reminders:  Meet with Asthma Educator,  Flu Shot in the Fall, consider Pneumonia Vaccination for patients with asthma (aged 19 and older).    Pharmacy:    [id number] - [place], [place] - [address] [facility] [address]  [id number] - [place], [place] - [address]  [id number] - [place], [place] - [address] AT [facility] OF  [address]    Electronically signed by Jaycee Ochoa MD   Date: 12/22/22                    Asthma Triggers  How To Control Things That Make Your Asthma Worse    Triggers are things that make your asthma worse.  Look at the list below to help you find your triggers and   what you can do about them. You can help prevent asthma flare-ups by staying away from your triggers.      Trigger                                                          What you can do   Cigarette Smoke  Tobacco smoke can make asthma worse. Do not allow smoking in your home, car or around you.  Be sure no one smokes at a child s day care or school.  If you smoke, ask your health care provider for ways to help you quit.  Ask family members to quit too.  Ask your health care provider for a referral to Quit Plan to help you quit smoking, or call 9-515-262-PLAN.     Colds, Flu, Bronchitis  These are common triggers of asthma. Wash your hands often.  Don t touch your eyes, nose or mouth.  Get a flu shot every year.     Dust Mites  These are tiny bugs that live in cloth or carpet. They are too small to see. Wash sheets and blankets in hot water every week.   Encase pillows and mattress in dust mite proof covers.  Avoid having carpet if you can. If you have carpet, vacuum weekly.   Use a dust mask and HEPA vacuum.   Pollen and Outdoor Mold  Some people are allergic to trees, grass, or weed pollen, or molds. Try to keep your windows closed.  Limit time out doors when pollen count  is high.   Ask you health care provider about taking medicine during allergy season.     Animal Dander  Some people are allergic to skin flakes, urine or saliva from pets with fur or feathers. Keep pets with fur or feathers out of your home.    If you can t keep the pet outdoors, then keep the pet out of your bedroom.  Keep the bedroom door closed.  Keep pets off cloth furniture and away from stuffed toys.     Mice, Rats, and Cockroaches  Some people are allergic to the waste from these pests.   Cover food and garbage.  Clean up spills and food crumbs.  Store grease in the refrigerator.   Keep food out of the bedroom.   Indoor Mold  This can be a trigger if your home has high moisture. Fix leaking faucets, pipes, or other sources of water.   Clean moldy surfaces.  Dehumidify basement if it is damp and smelly.   Smoke, Strong Odors, and Sprays  These can reduce air quality. Stay away from strong odors and sprays, such as perfume, powder, hair spray, paints, smoke incense, paint, cleaning products, candles and new carpet.   Exercise or Sports  Some people with asthma have this trigger. Be active!  Ask your doctor about taking medicine before sports or exercise to prevent symptoms.    Warm up for 5-10 minutes before and after sports or exercise.     Other Triggers of Asthma  Cold air:  Cover your nose and mouth with a scarf.  Sometimes laughing or crying can be a trigger.  Some medicines and food can trigger asthma.

## 2022-12-28 ENCOUNTER — LAB (OUTPATIENT)
Dept: LAB | Facility: OTHER | Age: 57
End: 2022-12-28
Attending: FAMILY MEDICINE
Payer: COMMERCIAL

## 2022-12-28 DIAGNOSIS — R10.13 ABDOMINAL PAIN, EPIGASTRIC: ICD-10-CM

## 2022-12-28 DIAGNOSIS — E78.5 HYPERLIPIDEMIA, UNSPECIFIED HYPERLIPIDEMIA TYPE: ICD-10-CM

## 2022-12-28 DIAGNOSIS — R35.0 URINARY FREQUENCY: ICD-10-CM

## 2022-12-28 DIAGNOSIS — Z13.29 SCREENING FOR THYROID DISORDER: ICD-10-CM

## 2022-12-28 LAB
ALBUMIN SERPL BCG-MCNC: 4.5 G/DL (ref 3.5–5.2)
ALBUMIN UR-MCNC: NEGATIVE MG/DL
ALP SERPL-CCNC: 94 U/L (ref 35–104)
ALT SERPL W P-5'-P-CCNC: 20 U/L (ref 10–35)
ANION GAP SERPL CALCULATED.3IONS-SCNC: 8 MMOL/L (ref 7–15)
APPEARANCE UR: CLEAR
AST SERPL W P-5'-P-CCNC: 16 U/L (ref 10–35)
BASOPHILS # BLD AUTO: 0.1 10E3/UL (ref 0–0.2)
BASOPHILS NFR BLD AUTO: 1 %
BILIRUB SERPL-MCNC: 0.5 MG/DL
BILIRUB UR QL STRIP: NEGATIVE
BUN SERPL-MCNC: 12.8 MG/DL (ref 6–20)
CALCIUM SERPL-MCNC: 9.4 MG/DL (ref 8.6–10)
CHLORIDE SERPL-SCNC: 99 MMOL/L (ref 98–107)
CHOLEST SERPL-MCNC: 221 MG/DL
COLOR UR AUTO: YELLOW
CREAT SERPL-MCNC: 0.65 MG/DL (ref 0.51–0.95)
DEPRECATED HCO3 PLAS-SCNC: 26 MMOL/L (ref 22–29)
EOSINOPHIL # BLD AUTO: 0.2 10E3/UL (ref 0–0.7)
EOSINOPHIL NFR BLD AUTO: 2 %
ERYTHROCYTE [DISTWIDTH] IN BLOOD BY AUTOMATED COUNT: 12.5 % (ref 10–15)
GFR SERPL CREATININE-BSD FRML MDRD: >90 ML/MIN/1.73M2
GLUCOSE SERPL-MCNC: 92 MG/DL (ref 70–99)
GLUCOSE UR STRIP-MCNC: NEGATIVE MG/DL
HCT VFR BLD AUTO: 41.6 % (ref 35–47)
HDLC SERPL-MCNC: 45 MG/DL
HGB BLD-MCNC: 14.1 G/DL (ref 11.7–15.7)
HGB UR QL STRIP: NEGATIVE
IMM GRANULOCYTES # BLD: 0 10E3/UL
IMM GRANULOCYTES NFR BLD: 0 %
KETONES UR STRIP-MCNC: NEGATIVE MG/DL
LDLC SERPL CALC-MCNC: 123 MG/DL
LEUKOCYTE ESTERASE UR QL STRIP: NEGATIVE
LIPASE SERPL-CCNC: 48 U/L (ref 13–60)
LYMPHOCYTES # BLD AUTO: 1.8 10E3/UL (ref 0.8–5.3)
LYMPHOCYTES NFR BLD AUTO: 21 %
MCH RBC QN AUTO: 30.3 PG (ref 26.5–33)
MCHC RBC AUTO-ENTMCNC: 33.9 G/DL (ref 31.5–36.5)
MCV RBC AUTO: 89 FL (ref 78–100)
MONOCYTES # BLD AUTO: 0.5 10E3/UL (ref 0–1.3)
MONOCYTES NFR BLD AUTO: 6 %
NEUTROPHILS # BLD AUTO: 5.8 10E3/UL (ref 1.6–8.3)
NEUTROPHILS NFR BLD AUTO: 70 %
NITRATE UR QL: NEGATIVE
NONHDLC SERPL-MCNC: 176 MG/DL
NRBC # BLD AUTO: 0 10E3/UL
NRBC BLD AUTO-RTO: 0 /100
PH UR STRIP: 6.5 [PH] (ref 5–9)
PLATELET # BLD AUTO: 304 10E3/UL (ref 150–450)
POTASSIUM SERPL-SCNC: 3.7 MMOL/L (ref 3.4–5.3)
PROT SERPL-MCNC: 6.9 G/DL (ref 6.4–8.3)
RBC # BLD AUTO: 4.66 10E6/UL (ref 3.8–5.2)
SODIUM SERPL-SCNC: 133 MMOL/L (ref 136–145)
SP GR UR STRIP: 1 (ref 1–1.03)
TRIGL SERPL-MCNC: 264 MG/DL
TSH SERPL DL<=0.005 MIU/L-ACNC: 1.48 UIU/ML (ref 0.3–4.2)
UROBILINOGEN UR STRIP-MCNC: NORMAL MG/DL
WBC # BLD AUTO: 8.4 10E3/UL (ref 4–11)

## 2022-12-28 PROCEDURE — 83690 ASSAY OF LIPASE: CPT | Mod: ZL

## 2022-12-28 PROCEDURE — 84443 ASSAY THYROID STIM HORMONE: CPT | Mod: ZL

## 2022-12-28 PROCEDURE — 85025 COMPLETE CBC W/AUTO DIFF WBC: CPT | Mod: ZL

## 2022-12-28 PROCEDURE — 80053 COMPREHEN METABOLIC PANEL: CPT | Mod: ZL

## 2022-12-28 PROCEDURE — 87086 URINE CULTURE/COLONY COUNT: CPT | Mod: ZL

## 2022-12-28 PROCEDURE — 81003 URINALYSIS AUTO W/O SCOPE: CPT | Mod: ZL

## 2022-12-28 PROCEDURE — 36415 COLL VENOUS BLD VENIPUNCTURE: CPT | Mod: ZL

## 2022-12-28 PROCEDURE — 80061 LIPID PANEL: CPT | Mod: ZL

## 2022-12-30 LAB — BACTERIA UR CULT: NORMAL

## 2022-12-30 RX ORDER — ROSUVASTATIN CALCIUM 40 MG/1
40 TABLET, COATED ORAL DAILY
Qty: 90 TABLET | Refills: 3 | Status: SHIPPED | OUTPATIENT
Start: 2022-12-30 | End: 2024-02-05

## 2023-01-18 ENCOUNTER — HOSPITAL ENCOUNTER (OUTPATIENT)
Dept: CT IMAGING | Facility: OTHER | Age: 58
Discharge: HOME | End: 2023-01-18
Attending: FAMILY MEDICINE | Admitting: FAMILY MEDICINE

## 2023-01-18 ENCOUNTER — HOSPITAL ENCOUNTER (OUTPATIENT)
Dept: ULTRASOUND IMAGING | Facility: OTHER | Age: 58
Discharge: HOME | End: 2023-01-18
Attending: FAMILY MEDICINE
Payer: COMMERCIAL

## 2023-01-18 ENCOUNTER — HOSPITAL ENCOUNTER (OUTPATIENT)
Dept: CT IMAGING | Facility: OTHER | Age: 58
Discharge: HOME | End: 2023-01-18
Attending: FAMILY MEDICINE
Payer: COMMERCIAL

## 2023-01-18 ENCOUNTER — HOSPITAL ENCOUNTER (OUTPATIENT)
Dept: MAMMOGRAPHY | Facility: OTHER | Age: 58
Discharge: HOME | End: 2023-01-18
Attending: FAMILY MEDICINE
Payer: COMMERCIAL

## 2023-01-18 DIAGNOSIS — Z13.6 ENCOUNTER FOR SCREENING FOR CORONARY ARTERY DISEASE: ICD-10-CM

## 2023-01-18 DIAGNOSIS — Z87.891 PERSONAL HISTORY OF TOBACCO USE: ICD-10-CM

## 2023-01-18 DIAGNOSIS — Z12.31 VISIT FOR SCREENING MAMMOGRAM: ICD-10-CM

## 2023-01-18 DIAGNOSIS — R10.13 ABDOMINAL PAIN, EPIGASTRIC: ICD-10-CM

## 2023-01-18 PROCEDURE — 71271 CT THORAX LUNG CANCER SCR C-: CPT

## 2023-01-18 PROCEDURE — 75571 CT HRT W/O DYE W/CA TEST: CPT

## 2023-01-18 PROCEDURE — 77067 SCR MAMMO BI INCL CAD: CPT

## 2023-01-18 PROCEDURE — 76700 US EXAM ABDOM COMPLETE: CPT

## 2023-01-19 DIAGNOSIS — R29.898 ARM WEAKNESS: Primary | ICD-10-CM

## 2023-01-25 ENCOUNTER — TELEPHONE (OUTPATIENT)
Dept: FAMILY MEDICINE | Facility: OTHER | Age: 58
End: 2023-01-25
Payer: COMMERCIAL

## 2023-01-25 NOTE — TELEPHONE ENCOUNTER
Left message to call back....................  1/25/2023   10:41 AM  Marianela Fermin LPN   1/25/2023  10:41 AM    There was a letter sent out on 1/19/23.

## 2023-01-25 NOTE — TELEPHONE ENCOUNTER
Pt is still waiting for letter about results from tests that were done on 1/18/23.  Please call.    William Paul on 1/25/2023 at 10:24 AM

## 2023-02-01 DIAGNOSIS — F41.9 ANXIETY: ICD-10-CM

## 2023-02-03 ENCOUNTER — HOSPITAL ENCOUNTER (OUTPATIENT)
Dept: MRI IMAGING | Facility: OTHER | Age: 58
Discharge: HOME | End: 2023-02-03
Attending: FAMILY MEDICINE | Admitting: FAMILY MEDICINE
Payer: COMMERCIAL

## 2023-02-03 DIAGNOSIS — R29.898 ARM WEAKNESS: ICD-10-CM

## 2023-02-03 PROCEDURE — 72141 MRI NECK SPINE W/O DYE: CPT

## 2023-02-06 NOTE — TELEPHONE ENCOUNTER
Letter was mailed. Patient should have received letter by now.     Mita Pabon CMA on 2/6/2023 at 1:40 PM

## 2023-02-07 RX ORDER — LORAZEPAM 0.5 MG/1
0.5 TABLET ORAL EVERY 6 HOURS
Qty: 30 TABLET | Refills: 0 | Status: SHIPPED | OUTPATIENT
Start: 2023-02-07 | End: 2023-04-06

## 2023-02-07 NOTE — TELEPHONE ENCOUNTER
Routing refill request to provider for review/approval because:    LOV: 12/22/22    Rehana Hall RN on 2/7/2023 at 10:44 AM

## 2023-02-08 DIAGNOSIS — F41.9 ANXIETY: ICD-10-CM

## 2023-02-08 RX ORDER — LORAZEPAM 0.5 MG/1
0.5 TABLET ORAL EVERY 6 HOURS
Qty: 30 TABLET | Refills: 0 | OUTPATIENT
Start: 2023-02-08

## 2023-02-08 NOTE — TELEPHONE ENCOUNTER
Please see refill encounter, dated 2/1/23.    Patient's chart was accessed to determine the status of a refill request - nothing needed at this time as the request was previously addressed.    Anna Lambert RN .............. 2/8/2023  3:23 PM

## 2023-04-04 DIAGNOSIS — F41.9 ANXIETY: ICD-10-CM

## 2023-04-06 RX ORDER — LORAZEPAM 0.5 MG/1
0.5 TABLET ORAL EVERY 6 HOURS
Qty: 30 TABLET | Refills: 1 | Status: SHIPPED | OUTPATIENT
Start: 2023-04-06 | End: 2024-02-05

## 2023-04-06 NOTE — TELEPHONE ENCOUNTER
Routing refill request to provider for review/approval because:    LOV: 12/22/22    Rehana Hall RN on 4/6/2023 at 12:21 PM

## 2023-06-05 NOTE — PROGRESS NOTES
Assessment & Plan     1. Chronic neck pain  Chronic, persistent.  Discussed treatment options including chiropractic care, physical therapy, muscle relaxers, prescription NSAIDs, continued over-the-counter analgesic use, cortisone injection, referral to spine specialist.  Patient would like to try chiropractic care and muscle relaxer for now.  If minimal improvement will consider cortisone injection both neck and lower back.  - cyclobenzaprine (FLEXERIL) 5 MG tablet; Take 1 tablet (5 mg) by mouth 3 times daily as needed for muscle spasms  Dispense: 30 tablet; Refill: 0    2. Lumbar radiculopathy  3. Chronic bilateral low back pain with left-sided sciatica  Chronic, persistent.  Treatment plan as outlined above.  - cyclobenzaprine (FLEXERIL) 5 MG tablet; Take 1 tablet (5 mg) by mouth 3 times daily as needed for muscle spasms  Dispense: 30 tablet; Refill: 0      Return if symptoms worsen or fail to improve.    Mary Hutchins PA-C  [facility] AND \[facility] [place] [facility]\""    Subjective   Joana is a 58 year old, presenting for the following health issues:  Neck Problem      History of Present Illness       Reason for visit:  I have problems with my neck and lower back    She eats 0-1 servings of fruits and vegetables daily.She consumes 1 sweetened beverage(s) daily.She exercises with enough effort to increase her heart rate 10 to 19 minutes per day.  She exercises with enough effort to increase her heart rate 7 days per week.   She is taking medications regularly.    Today's PHQ-9         PHQ-9 Total Score: 10    PHQ-9 Q9 Thoughts of better off dead/self-harm past 2 weeks :   Not at all    How difficult have these problems made it for you to do your work, take care of things at home, or get along with other people: Somewhat difficult  Today's MAUDE-7 Score: 6     Here for follow-up on chronic neck or back pain.  Patient struggled with lower back pain lumbar radiculopathy for few years.  More recently has been evaluated  for neck pain and right arm weakness in the past couple months. Had cervical MRI completed on 2/2/2023 showing moderate to severe foraminal stenoses present at C5-6.  Had lumbar MRI completed on 6/9/2021 showing moderate left foraminal stenoses at L4-5 and L5-S1.  Slightly asymmetric facet degeneration on the left at L4-5 associated with subtle marrow and adjacent soft tissue edema.  PCP had previously recommended considering cortisone injections but patient has not pursued these as she is nervous.  She is wondering if there are other conservative management she can try prior to considering injections.  She has not met with a chiropractor or physical therapy.  Using ibuprofen and Tylenol frequently throughout the day every day.  Has not been on muscle relaxers or other prescription pain medications.  Has been using heating pad icing which provides minimal improvement.  No associated fever/chills, saddle anesthesia, loss of bowel or bladder control, foot drop.    PAST MEDICAL HISTORY:   Past Medical History:   Diagnosis Date     Chronic cholecystitis with calculus 4/19/2022     Personal history of other medical treatment (CODE)     Demyelination , Per patient, has had mini strokes     Pure hyperglyceridemia     No Comments Provided       PAST SURGICAL HISTORY:   Past Surgical History:   Procedure Laterality Date     APPENDECTOMY OPEN      No Comments Provided     CERCLAGE CERVICAL      x2 with last 2 pregnancies.     LAPAROSCOPIC CHOLECYSTECTOMY N/A 4/28/2022    Procedure: CHOLECYSTECTOMY, LAPAROSCOPIC;  Surgeon: Leonard Francois MD;  Location: [facility] OR     LAPAROSCOPIC TUBAL LIGATION      No Comments Provided       FAMILY HISTORY:   Family History   Problem Relation Age of Onset     Heart Disease Father         Heart Disease     Other - See Comments Father          stage III COPD     Heart Disease Mother         Heart Disease     Other - See Comments Mother          kidney disease     Abdominal Aortic Aneurysm Mother       "   had AAA and thoracic aneurysm     Other - See Comments Other         High cholesterol runs in family     Diabetes Sister         Diabetes     Other - See Comments Sister          fibromyalgia, celiac disease.       SOCIAL HISTORY:   Social History     Tobacco Use     Smoking status: Every Day     Packs/day: 1.00     Years: 31.00     Pack years: 31.00     Types: Cigarettes     Smokeless tobacco: Never   Vaping Use     Vaping status: Never Used     Passive vaping exposure: Yes   Substance Use Topics     Alcohol use: Not Currently     Comment: Alcoholic Drinks/day: 2 per month        Allergies   Allergen Reactions     Amoxicillin Other (See Comments)     Yeast infection       Bupropion      Other reaction(s): Other - Describe In Comment Field  Facial and neck swelling     Niacin      Other reaction(s): Flushing     Sumatriptan      Other reaction(s): Other - Describe In Comment Field  \"burning veins\"     Current Outpatient Medications   Medication     albuterol (PROAIR HFA/PROVENTIL HFA/VENTOLIN HFA) 108 (90 Base) MCG/ACT inhaler     amLODIPine (NORVASC) 5 MG tablet     aspirin 81 MG EC tablet     cyclobenzaprine (FLEXERIL) 5 MG tablet     FLUoxetine (PROZAC) 20 MG capsule     FLUoxetine (PROZAC) 40 MG capsule     hydrochlorothiazide (HYDRODIURIL) 25 MG tablet     lisinopril (ZESTRIL) 20 MG tablet     LORazepam (ATIVAN) 0.5 MG tablet     meclizine (ANTIVERT) 25 MG tablet     rosuvastatin (CRESTOR) 40 MG tablet     valACYclovir (VALTREX) 1000 mg tablet     Vitamin D (Cholecalciferol) 25 MCG (1000 UT) CAPS     No current facility-administered medications for this visit.         Review of Systems   Per HPI        Objective    /72   Pulse 86   Temp 97.2  F (36.2  C)   Resp 14   Ht 1.6 m (5' 3\")   Wt 68.1 kg (150 lb 3.2 oz)   LMP  (LMP Unknown)   SpO2 96%   BMI 26.61 kg/m    Body mass index is 26.61 kg/m .  Physical Exam   General: Pleasant, in no apparent distress.  Musculoskeletal: Back is straight, " tenderness to palpation of paraspinal and paravertebral muscles in lumbar region.  Tenderness palpation throughout posterior cervical region extending into bilateral shoulder regions.  Significantly limited flexion and extension of neck due to pain.  Limited flexion and extension of back due to pain.  Nonantalgic gait in clinic.  Neurologic Exam: Normal gross motor, tone coordination and no visible tremor.  Skin: No jaundice, pallor, rashes, or lesions on exposed skin.   Psych: Appropriate mood and affect.

## 2023-06-06 ENCOUNTER — OFFICE VISIT (OUTPATIENT)
Dept: FAMILY MEDICINE | Facility: OTHER | Age: 58
End: 2023-06-06
Attending: PHYSICIAN ASSISTANT
Payer: COMMERCIAL

## 2023-06-06 VITALS
DIASTOLIC BLOOD PRESSURE: 72 MMHG | RESPIRATION RATE: 14 BRPM | BODY MASS INDEX: 26.61 KG/M2 | HEIGHT: 63 IN | SYSTOLIC BLOOD PRESSURE: 128 MMHG | TEMPERATURE: 97.2 F | HEART RATE: 86 BPM | OXYGEN SATURATION: 96 % | WEIGHT: 150.2 LBS

## 2023-06-06 DIAGNOSIS — G89.29 CHRONIC NECK PAIN: Primary | ICD-10-CM

## 2023-06-06 DIAGNOSIS — G89.29 CHRONIC BILATERAL LOW BACK PAIN WITH LEFT-SIDED SCIATICA: ICD-10-CM

## 2023-06-06 DIAGNOSIS — M54.16 LUMBAR RADICULOPATHY: ICD-10-CM

## 2023-06-06 DIAGNOSIS — M54.2 CHRONIC NECK PAIN: Primary | ICD-10-CM

## 2023-06-06 DIAGNOSIS — M54.42 CHRONIC BILATERAL LOW BACK PAIN WITH LEFT-SIDED SCIATICA: ICD-10-CM

## 2023-06-06 PROCEDURE — 99214 OFFICE O/P EST MOD 30 MIN: CPT | Performed by: PHYSICIAN ASSISTANT

## 2023-06-06 RX ORDER — CYCLOBENZAPRINE HCL 5 MG
5 TABLET ORAL 3 TIMES DAILY PRN
Qty: 30 TABLET | Refills: 0 | Status: SHIPPED | OUTPATIENT
Start: 2023-06-06 | End: 2024-02-05

## 2023-06-06 ASSESSMENT — ANXIETY QUESTIONNAIRES
GAD7 TOTAL SCORE: 6
7. FEELING AFRAID AS IF SOMETHING AWFUL MIGHT HAPPEN: SEVERAL DAYS
IF YOU CHECKED OFF ANY PROBLEMS ON THIS QUESTIONNAIRE, HOW DIFFICULT HAVE THESE PROBLEMS MADE IT FOR YOU TO DO YOUR WORK, TAKE CARE OF THINGS AT HOME, OR GET ALONG WITH OTHER PEOPLE: SOMEWHAT DIFFICULT
8. IF YOU CHECKED OFF ANY PROBLEMS, HOW DIFFICULT HAVE THESE MADE IT FOR YOU TO DO YOUR WORK, TAKE CARE OF THINGS AT HOME, OR GET ALONG WITH OTHER PEOPLE?: SOMEWHAT DIFFICULT
GAD7 TOTAL SCORE: 6
5. BEING SO RESTLESS THAT IT IS HARD TO SIT STILL: NOT AT ALL
4. TROUBLE RELAXING: SEVERAL DAYS
7. FEELING AFRAID AS IF SOMETHING AWFUL MIGHT HAPPEN: SEVERAL DAYS
1. FEELING NERVOUS, ANXIOUS, OR ON EDGE: SEVERAL DAYS
GAD7 TOTAL SCORE: 6
6. BECOMING EASILY ANNOYED OR IRRITABLE: SEVERAL DAYS
2. NOT BEING ABLE TO STOP OR CONTROL WORRYING: SEVERAL DAYS
3. WORRYING TOO MUCH ABOUT DIFFERENT THINGS: SEVERAL DAYS

## 2023-06-06 ASSESSMENT — PATIENT HEALTH QUESTIONNAIRE - PHQ9
10. IF YOU CHECKED OFF ANY PROBLEMS, HOW DIFFICULT HAVE THESE PROBLEMS MADE IT FOR YOU TO DO YOUR WORK, TAKE CARE OF THINGS AT HOME, OR GET ALONG WITH OTHER PEOPLE: SOMEWHAT DIFFICULT
SUM OF ALL RESPONSES TO PHQ QUESTIONS 1-9: 10
SUM OF ALL RESPONSES TO PHQ QUESTIONS 1-9: 10

## 2023-06-06 ASSESSMENT — PAIN SCALES - GENERAL: PAINLEVEL: MILD PAIN (3)

## 2023-06-06 NOTE — NURSING NOTE
Patient presents to clinic for stiff and sore neck.  Haley Ortiz LPN ....................  6/6/2023   3:03 PM

## 2023-06-07 ASSESSMENT — ASTHMA QUESTIONNAIRES
ACT_TOTALSCORE: 22
QUESTION_5 LAST FOUR WEEKS HOW WOULD YOU RATE YOUR ASTHMA CONTROL: WELL CONTROLLED
ACT_TOTALSCORE: 22
QUESTION_4 LAST FOUR WEEKS HOW OFTEN HAVE YOU USED YOUR RESCUE INHALER OR NEBULIZER MEDICATION (SUCH AS ALBUTEROL): ONCE A WEEK OR LESS
QUESTION_3 LAST FOUR WEEKS HOW OFTEN DID YOUR ASTHMA SYMPTOMS (WHEEZING, COUGHING, SHORTNESS OF BREATH, CHEST TIGHTNESS OR PAIN) WAKE YOU UP AT NIGHT OR EARLIER THAN USUAL IN THE MORNING: NOT AT ALL
QUESTION_2 LAST FOUR WEEKS HOW OFTEN HAVE YOU HAD SHORTNESS OF BREATH: ONCE OR TWICE A WEEK
ACUTE_EXACERBATION_TODAY: NO
QUESTION_1 LAST FOUR WEEKS HOW MUCH OF THE TIME DID YOUR ASTHMA KEEP YOU FROM GETTING AS MUCH DONE AT WORK, SCHOOL OR AT HOME: NONE OF THE TIME

## 2023-06-16 ENCOUNTER — TELEPHONE (OUTPATIENT)
Dept: FAMILY MEDICINE | Facility: OTHER | Age: 58
End: 2023-06-16
Payer: COMMERCIAL

## 2023-06-16 DIAGNOSIS — M54.2 CHRONIC NECK PAIN: Primary | ICD-10-CM

## 2023-06-16 DIAGNOSIS — G89.29 CHRONIC BILATERAL LOW BACK PAIN WITH LEFT-SIDED SCIATICA: ICD-10-CM

## 2023-06-16 DIAGNOSIS — G89.29 CHRONIC NECK PAIN: Primary | ICD-10-CM

## 2023-06-16 DIAGNOSIS — M54.42 CHRONIC BILATERAL LOW BACK PAIN WITH LEFT-SIDED SCIATICA: ICD-10-CM

## 2023-06-16 NOTE — TELEPHONE ENCOUNTER
Discontinue Flexeril.  New prescription for tizanidine to be taken as needed.    Mary Hutchins PA-C on 6/16/2023 at 11:18 AM

## 2023-06-16 NOTE — TELEPHONE ENCOUNTER
After confirming last name and birthday, informed patient of recommendations and new prescription sent to pharmacy. Yael Chaudhry on 6/16/2023 at 12:00 PM

## 2023-06-16 NOTE — TELEPHONE ENCOUNTER
Patient was prescribed cyclobenzaprine and it is giving her headaches. Is there something else she can take.        ;Rita Alvarez on 6/16/2023 at 9:11am    [facility]

## 2023-12-18 DIAGNOSIS — E78.5 HYPERLIPIDEMIA, UNSPECIFIED HYPERLIPIDEMIA TYPE: ICD-10-CM

## 2023-12-18 DIAGNOSIS — F34.1 DYSTHYMIC DISORDER: ICD-10-CM

## 2023-12-18 DIAGNOSIS — I10 ESSENTIAL HYPERTENSION: ICD-10-CM

## 2023-12-18 DIAGNOSIS — F41.9 ANXIETY: ICD-10-CM

## 2023-12-21 RX ORDER — HYDROCHLOROTHIAZIDE 25 MG/1
25 TABLET ORAL DAILY
Qty: 90 TABLET | Refills: 3 | Status: SHIPPED | OUTPATIENT
Start: 2023-12-21 | End: 2024-02-05

## 2023-12-21 RX ORDER — FLUOXETINE 40 MG/1
40 CAPSULE ORAL DAILY
Qty: 90 CAPSULE | Refills: 3 | Status: SHIPPED | OUTPATIENT
Start: 2023-12-21 | End: 2024-02-05

## 2023-12-21 RX ORDER — LISINOPRIL 20 MG/1
20 TABLET ORAL DAILY
Qty: 90 TABLET | Refills: 3 | Status: SHIPPED | OUTPATIENT
Start: 2023-12-21 | End: 2024-02-05

## 2023-12-21 RX ORDER — ROSUVASTATIN CALCIUM 20 MG/1
20 TABLET, COATED ORAL DAILY
Qty: 90 TABLET | Refills: 3 | Status: SHIPPED | OUTPATIENT
Start: 2023-12-21 | End: 2024-02-05

## 2023-12-21 NOTE — TELEPHONE ENCOUNTER
[id number] [place] sent Rx request for the following:      Requested Prescriptions   Pending Prescriptions Disp Refills    rosuvastatin (CRESTOR) 20 MG tablet [Pharmacy Med Name: ROSUVASTATIN 20MG TABLET] 90 tablet 3     Sig: TAKE 1 TABLET (20 MG) BY MOUTH DAILY       Statins Protocol Passed - 12/18/2023  1:01 AM   Previous dose 40 mg  Last Prescription Date:   12/30/22  Last Fill Qty/Refills:         90, R-3           lisinopril (ZESTRIL) 20 MG tablet [Pharmacy Med Name: LISINOPRIL 20MG TABLET] 90 tablet 3     Sig: TAKE 1 TABLET (20 MG) BY MOUTH DAILY       ACE Inhibitors (Including Combos) Protocol Passed - 12/18/2023  1:01 AM     Last Prescription Date:   12/22/22  Last Fill Qty/Refills:         90, R-3        FLUoxetine (PROZAC) 40 MG capsule [Pharmacy Med Name: FLUOXETINE 40MG CAPSULE] 90 capsule 3     Sig: TAKE 1 CAPSULE (40 MG) BY MOUTH DAILY WITH 20 MG TO EQUAL 60 MG DAILY.       SSRIs Protocol Failed - 12/18/2023  1:01 AM        Failed - PHQ-9 score less than 5 in past 6 months     Please review last PHQ-9 score.           Failed - Recent (6 mo) or future (30 days) visit within the authorizing provider's specialty     Last Prescription Date:   12/22/22  Last Fill Qty/Refills:         90, R-3           hydrochlorothiazide (HYDRODIURIL) 25 MG tablet [Pharmacy Med Name: HYDROCHLOROTHIAZIDE 25MG TAB] 90 tablet 3     Sig: TAKE 1 TABLET (25 MG) BY MOUTH DAILY       Diuretics (Including Combos) Protocol Failed - 12/18/2023  1:01 AM        Failed - Normal serum sodium on file in past 12 months     Recent Labs   Lab Test 12/28/22  1401   *         Last Prescription Date:   12/22/22  Last Fill Qty/Refills:         90, R-3             FLUoxetine (PROZAC) 20 MG capsule [Pharmacy Med Name: FLUOXETINE 20MG CAPSULE] 90 capsule 3     Sig: TAKE 1 CAPSULE (20 MG) BY MOUTH DAILY WITH 40 MG TO EQUAL 60 MG DAILY.       SSRIs Protocol Failed - 12/18/2023  1:01 AM        Failed - PHQ-9 score less  than 5 in past 6 months     Please review last PHQ-9 score.           Failed - Recent (6 mo) or future (30 days) visit within the authorizing provider's specialty       Last Prescription Date:   12/22/22  Last Fill Qty/Refills:         90, R-3    Last Office Visit:              12/22/22   Future Office visit:           none    Routed to unit 4 schedulers, patient needs yearly physical.    Routing refill request to provider for review/approval because:  Drug not on the [facility] refill protocol   Patient needs to be seen because it has been more than 1 year since last office visit.    Dayna Magallon RN on 12/21/2023 at 8:31 AM

## 2024-02-05 ENCOUNTER — OFFICE VISIT (OUTPATIENT)
Dept: FAMILY MEDICINE | Facility: OTHER | Age: 59
End: 2024-02-05
Attending: FAMILY MEDICINE
Payer: COMMERCIAL

## 2024-02-05 VITALS
TEMPERATURE: 98.2 F | WEIGHT: 151.4 LBS | HEART RATE: 60 BPM | BODY MASS INDEX: 26.82 KG/M2 | RESPIRATION RATE: 18 BRPM | SYSTOLIC BLOOD PRESSURE: 140 MMHG | DIASTOLIC BLOOD PRESSURE: 92 MMHG | OXYGEN SATURATION: 97 %

## 2024-02-05 DIAGNOSIS — Z87.891 PERSONAL HISTORY OF TOBACCO USE, PRESENTING HAZARDS TO HEALTH: ICD-10-CM

## 2024-02-05 DIAGNOSIS — F34.1 DYSTHYMIC DISORDER: ICD-10-CM

## 2024-02-05 DIAGNOSIS — Z13.29 SCREENING FOR THYROID DISORDER: ICD-10-CM

## 2024-02-05 DIAGNOSIS — R42 DIZZINESS: ICD-10-CM

## 2024-02-05 DIAGNOSIS — E78.5 HYPERLIPIDEMIA, UNSPECIFIED HYPERLIPIDEMIA TYPE: ICD-10-CM

## 2024-02-05 DIAGNOSIS — Z12.31 VISIT FOR SCREENING MAMMOGRAM: ICD-10-CM

## 2024-02-05 DIAGNOSIS — I10 ESSENTIAL HYPERTENSION: ICD-10-CM

## 2024-02-05 DIAGNOSIS — Z86.19 H/O COLD SORES: ICD-10-CM

## 2024-02-05 DIAGNOSIS — Z12.11 SCREEN FOR COLON CANCER: ICD-10-CM

## 2024-02-05 DIAGNOSIS — R20.2 PARESTHESIAS IN LEFT HAND: Primary | ICD-10-CM

## 2024-02-05 DIAGNOSIS — Z13.0 SCREENING FOR DEFICIENCY ANEMIA: ICD-10-CM

## 2024-02-05 DIAGNOSIS — F41.9 ANXIETY: ICD-10-CM

## 2024-02-05 DIAGNOSIS — E55.9 VITAMIN D DEFICIENCY: ICD-10-CM

## 2024-02-05 DIAGNOSIS — J45.20 MILD INTERMITTENT ASTHMA WITHOUT COMPLICATION: ICD-10-CM

## 2024-02-05 PROCEDURE — 99214 OFFICE O/P EST MOD 30 MIN: CPT | Performed by: FAMILY MEDICINE

## 2024-02-05 RX ORDER — VALACYCLOVIR HYDROCHLORIDE 1 G/1
TABLET, FILM COATED ORAL
Qty: 30 TABLET | Refills: 8 | Status: SHIPPED | OUTPATIENT
Start: 2024-02-05

## 2024-02-05 RX ORDER — FLUOXETINE 40 MG/1
40 CAPSULE ORAL DAILY
Qty: 90 CAPSULE | Refills: 3 | Status: SHIPPED | OUTPATIENT
Start: 2024-02-05 | End: 2024-08-01 | Stop reason: SINTOL

## 2024-02-05 RX ORDER — GABAPENTIN 100 MG/1
100 CAPSULE ORAL 3 TIMES DAILY
Qty: 270 CAPSULE | Refills: 4 | Status: SHIPPED | OUTPATIENT
Start: 2024-02-05

## 2024-02-05 RX ORDER — HYDROCHLOROTHIAZIDE 25 MG/1
25 TABLET ORAL DAILY
Qty: 90 TABLET | Refills: 3 | Status: SHIPPED | OUTPATIENT
Start: 2024-02-05

## 2024-02-05 RX ORDER — LORAZEPAM 0.5 MG/1
0.5 TABLET ORAL EVERY 6 HOURS
Qty: 30 TABLET | Refills: 1 | Status: SHIPPED | OUTPATIENT
Start: 2024-02-05 | End: 2024-08-26

## 2024-02-05 RX ORDER — LISINOPRIL 20 MG/1
20 TABLET ORAL DAILY
Qty: 90 TABLET | Refills: 3 | Status: SHIPPED | OUTPATIENT
Start: 2024-02-05

## 2024-02-05 RX ORDER — ALBUTEROL SULFATE 90 UG/1
2 AEROSOL, METERED RESPIRATORY (INHALATION) EVERY 4 HOURS PRN
Qty: 18 G | Refills: 11 | Status: SHIPPED | OUTPATIENT
Start: 2024-02-05

## 2024-02-05 RX ORDER — VARENICLINE TARTRATE 1 MG/1
1 TABLET, FILM COATED ORAL 2 TIMES DAILY
Qty: 180 TABLET | Refills: 3 | Status: SHIPPED | OUTPATIENT
Start: 2024-02-05

## 2024-02-05 RX ORDER — ROSUVASTATIN CALCIUM 20 MG/1
20 TABLET, COATED ORAL DAILY
Qty: 90 TABLET | Refills: 3 | Status: SHIPPED | OUTPATIENT
Start: 2024-02-05

## 2024-02-05 RX ORDER — MECLIZINE HYDROCHLORIDE 25 MG/1
25 TABLET ORAL 3 TIMES DAILY PRN
Qty: 30 TABLET | Refills: 11 | Status: SHIPPED | OUTPATIENT
Start: 2024-02-05

## 2024-02-05 RX ORDER — VARENICLINE TARTRATE 0.5 (11)-1
KIT ORAL
Qty: 53 TABLET | Refills: 0 | Status: SHIPPED | OUTPATIENT
Start: 2024-02-05

## 2024-02-05 SDOH — HEALTH STABILITY: PHYSICAL HEALTH: ON AVERAGE, HOW MANY DAYS PER WEEK DO YOU ENGAGE IN MODERATE TO STRENUOUS EXERCISE (LIKE A BRISK WALK)?: 3 DAYS

## 2024-02-05 SDOH — HEALTH STABILITY: PHYSICAL HEALTH: ON AVERAGE, HOW MANY MINUTES DO YOU ENGAGE IN EXERCISE AT THIS LEVEL?: 10 MIN

## 2024-02-05 ASSESSMENT — PATIENT HEALTH QUESTIONNAIRE - PHQ9
SUM OF ALL RESPONSES TO PHQ QUESTIONS 1-9: 4
SUM OF ALL RESPONSES TO PHQ QUESTIONS 1-9: 4
10. IF YOU CHECKED OFF ANY PROBLEMS, HOW DIFFICULT HAVE THESE PROBLEMS MADE IT FOR YOU TO DO YOUR WORK, TAKE CARE OF THINGS AT HOME, OR GET ALONG WITH OTHER PEOPLE: SOMEWHAT DIFFICULT

## 2024-02-05 ASSESSMENT — ASTHMA QUESTIONNAIRES
QUESTION_5 LAST FOUR WEEKS HOW WOULD YOU RATE YOUR ASTHMA CONTROL: WELL CONTROLLED
QUESTION_1 LAST FOUR WEEKS HOW MUCH OF THE TIME DID YOUR ASTHMA KEEP YOU FROM GETTING AS MUCH DONE AT WORK, SCHOOL OR AT HOME: NONE OF THE TIME
QUESTION_2 LAST FOUR WEEKS HOW OFTEN HAVE YOU HAD SHORTNESS OF BREATH: ONCE OR TWICE A WEEK
ACT_TOTALSCORE: 21
QUESTION_3 LAST FOUR WEEKS HOW OFTEN DID YOUR ASTHMA SYMPTOMS (WHEEZING, COUGHING, SHORTNESS OF BREATH, CHEST TIGHTNESS OR PAIN) WAKE YOU UP AT NIGHT OR EARLIER THAN USUAL IN THE MORNING: NOT AT ALL
QUESTION_4 LAST FOUR WEEKS HOW OFTEN HAVE YOU USED YOUR RESCUE INHALER OR NEBULIZER MEDICATION (SUCH AS ALBUTEROL): TWO OR THREE TIMES PER WEEK
ACT_TOTALSCORE: 21

## 2024-02-05 ASSESSMENT — PAIN SCALES - GENERAL: PAINLEVEL: SEVERE PAIN (6)

## 2024-02-05 ASSESSMENT — SOCIAL DETERMINANTS OF HEALTH (SDOH): HOW OFTEN DO YOU GET TOGETHER WITH FRIENDS OR RELATIVES?: ONCE A WEEK

## 2024-02-05 NOTE — PROGRESS NOTES
Nursing Notes:   Mita Jones LPN  2024  3:02 PM  Signed  Chief Complaint   Patient presents with    Recheck Medication         Medication Reconciliation: complete    Mita Jones LPN          Alexsander Bernard is a 58 year old, presenting for the following health issues:  Recheck Medication        2024     3:00 PM   Additional Questions   Roomed by Mita Jones     History of Present Illness       Reason for visit:  Med check and to let the Doctor know that the chiropractir wants an EMG done on my left arm Also to see about getting on some form of medication to help with the nerve ain and horrible tinkling going on constantly up and down my left arm and around my nec    She eats 0-1 servings of fruits and vegetables daily.She consumes 2 sweetened beverage(s) daily. She is missing 1 dose(s) of medications per week.       Has had some back and neck issues.  She had started seeing Dr. [place] for Chiropractor treatment.  He has had a difficult time adjusting her.  She has a lot of pain in her left SI joint region.  He recommended that she have an EMG in her left arm.  She has a constant shooting pain in her left arm. It affects her entire arm from the shoulder down.  Sometimes her right arm as well.  Has a tingling sensation in her entire left hand.  Occasionally in her right hand as well.  No weakness of her  strength.  He had also recommended trying gabapentin to see if this would help.  A good friend  at 48 of an MI.    She needs some medication refills.             2023     7:27 AM 2024     3:22 PM   ACT Total Scores   ACT TOTAL SCORE (Goal Greater than or Equal to 20) 22 21   In the past 12 months, how many times did you visit the emergency room for your asthma without being admitted to the hospital? 0 0   In the past 12 months, how many times were you hospitalized overnight because of your asthma? 0 0         2022     2:56 PM 2023     2:50 PM 2024     3:12 PM    PHQ   PHQ-9 Total Score 6 10 4   Q9: Thoughts of better off dead/self-harm past 2 weeks Not at all Not at all Not at all         8/2/2021     9:17 AM 9/10/2021     2:24 PM 6/6/2023     2:52 PM   MAUDE-7 SCORE   Total Score 6 (mild anxiety) 10 (moderate anxiety) 6 (mild anxiety)   Total Score 6 10 6             Med Check         Review of Systems  Constitutional, HEENT, cardiovascular, pulmonary, GI, , musculoskeletal, neuro, skin, endocrine and psych systems are negative, except as otherwise noted.      Objective    BP (!) 140/92   Pulse 60   Temp 98.2  F (36.8  C) (Tympanic)   Resp 18   Wt 68.7 kg (151 lb 6.4 oz)   LMP  (LMP Unknown)   SpO2 97%   Breastfeeding No   BMI 26.82 kg/m    Body mass index is 26.82 kg/m .  Physical Exam  Constitutional:       Appearance: Normal appearance.   HENT:      Head: Normocephalic.      Right Ear: Tympanic membrane normal.      Left Ear: Tympanic membrane normal.      Nose: Nose normal. No congestion.   Eyes:      Extraocular Movements: Extraocular movements intact.      Pupils: Pupils are equal, round, and reactive to light.   Cardiovascular:      Rate and Rhythm: Normal rate and regular rhythm.      Heart sounds: Normal heart sounds. No murmur heard.  Pulmonary:      Effort: Pulmonary effort is normal.      Breath sounds: Normal breath sounds. No wheezing, rhonchi or rales.   Abdominal:      General: Abdomen is flat.      Palpations: Abdomen is soft.   Musculoskeletal:         General: Normal range of motion.      Cervical back: Normal range of motion and neck supple.      Right lower leg: No edema.      Left lower leg: No edema.   Lymphadenopathy:      Cervical: No cervical adenopathy.   Skin:     General: Skin is warm.      Capillary Refill: Capillary refill takes less than 2 seconds.      Findings: No rash.   Neurological:      General: No focal deficit present.      Mental Status: She is alert and oriented to person, place, and time.      Comments: Tiffany's positive  left > right.  Phalen's negative bilaterally.  Palpation over ulnar groove reproduces symptoms on the left, but not on the right.   Psychiatric:         Mood and Affect: Mood normal.         Behavior: Behavior normal.          ICD-10-CM    1. Paresthesias in left hand  R20.2 Adult Neurology  Referral     gabapentin (NEURONTIN) 100 MG capsule      2. Essential hypertension  I10 Aldosterone     Renin activity     Aldosterone Renin Ratio     Comprehensive metabolic panel     hydrochlorothiazide (HYDRODIURIL) 25 MG tablet     lisinopril (ZESTRIL) 20 MG tablet      3. Hyperlipidemia, unspecified hyperlipidemia type  E78.5 Comprehensive metabolic panel     Lipid Profile     rosuvastatin (CRESTOR) 20 MG tablet      4. Screen for colon cancer  Z12.11 Colonoscopy Screening  Referral      5. Visit for screening mammogram  Z12.31 MA Screen Bilateral w/Braxton      6. Personal history of tobacco use, presenting hazards to health  Z87.891 varenicline (CHANTIX ROLANDA) 0.5 MG X 11 & 1 MG X 42 tablet     varenicline (CHANTIX) 1 MG tablet      7. Screening for deficiency anemia  Z13.0 CBC with Platelets & Differential      8. Screening for thyroid disorder  Z13.29 TSH with free T4 reflex      9. Mild intermittent asthma without complication  J45.20 albuterol (PROAIR HFA/PROVENTIL HFA/VENTOLIN HFA) 108 (90 Base) MCG/ACT inhaler      10. Dysthymic disorder  F34.1 FLUoxetine (PROZAC) 20 MG capsule     FLUoxetine (PROZAC) 40 MG capsule      11. Anxiety  F41.9 FLUoxetine (PROZAC) 20 MG capsule     LORazepam (ATIVAN) 0.5 MG tablet      12. Dizziness  R42 meclizine (ANTIVERT) 25 MG tablet      13. H/O cold sores  Z86.19 valACYclovir (VALTREX) 1000 mg tablet      14. Vitamin D deficiency  E55.9 Vitamin D Deficiency           EMG ordered of her left upper extremity.  Declines checking her right as well at this time.  Trial of gabapentin 100 mg three times a day as well.  Blood pressure slightly elevated.  Refills as above.  Labs  ordered as noted.  Rosuvastatin refilled.  Labs as above.  Colonoscopy ordered as above.  Mammogram ordered.  Encouraged her to quit smoking.  She feels ready.  Interested in trying chantix.  Prescription provided.  Complete Blood Count ordered as above.  TSH ordered as above.  Albuterol refilled.  Fluoxetine refilled.  See #10.  Meclizine refilled.  Valtrex refilled.  Vitamin D level ordered as well.    She declines covid, prevnar 20, Shingrix, flu shot.  Declined physical she was scheduled for as well.    Return for Make a fasting lab appointment.         Jaycee Ochoa MD

## 2024-02-05 NOTE — LETTER
My Asthma Action Plan    Name: Joana Caicedo   YOB: 1965  Date: 2/5/2024   My doctor: Jaycee Ochoa MD   My clinic: [facility]        My Rescue Medicine:   Albuterol inhaler (Proair/Ventolin/Proventil HFA)  2-4 puffs EVERY 4 HOURS as needed. Use a spacer if recommended by your provider.   My Asthma Severity:   Intermittent / Exercise Induced  Know your asthma triggers: Patient is unaware of triggers             GREEN ZONE   Good Control  I feel good  No cough or wheeze  Can work, sleep and play without asthma symptoms       Take your asthma control medicine every day.     If exercise triggers your asthma, take your rescue medication  15 minutes before exercise or sports, and  During exercise if you have asthma symptoms  Spacer to use with inhaler: If you have a spacer, make sure to use it with your inhaler             YELLOW ZONE Getting Worse  I have ANY of these:  I do not feel good  Cough or wheeze  Chest feels tight  Wake up at night   Keep taking your Green Zone medications  Start taking your rescue medicine:  every 20 minutes for up to 1 hour. Then every 4 hours for 24-48 hours.  If you stay in the Yellow Zone for more than 12-24 hours, contact your doctor.  If you do not return to the Green Zone in 12-24 hours or you get worse, start taking your oral steroid medicine if prescribed by your provider.           RED ZONE Medical Alert - Get Help  I have ANY of these:  I feel awful  Medicine is not helping  Breathing getting harder  Trouble walking or talking  Nose opens wide to breathe       Take your rescue medicine NOW  If your provider has prescribed an oral steroid medicine, start taking it NOW  Call your doctor NOW  If you are still in the Red Zone after 20 minutes and you have not reached your doctor:  Take your rescue medicine again and  Call 911 or go to the emergency room right away    See your regular doctor within 2 weeks of an Emergency Room or  Urgent Care visit for follow-up treatment.          Annual Reminders:  Meet with Asthma Educator,  Flu Shot in the Fall, consider Pneumonia Vaccination for patients with asthma (aged 19 and older).    Pharmacy: [id number] - [facility] [address]    Electronically signed by Jaycee Ochoa MD   Date: 02/05/24                    Asthma Triggers  How To Control Things That Make Your Asthma Worse    Triggers are things that make your asthma worse.  Look at the list below to help you find your triggers and   what you can do about them. You can help prevent asthma flare-ups by staying away from your triggers.      Trigger                                                          What you can do   Cigarette Smoke  Tobacco smoke can make asthma worse. Do not allow smoking in your home, car or around you.  Be sure no one smokes at a child s day care or school.  If you smoke, ask your health care provider for ways to help you quit.  Ask family members to quit too.  Ask your health care provider for a referral to Quit Plan to help you quit smoking, or call 4-123-692-PLAN.     Colds, Flu, Bronchitis  These are common triggers of asthma. Wash your hands often.  Don t touch your eyes, nose or mouth.  Get a flu shot every year.     Dust Mites  These are tiny bugs that live in cloth or carpet. They are too small to see. Wash sheets and blankets in hot water every week.   Encase pillows and mattress in dust mite proof covers.  Avoid having carpet if you can. If you have carpet, vacuum weekly.   Use a dust mask and HEPA vacuum.   Pollen and Outdoor Mold  Some people are allergic to trees, grass, or weed pollen, or molds. Try to keep your windows closed.  Limit time out doors when pollen count is high.   Ask you health care provider about taking medicine during allergy season.     Animal Dander  Some people are allergic to skin flakes, urine or saliva from pets with fur or feathers. Keep pets  with fur or feathers out of your home.    If you can t keep the pet outdoors, then keep the pet out of your bedroom.  Keep the bedroom door closed.  Keep pets off cloth furniture and away from stuffed toys.     Mice, Rats, and Cockroaches  Some people are allergic to the waste from these pests.   Cover food and garbage.  Clean up spills and food crumbs.  Store grease in the refrigerator.   Keep food out of the bedroom.   Indoor Mold  This can be a trigger if your home has high moisture. Fix leaking faucets, pipes, or other sources of water.   Clean moldy surfaces.  Dehumidify basement if it is damp and smelly.   Smoke, Strong Odors, and Sprays  These can reduce air quality. Stay away from strong odors and sprays, such as perfume, powder, hair spray, paints, smoke incense, paint, cleaning products, candles and new carpet.   Exercise or Sports  Some people with asthma have this trigger. Be active!  Ask your doctor about taking medicine before sports or exercise to prevent symptoms.    Warm up for 5-10 minutes before and after sports or exercise.     Other Triggers of Asthma  Cold air:  Cover your nose and mouth with a scarf.  Sometimes laughing or crying can be a trigger.  Some medicines and food can trigger asthma.

## 2024-02-05 NOTE — NURSING NOTE
Chief Complaint   Patient presents with    Recheck Medication         Medication Reconciliation: complete    Mita Jones, LPN

## 2024-02-06 ENCOUNTER — HOSPITAL ENCOUNTER (OUTPATIENT)
Facility: OTHER | Age: 59
End: 2024-02-06
Attending: SURGERY | Admitting: SURGERY
Payer: COMMERCIAL

## 2024-02-06 DIAGNOSIS — Z12.11 ENCOUNTER FOR SCREENING COLONOSCOPY: Primary | ICD-10-CM

## 2024-02-06 RX ORDER — POLYETHYLENE GLYCOL 3350, SODIUM CHLORIDE, SODIUM BICARBONATE, POTASSIUM CHLORIDE 420; 11.2; 5.72; 1.48 G/4L; G/4L; G/4L; G/4L
4000 POWDER, FOR SOLUTION ORAL ONCE
Qty: 4000 ML | Refills: 0 | Status: SHIPPED | OUTPATIENT
Start: 2024-04-05 | End: 2024-04-05

## 2024-02-06 RX ORDER — BISACODYL 5 MG/1
TABLET, DELAYED RELEASE ORAL
Qty: 2 TABLET | Refills: 0 | Status: SHIPPED | OUTPATIENT
Start: 2024-04-05

## 2024-02-06 NOTE — TELEPHONE ENCOUNTER
Screening Questions for the Scheduling of Screening Colonoscopies   (If Colonoscopy is diagnostic, Provider should review the chart before scheduling.)  Are you younger than 45 or older than 80?  NO  Do you take aspirin or fish oil?  ASPIRIN (if yes, tell patient to stop 1 week prior to Colonoscopy)  Do you take warfarin (Coumadin), clopidogrel (Plavix), apixaban (Eliquis), dabigatram (Pradaxa), rivaroxaban (Xarelto) or any blood thinner? NO  Do you take Ozempic? NO  Do you use oxygen or a CPAP at home?  NO  Do you have kidney disease? NO  Are you on dialysis? NO  Have you had a stroke or heart attack in the last year? NO  Have you had a stent in your heart or any blood vessel in the last year? NO  Have you had a transplant of any organ? NO  Have you had a colonoscopy or upper endoscopy (EGD) before? NO  Date of scheduled Colonoscopy. 04/12/2024  Provider SY  [facility]

## 2024-02-11 ENCOUNTER — TRANSFERRED RECORDS (OUTPATIENT)
Dept: MULTI SPECIALTY CLINIC | Facility: CLINIC | Age: 59
End: 2024-02-11

## 2024-03-05 DIAGNOSIS — I10 ESSENTIAL HYPERTENSION: ICD-10-CM

## 2024-03-06 ENCOUNTER — LAB (OUTPATIENT)
Dept: LAB | Facility: OTHER | Age: 59
End: 2024-03-06
Attending: FAMILY MEDICINE
Payer: COMMERCIAL

## 2024-03-06 DIAGNOSIS — Z13.29 SCREENING FOR THYROID DISORDER: ICD-10-CM

## 2024-03-06 DIAGNOSIS — Z13.0 SCREENING FOR DEFICIENCY ANEMIA: ICD-10-CM

## 2024-03-06 DIAGNOSIS — E55.9 VITAMIN D DEFICIENCY: ICD-10-CM

## 2024-03-06 LAB
BASOPHILS # BLD AUTO: 0.1 10E3/UL (ref 0–0.2)
BASOPHILS NFR BLD AUTO: 1 %
EOSINOPHIL # BLD AUTO: 0.2 10E3/UL (ref 0–0.7)
EOSINOPHIL NFR BLD AUTO: 2 %
ERYTHROCYTE [DISTWIDTH] IN BLOOD BY AUTOMATED COUNT: 12.4 % (ref 10–15)
HCT VFR BLD AUTO: 43.9 % (ref 35–47)
HGB BLD-MCNC: 15.1 G/DL (ref 11.7–15.7)
IMM GRANULOCYTES # BLD: 0 10E3/UL
IMM GRANULOCYTES NFR BLD: 0 %
LYMPHOCYTES # BLD AUTO: 1.6 10E3/UL (ref 0–5.3)
LYMPHOCYTES NFR BLD AUTO: 19 %
MCH RBC QN AUTO: 30.5 PG (ref 26.5–33)
MCHC RBC AUTO-ENTMCNC: 34.4 G/DL (ref 31.5–36.5)
MCV RBC AUTO: 89 FL (ref 78–100)
MONOCYTES # BLD AUTO: 0.5 10E3/UL (ref 0–1.3)
MONOCYTES NFR BLD AUTO: 5 %
NEUTROPHILS # BLD AUTO: 6.2 10E3/UL (ref 1.6–8.3)
NEUTROPHILS NFR BLD AUTO: 73 %
NRBC # BLD AUTO: 0 10E3/UL
NRBC BLD AUTO-RTO: 0 /100
PLATELET # BLD AUTO: 298 10E3/UL (ref 150–450)
RBC # BLD AUTO: 4.95 10E6/UL (ref 3.8–5.2)
TSH SERPL DL<=0.005 MIU/L-ACNC: 1.42 UIU/ML (ref 0.3–4.2)
WBC # BLD AUTO: 8.5 10E3/UL (ref 4–11)

## 2024-03-06 PROCEDURE — 85025 COMPLETE CBC W/AUTO DIFF WBC: CPT | Mod: ZL

## 2024-03-06 PROCEDURE — 36415 COLL VENOUS BLD VENIPUNCTURE: CPT | Mod: ZL

## 2024-03-06 PROCEDURE — 82306 VITAMIN D 25 HYDROXY: CPT | Mod: ZL

## 2024-03-06 PROCEDURE — 84443 ASSAY THYROID STIM HORMONE: CPT | Mod: ZL

## 2024-03-07 LAB — VIT D+METAB SERPL-MCNC: 36 NG/ML (ref 20–50)

## 2024-03-08 RX ORDER — AMLODIPINE BESYLATE 5 MG/1
5 TABLET ORAL DAILY
Qty: 90 TABLET | Refills: 3 | OUTPATIENT
Start: 2024-03-08

## 2024-03-08 NOTE — TELEPHONE ENCOUNTER
Denying refill request, medication discontinued on 2/05/2024    Last Prescription Date: 12/22/2022  Last Qty/Refills: 90 / R-3  Last Office Visit: 2/05/2024  Future Office Visit: None     Requested Prescriptions   Pending Prescriptions Disp Refills    amLODIPine (NORVASC) 5 MG tablet [Pharmacy Med Name: AMLODIPINE 5MG TABLET] 90 tablet 3     Sig: TAKE 1 TABLET BY MOUTH DAILY       Calcium Channel Blockers Protocol  Failed - 3/8/2024  2:24 PM        Failed - Blood pressure under 140/90 in past 12 months     BP Readings from Last 3 Encounters:   02/05/24 (!) 140/92   06/06/23 128/72   12/22/22 134/86                 Failed - Medication is active on med list        Failed - Normal serum creatinine on file in past 12 months     Recent Labs   Lab Test 12/28/22  1401   CR 0.65       Ok to refill medication if creatinine is low         Luzmaria Long, RN on 3/8/2024 at 2:26 PM

## 2024-03-15 DIAGNOSIS — I10 ESSENTIAL HYPERTENSION: ICD-10-CM

## 2024-03-20 RX ORDER — AMLODIPINE BESYLATE 5 MG/1
5 TABLET ORAL DAILY
Qty: 90 TABLET | Refills: 3 | OUTPATIENT
Start: 2024-03-20

## 2024-03-20 NOTE — TELEPHONE ENCOUNTER
[facility] sent Rx request for the following:      Requested Prescriptions   Pending Prescriptions Disp Refills    amLODIPine (NORVASC) 5 MG tablet [Pharmacy Med Name: AMLODIPINE 5MG TABLET] 90 tablet 3     Sig: TAKE 1 TABLET BY MOUTH DAILY       Calcium Channel Blockers Protocol  Failed - 3/20/2024 10:11 AM        Failed - Blood pressure under 140/90 in past 12 months     BP Readings from Last 3 Encounters:   02/05/24 (!) 140/92   06/06/23 128/72   12/22/22 134/86                 Failed - Medication is active on med list        Failed - Normal serum creatinine on file in past 12 months     Recent Labs   Lab Test 12/28/22  1401   CR 0.65                Last Prescription Date:   not on current med list  Last Fill Qty/Refills:         , R-    Last Office Visit:              2/5/24   Future Office visit:           none    Amlodipine not on current med list.  Called patient and patient states she is not taking Amlodipine anymore.  Request denied   Rehana Hall RN on 3/20/2024 at 10:22 AM

## 2024-04-03 PROBLEM — Z00.00 HEALTHCARE MAINTENANCE: Status: ACTIVE | Noted: 2024-04-03

## 2024-04-03 PROBLEM — T88.4XXA HARD TO INTUBATE: Status: ACTIVE | Noted: 2022-04-28

## 2024-04-03 RX ORDER — ONDANSETRON 2 MG/ML
4 INJECTION INTRAMUSCULAR; INTRAVENOUS
Status: CANCELLED | OUTPATIENT
Start: 2024-04-03

## 2024-04-03 RX ORDER — SODIUM CHLORIDE, SODIUM LACTATE, POTASSIUM CHLORIDE, CALCIUM CHLORIDE 600; 310; 30; 20 MG/100ML; MG/100ML; MG/100ML; MG/100ML
INJECTION, SOLUTION INTRAVENOUS CONTINUOUS
Status: CANCELLED | OUTPATIENT
Start: 2024-04-03

## 2024-04-03 RX ORDER — LIDOCAINE 40 MG/G
CREAM TOPICAL
Status: CANCELLED | OUTPATIENT
Start: 2024-04-03

## 2024-07-29 ENCOUNTER — TELEPHONE (OUTPATIENT)
Dept: FAMILY MEDICINE | Facility: OTHER | Age: 59
End: 2024-07-29
Payer: COMMERCIAL

## 2024-07-29 DIAGNOSIS — F41.9 ANXIETY: ICD-10-CM

## 2024-07-29 DIAGNOSIS — F34.1 DYSTHYMIC DISORDER: Primary | ICD-10-CM

## 2024-07-29 NOTE — TELEPHONE ENCOUNTER
The patient is wondering if she could get the caplets instead of capsules because of reflux.  She states antacids don't help.  Please advise.

## 2024-07-29 NOTE — TELEPHONE ENCOUNTER
Left message to call back. It appears her insurance does not cover tablets, only cover capsules. We can send a script for tablets but will probably need a PA. Also, Fluoxetine 40 mg does not come in tablets- only capsules (so it would have to be written for 3 tablets daily which insurance most likely will not cover).     Mita Pabon, DANIELITO on 7/29/2024 at 12:44 PM  Ext [phone]

## 2024-07-31 RX ORDER — FLUOXETINE HYDROCHLORIDE 60 MG/1
60 TABLET, FILM COATED ORAL; ORAL DAILY
Qty: 90 TABLET | Refills: 4 | Status: CANCELLED | OUTPATIENT
Start: 2024-07-31

## 2024-07-31 NOTE — TELEPHONE ENCOUNTER
Patient states Fluoxetine capsules give her heartburn and acid reflux. She spoke to the pharmacist and they informed her there is a 60 mg tablet. Patient would like to try that. She was informed that it will require a PA and it may not be covered by insurance. Patient stated she would pay out-of-pocket for it if insurance does not cover it. RX pending.     Mita Pabon CMA on 7/31/2024 at 8:46 AM

## 2024-08-01 RX ORDER — FLUOXETINE HYDROCHLORIDE 60 MG/1
60 TABLET, FILM COATED ORAL; ORAL DAILY
Qty: 90 TABLET | Refills: 3 | Status: SHIPPED | OUTPATIENT
Start: 2024-08-01

## 2024-08-02 NOTE — TELEPHONE ENCOUNTER
Called and verified patient full name and . Notified patient of below.     Mita Jones LPN on 2024 at 8:06 AM

## 2024-08-07 NOTE — TELEPHONE ENCOUNTER
PA for Fluoxetine 60 mg tablets was denied. Fax sent to be scanned into chart. Patient contacted and informed. She stated a 90-day supply cost her around $50 so she will pay out-of-pocket for it.     Mita Pabon CMA on 8/7/2024 at 10:25 AM

## 2024-08-22 DIAGNOSIS — F41.9 ANXIETY: ICD-10-CM

## 2024-08-23 NOTE — TELEPHONE ENCOUNTER
[id number] sent Rx request for the following:      Requested Prescriptions   Pending Prescriptions Disp Refills    LORazepam (ATIVAN) 0.5 MG tablet [Pharmacy Med Name: LORAZEPAM 0.5MG TABLET] 30 tablet 1     Sig: TAKE 1 TABLET (0.5 MG) BY MOUTH EVERY 6 HOURS       There is no refill protocol information for this order          Last Prescription Date:   2/5/24  Last Fill Qty/Refills:         30, R-1    Last Office Visit:              2/5/24   Future Office visit:           None    Unable to complete prescription refill per RN Medication Refill Policy.     Erik Mckeon RN on 8/23/2024 at 3:19 PM

## 2024-08-26 RX ORDER — LORAZEPAM 0.5 MG/1
0.5 TABLET ORAL EVERY 6 HOURS
Qty: 30 TABLET | Refills: 1 | Status: SHIPPED | OUTPATIENT
Start: 2024-08-26

## 2024-12-19 ENCOUNTER — PATIENT OUTREACH (OUTPATIENT)
Dept: CARE COORDINATION | Facility: CLINIC | Age: 59
End: 2024-12-19
Payer: COMMERCIAL

## 2025-02-25 DIAGNOSIS — R42 DIZZINESS: ICD-10-CM

## 2025-02-25 DIAGNOSIS — F41.9 ANXIETY: ICD-10-CM

## 2025-02-27 RX ORDER — MECLIZINE HYDROCHLORIDE 25 MG/1
25 TABLET ORAL 3 TIMES DAILY PRN
Qty: 90 TABLET | Refills: 0 | Status: SHIPPED | OUTPATIENT
Start: 2025-02-27

## 2025-02-27 RX ORDER — LORAZEPAM 0.5 MG/1
0.5 TABLET ORAL EVERY 6 HOURS
Qty: 30 TABLET | Refills: 0 | Status: SHIPPED | OUTPATIENT
Start: 2025-02-27

## 2025-03-06 DIAGNOSIS — R20.2 PARESTHESIAS IN LEFT HAND: ICD-10-CM

## 2025-03-06 RX ORDER — GABAPENTIN 100 MG/1
100 CAPSULE ORAL 3 TIMES DAILY
Qty: 90 CAPSULE | Refills: 0 | Status: SHIPPED | OUTPATIENT
Start: 2025-03-06

## 2025-03-06 NOTE — TELEPHONE ENCOUNTER
[id number] sent Rx request for the following:      Requested Prescriptions   Pending Prescriptions Disp Refills    gabapentin (NEURONTIN) 100 MG capsule [Pharmacy Med Name: GABAPENTIN 100MG CAPSULE] 270 capsule 4     Sig: TAKE 1 CAPSULE (100 MG) BY MOUTH 3 TIMES DAILY       There is no refill protocol information for this order          Last Prescription Date:   2/5/24  Last Fill Qty/Refills:         270, R-4    Last Office Visit:              2/5/24   Future Office visit:             Next 5 appointments (look out 90 days)      Mar 27, 2025 11:00 AM  (Arrive by 10:45 AM)  Provider Visit with Jaycee Ochoa MD  [facility] ([facility]) [address]  [place] [id number] [phone]         Unable to complete prescription refill per RN Medication Refill Policy.     Erik Mckeon RN on 3/6/2025 at 3:08 PM

## 2025-03-10 NOTE — TELEPHONE ENCOUNTER
3/27/25 appointment scheduled with Dr. Aristeo Pabon CMA on 3/10/2025 at 9:34 AM    
PDMP Review         Value Time User    State PDMP site checked  Yes 2/27/2025  4:28 PM Jaycee Ochoa MD           Medications refilled.  She is due for a physical.  Please have her make an appointment.  Jaycee Ochoa MD   
[id number] sent Rx request for the following:      Requested Prescriptions   Pending Prescriptions Disp Refills    meclizine (ANTIVERT) 25 MG tablet [Pharmacy Med Name: MECLIZINE 25MG TABLET] 30 tablet 11     Sig: TAKE 1 TABLET (25 MG) BY MOUTH 3 TIMES DAILY AS NEEDED FOR DIZZINESS   Last Prescription Date:   2/5/24  Last Fill Qty/Refills:         30, R-11        LORazepam (ATIVAN) 0.5 MG tablet [Pharmacy Med Name: LORAZEPAM 0.5MG TABLET] 30 tablet 1     Sig: TAKE 1 TABLET (0.5 MG) BY MOUTH EVERY 6 HOURS   Last Prescription Date:   8/26/24  Last Fill Qty/Refills:         30, R-1      There is no refill protocol information for this order        Last Office Visit:              2/5/24   Future Office visit:           3/27/25    Unable to complete prescription refill per RN Medication Refill Policy. Anna Lambert RN .............. 2/27/2025  1:29 PM    
Reason for call: Medication or medication refill    Have you contacted your pharmacy regarding this refill? Yes      If No, direct the patient to contact the Pharmacy and discontinue telephone note using Erroneous Encounter.  If Yes, continue.    Name of medication requested: lorazepam and meclizine     How many days of medication do you have left? None     What pharmacy do you use ?  [facility]     Preferred method for responding to this message: Telephone Call    Phone number patient can be reached at: Cell number on file:    Telephone Information:   Mobile [phone]       If we cannot reach you directly, may we leave a detailed response at the number you provided? Yes   
None

## 2025-03-27 ENCOUNTER — OFFICE VISIT (OUTPATIENT)
Dept: FAMILY MEDICINE | Facility: OTHER | Age: 60
End: 2025-03-27
Attending: FAMILY MEDICINE
Payer: COMMERCIAL

## 2025-03-27 VITALS
DIASTOLIC BLOOD PRESSURE: 84 MMHG | BODY MASS INDEX: 26.47 KG/M2 | SYSTOLIC BLOOD PRESSURE: 126 MMHG | RESPIRATION RATE: 18 BRPM | HEART RATE: 84 BPM | OXYGEN SATURATION: 96 % | TEMPERATURE: 98.1 F | WEIGHT: 149.4 LBS

## 2025-03-27 DIAGNOSIS — F41.9 ANXIETY: ICD-10-CM

## 2025-03-27 DIAGNOSIS — R11.0 NAUSEA: ICD-10-CM

## 2025-03-27 DIAGNOSIS — F33.1 MODERATE RECURRENT MAJOR DEPRESSION (H): Primary | ICD-10-CM

## 2025-03-27 DIAGNOSIS — F51.02 ADJUSTMENT INSOMNIA: ICD-10-CM

## 2025-03-27 RX ORDER — ARIPIPRAZOLE 2 MG/1
2 TABLET ORAL DAILY
Qty: 90 TABLET | Refills: 3 | Status: SHIPPED | OUTPATIENT
Start: 2025-03-27

## 2025-03-27 RX ORDER — ONDANSETRON 4 MG/1
4 TABLET, ORALLY DISINTEGRATING ORAL EVERY 8 HOURS PRN
Qty: 20 TABLET | Refills: 3 | Status: SHIPPED | OUTPATIENT
Start: 2025-03-27

## 2025-03-27 RX ORDER — ZOLPIDEM TARTRATE 5 MG/1
5 TABLET ORAL
Qty: 30 TABLET | Refills: 5 | Status: SHIPPED | OUTPATIENT
Start: 2025-03-27

## 2025-03-27 ASSESSMENT — ANXIETY QUESTIONNAIRES
5. BEING SO RESTLESS THAT IT IS HARD TO SIT STILL: SEVERAL DAYS
3. WORRYING TOO MUCH ABOUT DIFFERENT THINGS: MORE THAN HALF THE DAYS
7. FEELING AFRAID AS IF SOMETHING AWFUL MIGHT HAPPEN: NEARLY EVERY DAY
2. NOT BEING ABLE TO STOP OR CONTROL WORRYING: MORE THAN HALF THE DAYS
7. FEELING AFRAID AS IF SOMETHING AWFUL MIGHT HAPPEN: NEARLY EVERY DAY
8. IF YOU CHECKED OFF ANY PROBLEMS, HOW DIFFICULT HAVE THESE MADE IT FOR YOU TO DO YOUR WORK, TAKE CARE OF THINGS AT HOME, OR GET ALONG WITH OTHER PEOPLE?: SOMEWHAT DIFFICULT
GAD7 TOTAL SCORE: 14
6. BECOMING EASILY ANNOYED OR IRRITABLE: MORE THAN HALF THE DAYS
GAD7 TOTAL SCORE: 14
GAD7 TOTAL SCORE: 14
4. TROUBLE RELAXING: MORE THAN HALF THE DAYS
1. FEELING NERVOUS, ANXIOUS, OR ON EDGE: MORE THAN HALF THE DAYS
IF YOU CHECKED OFF ANY PROBLEMS ON THIS QUESTIONNAIRE, HOW DIFFICULT HAVE THESE PROBLEMS MADE IT FOR YOU TO DO YOUR WORK, TAKE CARE OF THINGS AT HOME, OR GET ALONG WITH OTHER PEOPLE: SOMEWHAT DIFFICULT

## 2025-03-27 ASSESSMENT — ASTHMA QUESTIONNAIRES
QUESTION_3 LAST FOUR WEEKS HOW OFTEN DID YOUR ASTHMA SYMPTOMS (WHEEZING, COUGHING, SHORTNESS OF BREATH, CHEST TIGHTNESS OR PAIN) WAKE YOU UP AT NIGHT OR EARLIER THAN USUAL IN THE MORNING: NOT AT ALL
QUESTION_4 LAST FOUR WEEKS HOW OFTEN HAVE YOU USED YOUR RESCUE INHALER OR NEBULIZER MEDICATION (SUCH AS ALBUTEROL): ONE OR TWO TIMES PER DAY
QUESTION_2 LAST FOUR WEEKS HOW OFTEN HAVE YOU HAD SHORTNESS OF BREATH: ONCE A DAY
QUESTION_5 LAST FOUR WEEKS HOW WOULD YOU RATE YOUR ASTHMA CONTROL: SOMEWHAT CONTROLLED
ACT_TOTALSCORE: 16
QUESTION_1 LAST FOUR WEEKS HOW MUCH OF THE TIME DID YOUR ASTHMA KEEP YOU FROM GETTING AS MUCH DONE AT WORK, SCHOOL OR AT HOME: A LITTLE OF THE TIME

## 2025-03-27 ASSESSMENT — PATIENT HEALTH QUESTIONNAIRE - PHQ9
10. IF YOU CHECKED OFF ANY PROBLEMS, HOW DIFFICULT HAVE THESE PROBLEMS MADE IT FOR YOU TO DO YOUR WORK, TAKE CARE OF THINGS AT HOME, OR GET ALONG WITH OTHER PEOPLE: SOMEWHAT DIFFICULT
SUM OF ALL RESPONSES TO PHQ QUESTIONS 1-9: 15
SUM OF ALL RESPONSES TO PHQ QUESTIONS 1-9: 15

## 2025-03-27 ASSESSMENT — ENCOUNTER SYMPTOMS: NERVOUS/ANXIOUS: 1

## 2025-03-27 ASSESSMENT — PAIN SCALES - GENERAL: PAINLEVEL_OUTOF10: NO PAIN (0)

## 2025-03-27 NOTE — PROGRESS NOTES
Nursing Notes:   Mita Jones LPN  3/27/2025 11:07 AM  Sign at exiting of workspace  Chief Complaint   Patient presents with    Depression    Anxiety         Medication Reconciliation: complete    Mita Jones LPN      Subjective   Joana is a 60 year old, presenting for the following health issues:  Depression and Anxiety        3/27/2025    11:06 AM   Additional Questions   Roomed by Mita Jones     Via the Health Maintenance questionnaire, the patient has reported the following services have been completed -Mammogram: [facility] 2024, this information has been sent to the abstraction team.    Joana is here today for a complaint of anxiety and depression.  She has been , but  for many years.  She had a fiance she was planning to eventually .  He just  of a massive heart attack in December.  His mom  12/10/24.  They had been helping to care for her.  Now that he has , his brother is trying to get the house she is still living in.  There is a lot of stress with his brother and sister-in-law.  She is under a lot of stress.  Having a lot of panic attacks.  Sometimes has to take 3 lorazepam just to relax.  She falls asleep and can wake up in the middle of the night abruptly and cannot sleep.  He did not have a will made.  His estate will need to go through probate.  There is a court date on 25.  She is already on fluoxetine 60 mg daily and wonders if there is a medication she could add to this to help with her depression symptoms.  She is allergic to Wellbutrin.        2023     2:50 PM 2024     3:12 PM 3/27/2025    10:46 AM   PHQ   PHQ-9 Total Score 10 4 15    Q9: Thoughts of better off dead/self-harm past 2 weeks Not at all  Not at all Not at all       Patient-reported    Proxy-reported         9/10/2021     2:24 PM 2023     2:52 PM 3/27/2025    10:46 AM   MAUDE-7 SCORE   Total Score 10 (moderate anxiety) 6 (mild anxiety) 14 (moderate anxiety)   Total  Score 10 6 14        Patient-reported           History of Present Illness       Reason for visit:  Talk to the doctor about meds She is missing 1 dose(s) of medications per week.  She is not taking prescribed medications regularly due to remembering to take.              6/6/2023     2:50 PM 2/5/2024     3:12 PM 3/27/2025    10:46 AM   PHQ   PHQ-9 Total Score 10 4 15    Q9: Thoughts of better off dead/self-harm past 2 weeks Not at all  Not at all Not at all       Patient-reported    Proxy-reported         9/10/2021     2:24 PM 6/6/2023     2:52 PM 3/27/2025    10:46 AM   MAUDE-7 SCORE   Total Score 10 (moderate anxiety) 6 (mild anxiety) 14 (moderate anxiety)   Total Score 10 6 14        Patient-reported           Review of Systems  Constitutional, HEENT, cardiovascular, pulmonary, GI, , musculoskeletal, neuro, skin, endocrine and psych systems are negative, except as otherwise noted.      Objective    /84   Pulse 84   Temp 98.1  F (36.7  C) (Tympanic)   Resp 18   Wt 67.8 kg (149 lb 6.4 oz)   LMP  (LMP Unknown)   SpO2 96%   Breastfeeding No   BMI 26.47 kg/m    Body mass index is 26.47 kg/m .  Physical Exam  Constitutional:       Appearance: Normal appearance.   HENT:      Head: Normocephalic.   Eyes:      Extraocular Movements: Extraocular movements intact.      Pupils: Pupils are equal, round, and reactive to light.   Cardiovascular:      Rate and Rhythm: Normal rate and regular rhythm.      Heart sounds: Normal heart sounds. No murmur heard.  Pulmonary:      Effort: Pulmonary effort is normal.      Breath sounds: Normal breath sounds. No wheezing, rhonchi or rales.   Musculoskeletal:      Cervical back: Normal range of motion and neck supple.   Lymphadenopathy:      Cervical: No cervical adenopathy.   Neurological:      Mental Status: She is alert.   Psychiatric:         Behavior: Behavior normal.      Comments: Tearful; depressed mood                  ICD-10-CM    1. Moderate recurrent major  depression (H)  F33.1 ARIPiprazole (ABILIFY) 2 MG tablet      2. Anxiety  F41.9 ARIPiprazole (ABILIFY) 2 MG tablet      3. Adjustment insomnia  F51.02 zolpidem (AMBIEN) 5 MG tablet      4. Nausea  R11.0 ondansetron (ZOFRAN ODT) 4 MG ODT tab           Will add low dose abilify 2 mg daily to the fluoxetine she is already taking.  Follow up in 1 month.  Declines offer to refer to counseling.  See #1.  Cautioned her not to overuse the ativan as this can be deadly if mixed with alcohol or other medications or if too much is taken.  Prescription for Ambien sent to pharmacy to use for sleep.  Prescription for zofran sent to pharmacy.    Return in about 1 month (around 4/27/2025) for Follow up visit - anxiety/depression, needs 40 minute appt.     47 minutes spent in review of chart, interviewing patient, examining patient, discussing plan, reviewing results and completing note on the date of encounter.      Jaycee Ohcoa MD

## 2025-03-27 NOTE — NURSING NOTE
Chief Complaint   Patient presents with    Depression    Anxiety         Medication Reconciliation: complete    Mita Jones, LPN

## 2025-03-27 NOTE — LETTER
My Asthma Action Plan    Name: Joana Caicedo   YOB: 1965  Date: 3/27/2025   My doctor: Jaycee Ochoa MD   My clinic: [facility]        My Rescue Medicine: Albuterol (Proair/Ventolin/Proventil HFA) 2-4 puffs EVERY 4 HOURS as needed. Use a spacer if recommended by your provider.   My Asthma Severity:   Intermittent / Exercise Induced  Know your asthma triggers: Patient is unaware of triggers             GREEN ZONE   Good Control  I feel good  No cough or wheeze  Can work, sleep and play without asthma symptoms       Take your asthma control medicine every day.     If exercise triggers your asthma, take your rescue medication  15 minutes before exercise or sports, and  During exercise if you have asthma symptoms  Spacer to use with inhaler: If you have a spacer, make sure to use it with your inhaler             YELLOW ZONE Getting Worse  I have ANY of these:  I do not feel good  Cough or wheeze  Chest feels tight  Wake up at night   Keep taking your Green Zone medications  Start taking your rescue medicine:  every 20 minutes for up to 1 hour. Then every 4 hours for 24-48 hours.  If you stay in the Yellow Zone for more than 12-24 hours, contact your doctor.  If you do not return to the Green Zone in 12-24 hours or you get worse, start taking your oral steroid medicine if prescribed by your provider.           RED ZONE Medical Alert - Get Help  I have ANY of these:  I feel awful  Medicine is not helping  Breathing getting harder  Trouble walking or talking  Nose opens wide to breathe       Take your rescue medicine NOW  If your provider has prescribed an oral steroid medicine, start taking it NOW  Call your doctor NOW  If you are still in the Red Zone after 20 minutes and you have not reached your doctor:  Take your rescue medicine again and  Call 911 or go to the emergency room right away    See your regular doctor within 2 weeks of an Emergency Room or Urgent Care  visit for follow-up treatment.          Annual Reminders:  Meet with Asthma Educator,  Flu Shot in the Fall, consider Pneumonia Vaccination for patients with asthma (aged 19 and older).    Pharmacy: [id number] - [place], [place] - [address]    Electronically signed by Jaycee Ochoa MD   Date: 03/27/25                    Asthma Triggers  How To Control Things That Make Your Asthma Worse    Triggers are things that make your asthma worse.  Look at the list below to help you find your triggers and   what you can do about them. You can help prevent asthma flare-ups by staying away from your triggers.      Trigger                                                          What you can do   Cigarette Smoke  Tobacco smoke can make asthma worse. Do not allow smoking in your home, car or around you.  Be sure no one smokes at a child s day care or school.  If you smoke, ask your health care provider for ways to help you quit.  Ask family members to quit too.  Ask your health care provider for a referral to Quit Plan to help you quit smoking, or call 0-125-847-PLAN.     Colds, Flu, Bronchitis  These are common triggers of asthma. Wash your hands often.  Don t touch your eyes, nose or mouth.  Get a flu shot every year.     Dust Mites  These are tiny bugs that live in cloth or carpet. They are too small to see. Wash sheets and blankets in hot water every week.   Encase pillows and mattress in dust mite proof covers.  Avoid having carpet if you can. If you have carpet, vacuum weekly.   Use a dust mask and HEPA vacuum.   Pollen and Outdoor Mold  Some people are allergic to trees, grass, or weed pollen, or molds. Try to keep your windows closed.  Limit time out doors when pollen count is high.   Ask you health care provider about taking medicine during allergy season.     Animal Dander  Some people are allergic to skin flakes, urine or saliva from pets with fur or feathers. Keep pets with fur or  feathers out of your home.    If you can t keep the pet outdoors, then keep the pet out of your bedroom.  Keep the bedroom door closed.  Keep pets off cloth furniture and away from stuffed toys.     Mice, Rats, and Cockroaches  Some people are allergic to the waste from these pests.   Cover food and garbage.  Clean up spills and food crumbs.  Store grease in the refrigerator.   Keep food out of the bedroom.   Indoor Mold  This can be a trigger if your home has high moisture. Fix leaking faucets, pipes, or other sources of water.   Clean moldy surfaces.  Dehumidify basement if it is damp and smelly.   Smoke, Strong Odors, and Sprays  These can reduce air quality. Stay away from strong odors and sprays, such as perfume, powder, hair spray, paints, smoke incense, paint, cleaning products, candles and new carpet.   Exercise or Sports  Some people with asthma have this trigger. Be active!  Ask your doctor about taking medicine before sports or exercise to prevent symptoms.    Warm up for 5-10 minutes before and after sports or exercise.     Other Triggers of Asthma  Cold air:  Cover your nose and mouth with a scarf.  Sometimes laughing or crying can be a trigger.  Some medicines and food can trigger asthma.

## 2025-04-22 DIAGNOSIS — J45.20 MILD INTERMITTENT ASTHMA WITHOUT COMPLICATION: ICD-10-CM

## 2025-04-22 DIAGNOSIS — Z86.19 H/O COLD SORES: ICD-10-CM

## 2025-04-23 RX ORDER — ALBUTEROL SULFATE 90 UG/1
AEROSOL, METERED RESPIRATORY (INHALATION)
Qty: 18 G | Refills: 0 | Status: SHIPPED | OUTPATIENT
Start: 2025-04-23

## 2025-04-23 RX ORDER — VALACYCLOVIR HYDROCHLORIDE 1 G/1
TABLET, FILM COATED ORAL
Qty: 30 TABLET | Refills: 0 | Status: SHIPPED | OUTPATIENT
Start: 2025-04-23

## 2025-04-23 NOTE — TELEPHONE ENCOUNTER
[id number] sent Rx request for the following:      Requested Prescriptions   Pending Prescriptions Disp Refills    valACYclovir (VALTREX) 1000 mg tablet [Pharmacy Med Name: VALACYCLOVIR 1G TABLET] 30 tablet 8     Sig: TAKE 2 TABLETS BY MOUTH 2 TIMES DAILY FOR ONE DAY, THEN TAKE 1 TABLET TWICE DAILY FOR 3 DAYS. MAY REPEAT FOR FUTURE OUTBREAKS   Last Prescription Date:   2/5/24  Last Fill Qty/Refills:         30, R-8      Antivirals Failed - 4/23/2025  3:55 PM        Failed - Medication is active on med list and the sig matches. RN to manually verify dose and sig if red X/fail.     If the protocol passes (green check), you do not need to verify med dose and sig.    A prescription matches if they are the same clinical intention.    For Example: once daily and every morning are the same.    The protocol can not identify upper and lower case letters as matching and will fail.     For Example: Take 1 tablet (50 mg) by mouth daily     TAKE 1 TABLET (50 MG) BY MOUTH DAILY    For all fails (red x), verify dose and sig.    If the refill does match what is on file, the RN can still proceed to approve the refill request.       If they do not match, route to the appropriate provider.       VENTOLIN  (90 Base) MCG/ACT inhaler [Pharmacy Med Name: VENTOLIN HFA 90MCG/ACT INHALER] 18 g 11     Sig: INHALE 2 PUFFS INTO THE LUNGS EVERY 4 HOURS AS NEEDED FOR SHORTNESS OF BREATH   Last Prescription Date:   2/5/24  Last Fill Qty/Refills:         18 g, R-11      Short-Acting Beta Agonist Inhalers Protocol  Failed - 4/23/2025  3:55 PM        Failed - Asthma control assessment score within normal limits in last 6 months     Please review ACT score.      Last Office Visit:              3/27/25   Future Office visit:           5/1/25    Per LOV note:  Return in about 1 month (around 4/27/2025) for Follow up visit - anxiety/depression, needs 40 minute appt.     Unable to complete prescription refill per RN  Medication Refill Policy. Anna Lambert, Refill RN .............. 4/23/2025  3:57 PM

## 2025-04-25 DIAGNOSIS — R20.2 PARESTHESIAS IN LEFT HAND: ICD-10-CM

## 2025-04-28 RX ORDER — GABAPENTIN 100 MG/1
100 CAPSULE ORAL 3 TIMES DAILY
Qty: 90 CAPSULE | Refills: 0 | Status: SHIPPED | OUTPATIENT
Start: 2025-04-28

## 2025-04-28 NOTE — TELEPHONE ENCOUNTER
[id number] sent Rx request for the following:      Requested Prescriptions   Pending Prescriptions Disp Refills    gabapentin (NEURONTIN) 100 MG capsule [Pharmacy Med Name: GABAPENTIN 100MG CAPSULE] 90 capsule 0     Sig: TAKE 1 CAPSULE (100 MG) BY MOUTH 3 TIMES DAILY       There is no refill protocol information for this order        Paresthesias in left hand [R20.2]        Last Prescription Date:   3/6/25  Last Fill Qty/Refills:         90, R-0    Last Office Visit:              2/5/24 (dx discussed)   Future Office visit:             Next 5 appointments (look out 90 days)      May 01, 2025 11:40 AM  (Arrive by 11:25 AM)  Provider Visit with Jaycee Ochoa MD  [facility] ([facility]) [address]  [place] [id number] [phone]           Unable to complete prescription refill per RN Medication Refill Policy.     Erik Mckeon RN on 4/28/2025 at 2:05 PM

## 2025-06-18 DIAGNOSIS — F41.9 ANXIETY: ICD-10-CM

## 2025-06-18 DIAGNOSIS — R42 DIZZINESS: ICD-10-CM

## 2025-06-19 RX ORDER — LORAZEPAM 0.5 MG/1
0.5 TABLET ORAL EVERY 6 HOURS
Qty: 30 TABLET | Refills: 0 | OUTPATIENT
Start: 2025-06-19

## 2025-06-19 RX ORDER — MECLIZINE HYDROCHLORIDE 25 MG/1
25 TABLET ORAL 3 TIMES DAILY PRN
Qty: 90 TABLET | Refills: 0 | OUTPATIENT
Start: 2025-06-19

## 2025-06-19 NOTE — TELEPHONE ENCOUNTER
[id number] sent Rx request for the following:      Requested Prescriptions   Pending Prescriptions Disp Refills    LORazepam (ATIVAN) 0.5 MG tablet [Pharmacy Med Name: LORAZEPAM 0.5MG TABLET] 30 tablet 0     Sig: TAKE 1 TABLET (0.5 MG) BY MOUTH EVERY 6 HOURS       Rx Protocol Controlled Substance Failed - 6/19/2025 10:20 AM        Failed - Urine drug screeen results on file in past 12 months     [unfilled]           Failed - Controlled Substance Agreement on file in last 12 months     Please review last Controlled Substance Pain agreement document.   CSA -- Encounter Level:    CSA: None found at the encounter level.       CSA -- Patient Level:    CSA: None found at the patient level.               Failed - Auto Fail - Please forward to Provider   Anxiety [F41.9]     Last Prescription Date:   2/27/25  Last Fill Qty/Refills:         30, R-0        meclizine (ANTIVERT) 25 MG tablet [Pharmacy Med Name: MECLIZINE 25MG TABLET] 90 tablet 0     Sig: TAKE 1 TABLET (25 MG) BY MOUTH 3 TIMES DAILY AS NEEDED FOR DIZZINESS   Dizziness [R42]     Last Prescription Date:   2/27/25  Last Fill Qty/Refills:         90, R-0      Last Office Visit:              3/27/25 (dx discussed)    Future Office visit:                Per LOV note:  Return in about 1 month (around 4/27/2025) for Follow up visit - anxiety/depression, needs 40 minute appt.     Unable to complete prescription refill per RN Medication Refill Policy.     Routing to covering provider for refill consideration, as PCP/provider is out of clinic >48 hours or Pt is completely out of medication and provider is out of the clinic today.      Erik Mckeon RN on 6/19/2025 at 10:22 AM

## 2025-06-24 RX ORDER — LORAZEPAM 0.5 MG/1
0.5 TABLET ORAL EVERY 6 HOURS
Qty: 30 TABLET | Refills: 5 | Status: SHIPPED | OUTPATIENT
Start: 2025-06-24

## 2025-06-24 RX ORDER — MECLIZINE HYDROCHLORIDE 25 MG/1
25 TABLET ORAL 3 TIMES DAILY PRN
Qty: 90 TABLET | Refills: 3 | Status: SHIPPED | OUTPATIENT
Start: 2025-06-24

## 2025-06-24 NOTE — TELEPHONE ENCOUNTER
PDMP Review         Value Time User    State PDMP site checked  Yes 6/24/2025  1:52 PM Jaycee Ochoa MD           Refills completed.  Jaycee Ochoa MD

## 2025-06-24 NOTE — TELEPHONE ENCOUNTER
Jareth Jensen MD to  Unit 3 Nurse (Selected Message)  TP      6/19/25 12:39 PM  Needs to be seen or wait for PCP for the ativan, is a controlled substance

## 2025-08-27 DIAGNOSIS — F41.9 ANXIETY: ICD-10-CM

## 2025-08-27 DIAGNOSIS — F34.1 DYSTHYMIC DISORDER: ICD-10-CM

## 2025-08-28 RX ORDER — FLUOXETINE HYDROCHLORIDE 60 MG/1
1 TABLET, FILM COATED ORAL; ORAL DAILY
Qty: 90 TABLET | Refills: 0 | Status: SHIPPED | OUTPATIENT
Start: 2025-08-28

## (undated) DEVICE — ESU HOLDER LAP INST DISP PURPLE LONG 330MM H-PRO-330

## (undated) DEVICE — COVER LIGHT HANDLE LT-F02

## (undated) DEVICE — GLOVE BIOGEL PI INDICATOR 8.0 LF 41680

## (undated) DEVICE — SUTURE 5-0 MAXON SMM5042

## (undated) DEVICE — SOL WATER 1500ML

## (undated) DEVICE — ENDO TROCAR SLEEVE KII 5X100MM CTR03

## (undated) DEVICE — GLOVE ESTEEM POWDER FREE SMT 8.0  2D72PT80

## (undated) DEVICE — CLIP APPLIER ENDO ROTATING 10MM MED/LG ER320

## (undated) DEVICE — ENDO TROCAR FIRST ENTRY KII FIOS Z-THRD 12X100MM CTF73

## (undated) DEVICE — ESU CORD MONOPOLAR 10'  E0510

## (undated) DEVICE — ESU GROUND PAD ADULT W/CORD E7507

## (undated) DEVICE — DRSG MEDIPORE 3 1/2X4" 3566

## (undated) DEVICE — DRSG MEDIPORE 2X2 3/4" 3562

## (undated) DEVICE — DECANTER BAG 10-102

## (undated) DEVICE — SUCTION STRYKERFLOW II 250-070-500

## (undated) DEVICE — ENDO TROCAR SLEEVE KII Z-THREADED 05X100MM CTS02

## (undated) DEVICE — ENDO SCOPE WARMER DUAL LAP TM500D

## (undated) DEVICE — PREP CHLORAPREP CLEAR 26ML APPLICATOR 260800

## (undated) DEVICE — SLEEVE COMPRESSION SCD KNEE MED 74022

## (undated) DEVICE — ENDO POUCH UNIV RETRIEVAL SYSTEM INZII 10MM CD001

## (undated) DEVICE — PACK LAPAROSCOPY LF SBA15LPFCA

## (undated) DEVICE — TUBING INSUFFLATOR W/FILTER OLYMPUS WA95005A

## (undated) DEVICE — SU VICRYL 3-0 CT-3 27" J327H

## (undated) DEVICE — SU VICRYL 0 UR-6 27" J603H

## (undated) RX ORDER — LIDOCAINE HYDROCHLORIDE 10 MG/ML
INJECTION, SOLUTION EPIDURAL; INFILTRATION; INTRACAUDAL; PERINEURAL
Status: DISPENSED
Start: 2018-06-28

## (undated) RX ORDER — ACETAMINOPHEN 325 MG/1
TABLET ORAL
Status: DISPENSED
Start: 2022-04-28

## (undated) RX ORDER — FENTANYL CITRATE 50 UG/ML
INJECTION, SOLUTION INTRAMUSCULAR; INTRAVENOUS
Status: DISPENSED
Start: 2022-04-28

## (undated) RX ORDER — CEFTRIAXONE SODIUM 1 G
VIAL (EA) INJECTION
Status: DISPENSED
Start: 2018-06-28

## (undated) RX ORDER — WATER 10 ML/10ML
INJECTION INTRAMUSCULAR; INTRAVENOUS; SUBCUTANEOUS
Status: DISPENSED
Start: 2018-06-28

## (undated) RX ORDER — LIDOCAINE HYDROCHLORIDE 10 MG/ML
INJECTION, SOLUTION EPIDURAL; INFILTRATION; INTRACAUDAL; PERINEURAL
Status: DISPENSED
Start: 2022-04-28

## (undated) RX ORDER — DEXAMETHASONE SODIUM PHOSPHATE 4 MG/ML
INJECTION, SOLUTION INTRA-ARTICULAR; INTRALESIONAL; INTRAMUSCULAR; INTRAVENOUS; SOFT TISSUE
Status: DISPENSED
Start: 2022-04-28

## (undated) RX ORDER — OXYCODONE HYDROCHLORIDE 5 MG/1
TABLET ORAL
Status: DISPENSED
Start: 2022-04-28

## (undated) RX ORDER — ONDANSETRON 2 MG/ML
INJECTION INTRAMUSCULAR; INTRAVENOUS
Status: DISPENSED
Start: 2022-04-28

## (undated) RX ORDER — LIDOCAINE HYDROCHLORIDE 20 MG/ML
INJECTION, SOLUTION EPIDURAL; INFILTRATION; INTRACAUDAL; PERINEURAL
Status: DISPENSED
Start: 2022-04-28

## (undated) RX ORDER — PROPOFOL 10 MG/ML
INJECTION, EMULSION INTRAVENOUS
Status: DISPENSED
Start: 2022-04-28

## (undated) RX ORDER — DIPHENHYDRAMINE HYDROCHLORIDE 50 MG/ML
INJECTION INTRAMUSCULAR; INTRAVENOUS
Status: DISPENSED
Start: 2022-04-28

## (undated) RX ORDER — GLYCOPYRROLATE 0.2 MG/ML
INJECTION, SOLUTION INTRAMUSCULAR; INTRAVENOUS
Status: DISPENSED
Start: 2022-04-28

## (undated) RX ORDER — NEOSTIGMINE METHYLSULFATE 0.5 MG/ML
INJECTION INTRAVENOUS
Status: DISPENSED
Start: 2022-04-28

## (undated) RX ORDER — KETOROLAC TROMETHAMINE 30 MG/ML
INJECTION, SOLUTION INTRAMUSCULAR; INTRAVENOUS
Status: DISPENSED
Start: 2022-04-28

## (undated) RX ORDER — HYDROMORPHONE HYDROCHLORIDE 1 MG/ML
INJECTION, SOLUTION INTRAMUSCULAR; INTRAVENOUS; SUBCUTANEOUS
Status: DISPENSED
Start: 2022-04-28

## (undated) RX ORDER — BUPIVACAINE HYDROCHLORIDE 2.5 MG/ML
INJECTION, SOLUTION INFILTRATION; PERINEURAL
Status: DISPENSED
Start: 2022-04-28

## (undated) RX ORDER — CEFAZOLIN SODIUM/WATER 2 G/20 ML
SYRINGE (ML) INTRAVENOUS
Status: DISPENSED
Start: 2022-04-28